# Patient Record
Sex: MALE | Race: WHITE | Employment: OTHER | URBAN - METROPOLITAN AREA
[De-identification: names, ages, dates, MRNs, and addresses within clinical notes are randomized per-mention and may not be internally consistent; named-entity substitution may affect disease eponyms.]

---

## 2022-09-21 ENCOUNTER — OFFICE VISIT (OUTPATIENT)
Dept: SURGERY | Age: 77
End: 2022-09-21
Payer: MEDICARE

## 2022-09-21 ENCOUNTER — PREP FOR PROCEDURE (OUTPATIENT)
Dept: SURGERY | Age: 77
End: 2022-09-21

## 2022-09-21 VITALS
HEART RATE: 81 BPM | OXYGEN SATURATION: 98 % | WEIGHT: 172.8 LBS | DIASTOLIC BLOOD PRESSURE: 80 MMHG | SYSTOLIC BLOOD PRESSURE: 130 MMHG

## 2022-09-21 DIAGNOSIS — C43.71 MALIGNANT MELANOMA OF RIGHT LOWER EXTREMITY INCLUDING HIP (HCC): Primary | ICD-10-CM

## 2022-09-21 DIAGNOSIS — C43.71 MELANOMA OF RIGHT POSTERIOR CALF (HCC): ICD-10-CM

## 2022-09-21 PROCEDURE — 1123F ACP DISCUSS/DSCN MKR DOCD: CPT | Performed by: SURGERY

## 2022-09-21 PROCEDURE — 99204 OFFICE O/P NEW MOD 45 MIN: CPT | Performed by: SURGERY

## 2022-09-21 NOTE — PROGRESS NOTES
nares normal  CV: RRR. Normal perfusion  Resp: No JVD. Breathing is  non-labored; no audible wheezing. GI: soft and non-distended       Healing shave biopsy site right posterior calf  No satellite nodules  No regional adenopathy. Musculoskeletal: unremarkable with normal function. No embolic signs or cyanosis. Neuro:  Oriented; moves all 4; no focal deficits  Psychiatric: normal affect and mood, no memory impairment    Recent vitals (if inpt):  @IPVITALS(24:)@    Amount and/or Complexity of Data Reviewed and Analyzed:  I reviewed and analyzed all of the unique labs and radiologic studies that are shown below as well as any that are in the HPI, and any that are in the expanded problem list below  *Each unique test, order, or document contributes to the combination of 2 or combination of 3 in Category 1 below. For this visit I also reviewed old records and prior notes. No results for input(s): WBC, HGB, PLT, NA, K, CL, CO2, BUN, CREA, GLU, INR, APTT, ALT, AML, AML, LCAD, PCO2, PO2, HCO3 in the last 72 hours. Invalid input(s): PTP, TBIL, TBILI, CBIL, SGOT, GPT, AP, LPSE, NH4, TROPT, TROIQ,  PH  Review of most recent CBC  No results found for: WBC, HGB, HCT, MCV, PLT    Review of most recent BMP  No results found for: NA, K, CL, CO2, BUN, CREATININE, GLUCOSE, CALCIUM     Review of most recent LFTs (and lipase if done)  No results found for: ALT, AST, GGT, ALKPHOS, BILITOT  No results found for: LIPASE    No results found for: INR, APTT, LCAD    Review of most recent HgbA1c  No results found for: LABA1C  No results found for: EAG    Nutritional assessment screen for wound healing issues:  No results found for: LABPROT, LABALBU    @lastcovr@    Xray Result (most recent):  No results found for this or any previous visit from the past 3650 days. CT Result (most recent):  No results found for this or any previous visit from the past 3650 days.     US Result (most recent):  No results found for this or any previous visit from the past 3650 days. Admission date (for inpatients): (Not on file)   * No surgery found *  * No surgery found *        ASSESSMENT/PLAN:  [unfilled]  Active Problems:    * No active hospital problems. *  Resolved Problems:    * No resolved hospital problems. *     Patient Active Problem List    Diagnosis Date Noted    Melanoma of right posterior calf ([facility] [address] 09/21/2022     Priority: Medium            Number and Complexity of Problems addressed and   Risks of complications and/or morbidity of management      pT3bN0 right anterior calf malignant melanoma  Informed consent was obtained for wide excision with split-thickness skin grafting and sentinel node excision  Procedure risk were discussed including bleeding, infection, recurrence, blood clots, risk of anesthesia, etc.. All his questions were answered. He is in favor proceeding. We will schedule this for him soon. I discussed the seriousness of his condition with he and his wife on 9/21/2022. We will obtain a preoperative chest x-ray and LFTs as a baseline. He will need to be off blood thinners for surgery. I have asked him to review his prescription bottle at home and call the physician's office who is prescribing the medication and ask her to be off the blood thinner prior to surgery for as many days as the prescriber will allow. We are still trying to is called just who prescribed the below. He does not name group practice or the name of the prescription at time of office visit. I tried showing him photographs on the Internet of the different cardiologist at the groups in [facility] but none of them could be identified by the patient as his cardiologist.  He will check the prescription bottle at home and call us back.                Level of MDM (2/3 elements below)  Number and Complexity of Problems Addressed Amount and/or Complexity of Data to be Reviewed and Analyzed  *Each unique test, order, or document contributes to the combination of 2 or combination of 3 in Category 1 below. Risk of Complications and/or Morbidity or Mortality of pt Management     39051  40044 SF Minimal  ?1self-limited or minor problem Minimal or none Minimal risk of morbidity from additional diagnostic testing or Rx   92089  75700 Low Low  ? 2or more self-limited or minor problems;    or  ? 1stable chronic illness;    or  ?1acute, uncomplicated illness or injury   Limited  (Must meet the requirements of at least 1 of the 2 categories)  Category 1: Tests and documents   ? Any combination of 2 from the following:  ?Review of prior external note(s) from each unique source*;  ?review of the result(s) of each unique test*;   ?ordering of each unique test*    or   Category 2: Assessment requiring an independent historian(s)  (For the categories of independent interpretation of tests and discussion of management or test interpretation, see moderate or high) Low risk of morbidity from additional diagnostic testing or treatment     88561  64176 Mod Moderate  ? 1or more chronic illnesses with exacerbation, progression, or side effects of treatment;    or  ?2or more stable chronic illnesses;    or  ?1undiagnosed new problem with uncertain prognosis;    or  ?1acute illness with systemic symptoms;    or  ?1acute complicated injury   Moderate  (Must meet the requirements of at least 1 out of 3 categories)  Category 1: Tests, documents, or independent historian(s)  ? Any combination of 3 from the following:   ?Review of prior external note(s) from each unique source*;  ?Review of the result(s) of each unique test*;  ?Ordering of each unique test*;  ?Assessment requiring an independent historian(s)    or  Category 2: Independent interpretation of tests   ? Independent interpretation of a test performed by another physician/other qualified health care professional (not separately reported);     or  Category 3: Discussion of management or test interpretation  ? Discussion of management or test interpretation with external physician/other qualified health care professional/appropriate source (not separately reported)   Moderate risk of morbidity from additional diagnostic testing or treatment  Examples only:  ?Prescription drug management   ? Decision regarding minor surgery with identified patient or procedure risk factors  ? Decision regarding elective major surgery without identified patient or procedure risk factors   ? Diagnosis or treatment significantly limited by social determinants of health       [id number] High High  ? 1or more chronic illnesses with severe exacerbation, progression, or side effects of treatment;    or  ?1 acute or chronic illness or injury that poses a threat to life or bodily function   Extensive  (Must meet the requirements of at least 2 out of 3 categories)  Category 1: Tests, documents, or independent historian(s)  ? Any combination of 3 from the following:   ?Review of prior external note(s) from each unique source*;  ?Review of the result(s) of each unique test*;   ?Ordering of each unique test*;   ?Assessment requiring an independent historian(s)    or   Category 2: Independent interpretation of tests   ? Independent interpretation of a test performed by another physician/other qualified health care professional (not separately reported);     or  Category 3: Discussion of management or test interpretation  ? Discussion of management or test interpretation with external physician/other qualified health care professional/appropriate source (not separately reported)   High risk of morbidity from additional diagnostic testing or treatment  Examples only:  ?Drug therapy requiring intensive monitoring for toxicity  ? Decision regarding elective major surgery with identified patient or procedure risk factors  ? Decision regarding emergency major surgery  ? Decision regarding hospitalization  ? Decision not to resuscitate or to de-escalate care because of poor prognosis I have personally performed a face-to-face diagnostic evaluation and management  service on this patient. I have independently seen the patient. I have independently obtained the above history from the patient/family. I have independently examined the patient with above findings. I have independently reviewed data/labs for this patient and developed the above plan of care (MDM).       Signed: Octavia Doe MD  9/21/2022    10:46 AM

## 2022-10-11 ENCOUNTER — HOSPITAL ENCOUNTER (OUTPATIENT)
Dept: PREADMISSION TESTING | Age: 77
Discharge: HOME | End: 2022-10-14
Payer: MEDICARE

## 2022-10-11 VITALS
TEMPERATURE: 98.2 F | HEART RATE: 72 BPM | BODY MASS INDEX: 26.19 KG/M2 | OXYGEN SATURATION: 100 % | HEIGHT: 69 IN | DIASTOLIC BLOOD PRESSURE: 76 MMHG | WEIGHT: 176.8 LBS | RESPIRATION RATE: 16 BRPM | SYSTOLIC BLOOD PRESSURE: 134 MMHG

## 2022-10-11 LAB
CREAT SERPL-MCNC: 0.76 MG/DL (ref 0.8–1.5)
HGB BLD-MCNC: 13.5 G/DL (ref 13.6–17.2)
POTASSIUM SERPL-SCNC: 3.8 MMOL/L (ref 3.5–5.1)

## 2022-10-11 PROCEDURE — 82565 ASSAY OF CREATININE: CPT

## 2022-10-11 PROCEDURE — 85018 HEMOGLOBIN: CPT

## 2022-10-11 PROCEDURE — 84132 ASSAY OF SERUM POTASSIUM: CPT

## 2022-10-11 RX ORDER — ASPIRIN 81 MG/1
81 TABLET ORAL DAILY
COMMUNITY
Start: 2022-10-17 | End: 2022-10-18

## 2022-10-11 RX ORDER — LOSARTAN POTASSIUM AND HYDROCHLOROTHIAZIDE 25; 100 MG/1; MG/1
1 TABLET ORAL DAILY
COMMUNITY

## 2022-10-11 RX ORDER — CETIRIZINE HYDROCHLORIDE 10 MG/1
10 TABLET ORAL DAILY
COMMUNITY

## 2022-10-11 RX ORDER — ATORVASTATIN CALCIUM 20 MG/1
20 TABLET, FILM COATED ORAL EVERY MORNING
COMMUNITY

## 2022-10-11 NOTE — PERIOP NOTE
PLEASE CONTINUE TAKING ALL PRESCRIPTION MEDICATIONS UP TO THE DAY OF SURGERY UNLESS OTHERWISE DIRECTED BELOW. Take ONLY the following medications on the day of surgery:   Aspirin 81 mg  Atorvastatin   Metoprolol             Hold the following medications as specified:   Eliquis- hold 48 hours (2 days prior to surgery)   DISCONTINUE all vitamins and supplements 7 days prior to surgery. DISCONTINUE Non-Steriodal Anti-Inflammatory (NSAIDS) such as Advil and Aleve 5 days prior to surgery. Comments       On the day before surgery please take Tylenol (Acetaminophen) 1000mg in the morning and then again before bed . Please do not bring home medications with you on the day of surgery unless otherwise directed by your nurse. If you are instructed to bring home medications, please give them to your nurse as they will be administered by the nursing staff. If you have any questions, please call [facility] [phone] or [facility] [phone]. A copy of this note was provided to the patient for reference.

## 2022-10-11 NOTE — PERIOP NOTE
Patient confirms name and . Order to obtain consent NOT found in EHR, however patient verifies case posting and matches surgeon's note 22. .    Type 2 surgery,  assessment complete. Labs per surgeon: unknown  Labs per anesthesia protocol: hgb, potassium, creatinine  EK22 viewable tracing in [facility]. Cardiology notes also available in EHR. Medication hold clearance available in [facility]. Glucose:not indicated    Medication list updated today. Medication bottles visualized. Hibiclens/Dynahex antiseptic wash and instructions given per hospital policy. Patient provided with and instructed on educational handouts including Guide to Surgery, Pain Management, Hand Hygiene, Blood Transfusion Education, and [facility] [facility] Brochure. Patient answered medical/surgical history questions at their best of ability. All prior to admission medications documented in [facility]. Patient instructed on the following:  Arrive at MAIN Entrance, time of arrival to be called the day before by 1700  NPO after midnight including gum, mints, and ice chips. Responsible adult must drive patient to the hospital, stay during surgery, and patient will require supervision 24 hours after anesthesia. Use hibiclens in shower the night before surgery and on the morning of surgery. Leave all valuables (money and jewelry) at home but bring insurance card and ID on DOS. Do not wear make-up, nail polish, lotions, cologne, perfumes, powders, or oil on skin. You may be required to pay a deductible or co-pay on the day of your procedure. You can pre-pay by calling [phone] if your surgery is at the [facility] or [phone] if your surgery is at the [facility]. You will received a call from the pre-op nurse by 5 pm on the business day prior to the scheduled procedure. If you have not spoken with a nurse, please check your voicemail.  If you have not received an arrival time by 5 pm, please call [phone]. Patient teach back successful and patient demonstrates knowledge of instruction.

## 2022-10-17 ENCOUNTER — ANESTHESIA EVENT (OUTPATIENT)
Dept: SURGERY | Age: 77
End: 2022-10-17
Payer: MEDICARE

## 2022-10-17 ENCOUNTER — PREP FOR PROCEDURE (OUTPATIENT)
Dept: SURGERY | Age: 77
End: 2022-10-17

## 2022-10-17 RX ORDER — SODIUM CHLORIDE 0.9 % (FLUSH) 0.9 %
5-40 SYRINGE (ML) INJECTION EVERY 12 HOURS SCHEDULED
Status: CANCELLED | OUTPATIENT
Start: 2022-10-17

## 2022-10-17 RX ORDER — SODIUM CHLORIDE 0.9 % (FLUSH) 0.9 %
5-40 SYRINGE (ML) INJECTION PRN
Status: CANCELLED | OUTPATIENT
Start: 2022-10-17

## 2022-10-17 RX ORDER — SODIUM CHLORIDE 9 MG/ML
INJECTION, SOLUTION INTRAVENOUS PRN
Status: CANCELLED | OUTPATIENT
Start: 2022-10-17

## 2022-10-17 NOTE — H&P
H&P/Consult Note/Progress Note/Office Note:   Jose Pollock  MRN: [id number]  DMI:[birth year]  Age:77 y.o.    HPI: Jose Pollock is a 65 y.o. male who is here for [facility] with STSG of a cT3bN0 right posterior calf melanoma and right inguinofemoral sent node excision on 10/18/22        Prior to surgery he was referred by Dr. Debra Castelan for evaluation of cT3bN0 malignant melanoma of the right calf. He had a shave biopsy on 8/24/22 by Dr. Cora Samuel of the right distal calf which identified a malignant melanoma   Breslow 3.4 mm; IV; +ulceration, +regression; 20 mitosis/ sq mm; no microsatellitosis; no LVI    Lesion developed over several months and was getting larger in size. Nothing in particular made it better or worse. No associated weight loss. He takes a blood thinner but does not know the name. Past Medical History:   Diagnosis Date    History of cardioversion 09/01/2022    converted then a fib returned 2 weeks    Hyperlipidemia     Hypertension     New onset a-fib ([facility] [address] 08/17/2022    cardioversion @ [facility] 9/1/22-Afib returned 2 weeks later- Eliquis & Metoprolol- [facility]     Past Surgical History:   Procedure Laterality Date    CARDIOVERSION  09/01/2022    & [facility]    CATARACT EXTRACTION      PENILE PROSTHESIS       No current facility-administered medications for this encounter.      Current Outpatient Medications   Medication Sig    losartan-hydroCHLOROthiazide (HYZAAR) 100-25 MG per tablet Take 1 tablet by mouth daily    apixaban (ELIQUIS) 5 MG TABS tablet Take 5 mg by mouth 2 times daily Hold 48 hours prior to surgery per medication hold clearance / [facility]    atorvastatin (LIPITOR) 20 MG tablet Take 20 mg by mouth every morning    cetirizine (ZYRTEC) 10 MG tablet Take 10 mg by mouth daily    metoprolol tartrate (LOPRESSOR) 25 MG tablet Take 25 mg by mouth 2 times daily    Multiple Vitamin (MULTIVITAMINS PO) Take 1 tablet by mouth daily    aspirin 81 MG EC tablet Take 81 mg by mouth daily     ALLERGIES:  Lisinopril    Social History     Socioeconomic History    Marital status:    Tobacco Use    Smoking status: Never    Smokeless tobacco: Never   Vaping Use    Vaping Use: Never used   Substance and Sexual Activity    Alcohol use: Not Currently    Drug use: Not Currently     Social History     Tobacco Use   Smoking Status Never   Smokeless Tobacco Never     Family History   Problem Relation Age of Onset    Cancer Mother      ROS: The patient has no difficulty with chest pain or shortness of breath. No fever or chills. Comprehensive review of systems was otherwise unremarkable except as noted above. Physical Exam:   There were no vitals taken for this visit. There were no vitals filed for this visit. [unfilled]  [unfilled]    Constitutional: Alert, oriented, cooperative patient in no acute distress; appears stated age    Eyes:Sclera are clear. EOMs intact  ENMT: no external lesions gross hearing normal; no obvious neck masses, no ear or lip lesions, nares normal  CV: RRR. Normal perfusion  Resp: No JVD. Breathing is  non-labored; no audible wheezing. GI: soft and non-distended       Healing shave biopsy site right posterior calf  No satellite nodules  No regional adenopathy. Musculoskeletal: unremarkable with normal function. No embolic signs or cyanosis. Neuro:  Oriented; moves all 4; no focal deficits  Psychiatric: normal affect and mood, no memory impairment    Recent vitals (if inpt):  @IPVITALS(24:)@    Amount and/or Complexity of Data Reviewed and Analyzed:  I reviewed and analyzed all of the unique labs and radiologic studies that are shown below as well as any that are in the HPI, and any that are in the expanded problem list below  *Each unique test, order, or document contributes to the combination of 2 or combination of 3 in Category 1 below. For this visit I also reviewed old records and prior notes.       No results for input(s): WBC, HGB, PLT, NA, K, CL, CO2, BUN, CREA, GLU, INR, APTT, ALT, AML, AML, LCAD, PCO2, PO2, HCO3 in the last 72 hours. Invalid input(s): PTP, TBIL, TBILI, CBIL, SGOT, GPT, AP, LPSE, NH4, TROPT, TROIQ,  PH  Review of most recent CBC  Lab Results   Component Value Date    HGB 13.5 (L) 10/11/2022       Review of most recent BMP  Lab Results   Component Value Date/Time    K 3.8 10/11/2022 02:24 PM    CREATININE 0.76 10/11/2022 02:24 PM        Review of most recent LFTs (and lipase if done)  No results found for: ALT, AST, GGT, ALKPHOS, BILITOT  No results found for: LIPASE    No results found for: INR, APTT, LCAD    Review of most recent HgbA1c  No results found for: LABA1C  No results found for: EAG    Nutritional assessment screen for wound healing issues:  No results found for: LABPROT, LABALBU    @lastcovr@    Xray Result (most recent):  No results found for this or any previous visit from the past 3650 days. CT Result (most recent):  No results found for this or any previous visit from the past 3650 days. US Result (most recent):  No results found for this or any previous visit from the past 3650 days. Admission date (for inpatients): (Not on file)   * No surgery date entered *  * No surgery found *        ASSESSMENT/PLAN:  [unfilled]  Principal Problem:    Melanoma of right posterior calf (HCC)  Resolved Problems:    * No resolved hospital problems.  *     Patient Active Problem List    Diagnosis Date Noted    Melanoma of right posterior calf ([facility] [address] 09/21/2022     Priority: Medium            Number and Complexity of Problems addressed and   Risks of complications and/or morbidity of management      pT3bN0 right posterior calf malignant melanoma  He is here for [facility] with STSG right anterior calf melanoma and right inguinofemoral sent node excision on 10/18/22    Informed consent was obtained for wide excision with split-thickness skin grafting and sentinel node excision  Procedure risk were discussed including bleeding, infection, recurrence, blood clots, risk of anesthesia, etc.. All his questions were answered. He is in favor proceeding. I discussed the seriousness of his condition with he and his wife on 9/21/22. Dr Jae Saleh ordered preoperative CXR and LFTs as a baseline. He has been off Eliquis (for afib) 2 days leading up to surgery which was approved by Dr Elizabeth García who prescribes the Eliquis                   Level of MDM (2/3 elements below)  Number and Complexity of Problems Addressed Amount and/or Complexity of Data to be Reviewed and Analyzed  *Each unique test, order, or document contributes to the combination of 2 or combination of 3 in Category 1 below. Risk of Complications and/or Morbidity or Mortality of pt Management     19991  87460 SF Minimal  ?1self-limited or minor problem Minimal or none Minimal risk of morbidity from additional diagnostic testing or Rx   61770  07542 Low Low  ? 2or more self-limited or minor problems;    or  ? 1stable chronic illness;    or  ?1acute, uncomplicated illness or injury   Limited  (Must meet the requirements of at least 1 of the 2 categories)  Category 1: Tests and documents   ? Any combination of 2 from the following:  ?Review of prior external note(s) from each unique source*;  ?review of the result(s) of each unique test*;   ?ordering of each unique test*    or   Category 2: Assessment requiring an independent historian(s)  (For the categories of independent interpretation of tests and discussion of management or test interpretation, see moderate or high) Low risk of morbidity from additional diagnostic testing or treatment     86599  81382 Mod Moderate  ? 1or more chronic illnesses with exacerbation, progression, or side effects of treatment;    or  ?2or more stable chronic illnesses;    or  ?1undiagnosed new problem with uncertain prognosis;    or  ?1acute illness with systemic symptoms;    or  ?1acute complicated injury   Moderate  (Must meet the requirements of at least 1 out of 3 categories)  Category 1: Tests, documents, or independent historian(s)  ? Any combination of 3 from the following:   ?Review of prior external note(s) from each unique source*;  ?Review of the result(s) of each unique test*;  ?Ordering of each unique test*;  ?Assessment requiring an independent historian(s)    or  Category 2: Independent interpretation of tests   ? Independent interpretation of a test performed by another physician/other qualified health care professional (not separately reported);     or  Category 3: Discussion of management or test interpretation  ? Discussion of management or test interpretation with external physician/other qualified health care professional/appropriate source (not separately reported)   Moderate risk of morbidity from additional diagnostic testing or treatment  Examples only:  ?Prescription drug management   ? Decision regarding minor surgery with identified patient or procedure risk factors  ? Decision regarding elective major surgery without identified patient or procedure risk factors   ? Diagnosis or treatment significantly limited by social determinants of health       39895  22479 High High  ? 1or more chronic illnesses with severe exacerbation, progression, or side effects of treatment;    or  ?1 acute or chronic illness or injury that poses a threat to life or bodily function   Extensive  (Must meet the requirements of at least 2 out of 3 categories)  Category 1: Tests, documents, or independent historian(s)  ? Any combination of 3 from the following:   ?Review of prior external note(s) from each unique source*;  ?Review of the result(s) of each unique test*;   ?Ordering of each unique test*;   ?Assessment requiring an independent historian(s)    or   Category 2: Independent interpretation of tests   ? Independent interpretation of a test performed by another physician/other qualified health care professional (not separately reported);     or  Category 3: Discussion of management or test interpretation  ? Discussion of management or test interpretation with external physician/other qualified health care professional/appropriate source (not separately reported)   High risk of morbidity from additional diagnostic testing or treatment  Examples only:  ?Drug therapy requiring intensive monitoring for toxicity  ? Decision regarding elective major surgery with identified patient or procedure risk factors  ? Decision regarding emergency major surgery  ? Decision regarding hospitalization  ? Decision not to resuscitate or to de-escalate care because of poor prognosis             I have personally performed a face-to-face diagnostic evaluation and management  service on this patient. I have independently seen the patient. I have independently obtained the above history from the patient/family. I have independently examined the patient with above findings. I have independently reviewed data/labs for this patient and developed the above plan of care (MDM).       Signed: Nam Pond MD  10/17/2022    4:25 PM

## 2022-10-17 NOTE — PERIOP NOTE
Directly informed patient and or family member of pre op arrival time 12 on 10/18. All questions answered. Pre op instructions reviewed. Left contact information for any additional questions or needs.

## 2022-10-18 ENCOUNTER — APPOINTMENT (OUTPATIENT)
Dept: NUCLEAR MEDICINE | Age: 77
End: 2022-10-18
Payer: MEDICARE

## 2022-10-18 ENCOUNTER — HOSPITAL ENCOUNTER (OUTPATIENT)
Age: 77
Setting detail: OUTPATIENT SURGERY
Discharge: HOME | End: 2022-10-18
Attending: SURGERY | Admitting: SURGERY
Payer: MEDICARE

## 2022-10-18 ENCOUNTER — ANESTHESIA (OUTPATIENT)
Dept: SURGERY | Age: 77
End: 2022-10-18
Payer: MEDICARE

## 2022-10-18 VITALS
HEART RATE: 81 BPM | BODY MASS INDEX: 24.16 KG/M2 | HEIGHT: 71 IN | TEMPERATURE: 97.9 F | SYSTOLIC BLOOD PRESSURE: 123 MMHG | WEIGHT: 172.6 LBS | OXYGEN SATURATION: 98 % | RESPIRATION RATE: 16 BRPM | DIASTOLIC BLOOD PRESSURE: 79 MMHG

## 2022-10-18 DIAGNOSIS — C43.71 MELANOMA OF RIGHT POSTERIOR CALF (HCC): ICD-10-CM

## 2022-10-18 DIAGNOSIS — C43.71 MALIGNANT MELANOMA OF RIGHT LOWER EXTREMITY INCLUDING HIP (HCC): ICD-10-CM

## 2022-10-18 PROBLEM — S71.101A OPEN WOUND OF RIGHT THIGH: Status: ACTIVE | Noted: 2022-10-18

## 2022-10-18 LAB — POTASSIUM BLD-SCNC: 3.6 MMOL/L (ref 3.5–5.1)

## 2022-10-18 PROCEDURE — 3600000013 HC SURGERY LEVEL 3 ADDTL 15MIN: Performed by: SURGERY

## 2022-10-18 PROCEDURE — 2580000003 HC RX 258: Performed by: ANESTHESIOLOGY

## 2022-10-18 PROCEDURE — 6360000002 HC RX W HCPCS: Performed by: NURSE ANESTHETIST, CERTIFIED REGISTERED

## 2022-10-18 PROCEDURE — 38525 BIOPSY/REMOVAL LYMPH NODES: CPT | Performed by: SURGERY

## 2022-10-18 PROCEDURE — 3430000000 HC RX DIAGNOSTIC RADIOPHARMACEUTICAL: Performed by: SURGERY

## 2022-10-18 PROCEDURE — 2500000003 HC RX 250 WO HCPCS: Performed by: SURGERY

## 2022-10-18 PROCEDURE — 27616 RESECT LEG/ANKLE TUM 5 CM/>: CPT | Performed by: SURGERY

## 2022-10-18 PROCEDURE — 38900 IO MAP OF SENT LYMPH NODE: CPT | Performed by: SURGERY

## 2022-10-18 PROCEDURE — 2709999900 HC NON-CHARGEABLE SUPPLY: Performed by: SURGERY

## 2022-10-18 PROCEDURE — 7100000000 HC PACU RECOVERY - FIRST 15 MIN: Performed by: SURGERY

## 2022-10-18 PROCEDURE — 3600000003 HC SURGERY LEVEL 3 BASE: Performed by: SURGERY

## 2022-10-18 PROCEDURE — 6360000002 HC RX W HCPCS: Performed by: SURGERY

## 2022-10-18 PROCEDURE — 99024 POSTOP FOLLOW-UP VISIT: CPT | Performed by: SURGERY

## 2022-10-18 PROCEDURE — 7100000001 HC PACU RECOVERY - ADDTL 15 MIN: Performed by: SURGERY

## 2022-10-18 PROCEDURE — 88305 TISSUE EXAM BY PATHOLOGIST: CPT

## 2022-10-18 PROCEDURE — 88307 TISSUE EXAM BY PATHOLOGIST: CPT

## 2022-10-18 PROCEDURE — 14301 TIS TRNFR ANY 30.1-60 SQ CM: CPT | Performed by: SURGERY

## 2022-10-18 PROCEDURE — 78195 LYMPH SYSTEM IMAGING: CPT

## 2022-10-18 PROCEDURE — 3700000000 HC ANESTHESIA ATTENDED CARE: Performed by: SURGERY

## 2022-10-18 PROCEDURE — 7100000011 HC PHASE II RECOVERY - ADDTL 15 MIN: Performed by: SURGERY

## 2022-10-18 PROCEDURE — 2500000003 HC RX 250 WO HCPCS: Performed by: NURSE ANESTHETIST, CERTIFIED REGISTERED

## 2022-10-18 PROCEDURE — 6370000000 HC RX 637 (ALT 250 FOR IP): Performed by: ANESTHESIOLOGY

## 2022-10-18 PROCEDURE — A9541 TC99M SULFUR COLLOID: HCPCS | Performed by: SURGERY

## 2022-10-18 PROCEDURE — 7100000010 HC PHASE II RECOVERY - FIRST 15 MIN: Performed by: SURGERY

## 2022-10-18 PROCEDURE — 3700000001 HC ADD 15 MINUTES (ANESTHESIA): Performed by: SURGERY

## 2022-10-18 PROCEDURE — 84132 ASSAY OF SERUM POTASSIUM: CPT

## 2022-10-18 PROCEDURE — 15100 SPLT AGRFT T/A/L 1ST 100SQCM: CPT | Performed by: SURGERY

## 2022-10-18 RX ORDER — HYDROMORPHONE HYDROCHLORIDE 2 MG/ML
0.25 INJECTION, SOLUTION INTRAMUSCULAR; INTRAVENOUS; SUBCUTANEOUS EVERY 5 MIN PRN
Status: DISCONTINUED | OUTPATIENT
Start: 2022-10-18 | End: 2022-10-18 | Stop reason: HOSPADM

## 2022-10-18 RX ORDER — OXYCODONE HYDROCHLORIDE 5 MG/1
5 TABLET ORAL PRN
Status: COMPLETED | OUTPATIENT
Start: 2022-10-18 | End: 2022-10-18

## 2022-10-18 RX ORDER — EPHEDRINE SULFATE/0.9% NACL/PF 50 MG/5 ML
SYRINGE (ML) INTRAVENOUS PRN
Status: DISCONTINUED | OUTPATIENT
Start: 2022-10-18 | End: 2022-10-18 | Stop reason: SDUPTHER

## 2022-10-18 RX ORDER — SODIUM CHLORIDE 0.9 % (FLUSH) 0.9 %
5-40 SYRINGE (ML) INJECTION PRN
Status: DISCONTINUED | OUTPATIENT
Start: 2022-10-18 | End: 2022-10-18 | Stop reason: HOSPADM

## 2022-10-18 RX ORDER — PHENYLEPHRINE HYDROCHLORIDE 10 MG/ML
INJECTION INTRAVENOUS PRN
Status: DISCONTINUED | OUTPATIENT
Start: 2022-10-18 | End: 2022-10-18 | Stop reason: SDUPTHER

## 2022-10-18 RX ORDER — ONDANSETRON 2 MG/ML
4 INJECTION INTRAMUSCULAR; INTRAVENOUS
Status: DISCONTINUED | OUTPATIENT
Start: 2022-10-18 | End: 2022-10-18 | Stop reason: HOSPADM

## 2022-10-18 RX ORDER — SODIUM CHLORIDE, SODIUM LACTATE, POTASSIUM CHLORIDE, CALCIUM CHLORIDE 600; 310; 30; 20 MG/100ML; MG/100ML; MG/100ML; MG/100ML
INJECTION, SOLUTION INTRAVENOUS CONTINUOUS
Status: DISCONTINUED | OUTPATIENT
Start: 2022-10-18 | End: 2022-10-18 | Stop reason: HOSPADM

## 2022-10-18 RX ORDER — PROPOFOL 10 MG/ML
INJECTION, EMULSION INTRAVENOUS PRN
Status: DISCONTINUED | OUTPATIENT
Start: 2022-10-18 | End: 2022-10-18 | Stop reason: SDUPTHER

## 2022-10-18 RX ORDER — ACETAMINOPHEN 500 MG
1000 TABLET ORAL ONCE
Status: COMPLETED | OUTPATIENT
Start: 2022-10-18 | End: 2022-10-18

## 2022-10-18 RX ORDER — OXYCODONE HYDROCHLORIDE 5 MG/1
10 TABLET ORAL PRN
Status: COMPLETED | OUTPATIENT
Start: 2022-10-18 | End: 2022-10-18

## 2022-10-18 RX ORDER — FENTANYL CITRATE 50 UG/ML
INJECTION, SOLUTION INTRAMUSCULAR; INTRAVENOUS PRN
Status: DISCONTINUED | OUTPATIENT
Start: 2022-10-18 | End: 2022-10-18 | Stop reason: SDUPTHER

## 2022-10-18 RX ORDER — MIDAZOLAM HYDROCHLORIDE 2 MG/2ML
2 INJECTION, SOLUTION INTRAMUSCULAR; INTRAVENOUS
Status: DISCONTINUED | OUTPATIENT
Start: 2022-10-18 | End: 2022-10-18 | Stop reason: HOSPADM

## 2022-10-18 RX ORDER — SODIUM CHLORIDE 0.9 % (FLUSH) 0.9 %
5-40 SYRINGE (ML) INJECTION PRN
Status: DISCONTINUED | OUTPATIENT
Start: 2022-10-18 | End: 2022-10-18 | Stop reason: SDUPTHER

## 2022-10-18 RX ORDER — LIDOCAINE HYDROCHLORIDE 10 MG/ML
1 INJECTION, SOLUTION INFILTRATION; PERINEURAL
Status: DISCONTINUED | OUTPATIENT
Start: 2022-10-18 | End: 2022-10-18 | Stop reason: HOSPADM

## 2022-10-18 RX ORDER — LIDOCAINE HYDROCHLORIDE 10 MG/ML
INJECTION, SOLUTION INFILTRATION; PERINEURAL PRN
Status: DISCONTINUED | OUTPATIENT
Start: 2022-10-18 | End: 2022-10-18 | Stop reason: HOSPADM

## 2022-10-18 RX ORDER — SODIUM CHLORIDE 0.9 % (FLUSH) 0.9 %
5-40 SYRINGE (ML) INJECTION EVERY 12 HOURS SCHEDULED
Status: DISCONTINUED | OUTPATIENT
Start: 2022-10-18 | End: 2022-10-18 | Stop reason: SDUPTHER

## 2022-10-18 RX ORDER — ONDANSETRON 2 MG/ML
INJECTION INTRAMUSCULAR; INTRAVENOUS PRN
Status: DISCONTINUED | OUTPATIENT
Start: 2022-10-18 | End: 2022-10-18 | Stop reason: SDUPTHER

## 2022-10-18 RX ORDER — SODIUM CHLORIDE 0.9 % (FLUSH) 0.9 %
5-40 SYRINGE (ML) INJECTION EVERY 12 HOURS SCHEDULED
Status: DISCONTINUED | OUTPATIENT
Start: 2022-10-18 | End: 2022-10-18 | Stop reason: HOSPADM

## 2022-10-18 RX ORDER — LIDOCAINE HYDROCHLORIDE 20 MG/ML
INJECTION, SOLUTION EPIDURAL; INFILTRATION; INTRACAUDAL; PERINEURAL PRN
Status: DISCONTINUED | OUTPATIENT
Start: 2022-10-18 | End: 2022-10-18 | Stop reason: SDUPTHER

## 2022-10-18 RX ORDER — SODIUM CHLORIDE 9 MG/ML
INJECTION, SOLUTION INTRAVENOUS PRN
Status: DISCONTINUED | OUTPATIENT
Start: 2022-10-18 | End: 2022-10-18 | Stop reason: SDUPTHER

## 2022-10-18 RX ORDER — HYDROMORPHONE HYDROCHLORIDE 2 MG/ML
0.5 INJECTION, SOLUTION INTRAMUSCULAR; INTRAVENOUS; SUBCUTANEOUS EVERY 5 MIN PRN
Status: DISCONTINUED | OUTPATIENT
Start: 2022-10-18 | End: 2022-10-18 | Stop reason: HOSPADM

## 2022-10-18 RX ORDER — PROCHLORPERAZINE EDISYLATE 5 MG/ML
5 INJECTION INTRAMUSCULAR; INTRAVENOUS
Status: DISCONTINUED | OUTPATIENT
Start: 2022-10-18 | End: 2022-10-18 | Stop reason: HOSPADM

## 2022-10-18 RX ORDER — ACETAMINOPHEN 500 MG
500 TABLET ORAL EVERY 6 HOURS PRN
Status: ON HOLD | COMMUNITY
End: 2022-10-18 | Stop reason: HOSPADM

## 2022-10-18 RX ORDER — HYDROCODONE BITARTRATE AND ACETAMINOPHEN 5; 325 MG/1; MG/1
1-2 TABLET ORAL EVERY 8 HOURS PRN
Qty: 28 TABLET | Refills: 0 | Status: SHIPPED | OUTPATIENT
Start: 2022-10-18 | End: 2022-10-25

## 2022-10-18 RX ORDER — DIPHENHYDRAMINE HYDROCHLORIDE 50 MG/ML
12.5 INJECTION INTRAMUSCULAR; INTRAVENOUS
Status: DISCONTINUED | OUTPATIENT
Start: 2022-10-18 | End: 2022-10-18 | Stop reason: HOSPADM

## 2022-10-18 RX ORDER — SODIUM CHLORIDE 9 MG/ML
INJECTION, SOLUTION INTRAVENOUS PRN
Status: DISCONTINUED | OUTPATIENT
Start: 2022-10-18 | End: 2022-10-18 | Stop reason: HOSPADM

## 2022-10-18 RX ADMIN — FENTANYL CITRATE 25 MCG: 50 INJECTION, SOLUTION INTRAMUSCULAR; INTRAVENOUS at 12:07

## 2022-10-18 RX ADMIN — OXYCODONE 5 MG: 5 TABLET ORAL at 13:19

## 2022-10-18 RX ADMIN — PHENYLEPHRINE HYDROCHLORIDE 100 MCG: 10 INJECTION INTRAVENOUS at 11:01

## 2022-10-18 RX ADMIN — PROPOFOL 20 MG: 10 INJECTION, EMULSION INTRAVENOUS at 12:12

## 2022-10-18 RX ADMIN — PROPOFOL 180 MG: 10 INJECTION, EMULSION INTRAVENOUS at 10:58

## 2022-10-18 RX ADMIN — Medication 10 MG: at 11:01

## 2022-10-18 RX ADMIN — Medication 10 MG: at 11:15

## 2022-10-18 RX ADMIN — PHENYLEPHRINE HYDROCHLORIDE 100 MCG: 10 INJECTION INTRAVENOUS at 11:22

## 2022-10-18 RX ADMIN — Medication 10 MG: at 11:31

## 2022-10-18 RX ADMIN — SODIUM CHLORIDE, POTASSIUM CHLORIDE, SODIUM LACTATE AND CALCIUM CHLORIDE: 600; 310; 30; 20 INJECTION, SOLUTION INTRAVENOUS at 07:26

## 2022-10-18 RX ADMIN — ONDANSETRON 4 MG: 2 INJECTION INTRAMUSCULAR; INTRAVENOUS at 12:22

## 2022-10-18 RX ADMIN — FENTANYL CITRATE 25 MCG: 50 INJECTION, SOLUTION INTRAMUSCULAR; INTRAVENOUS at 10:58

## 2022-10-18 RX ADMIN — Medication 2000 MG: at 11:14

## 2022-10-18 RX ADMIN — PHENYLEPHRINE HYDROCHLORIDE 100 MCG: 10 INJECTION INTRAVENOUS at 11:31

## 2022-10-18 RX ADMIN — FENTANYL CITRATE 25 MCG: 50 INJECTION, SOLUTION INTRAMUSCULAR; INTRAVENOUS at 12:12

## 2022-10-18 RX ADMIN — PHENYLEPHRINE HYDROCHLORIDE 100 MCG: 10 INJECTION INTRAVENOUS at 11:25

## 2022-10-18 RX ADMIN — FENTANYL CITRATE 25 MCG: 50 INJECTION, SOLUTION INTRAMUSCULAR; INTRAVENOUS at 11:09

## 2022-10-18 RX ADMIN — PHENYLEPHRINE HYDROCHLORIDE 100 MCG: 10 INJECTION INTRAVENOUS at 11:14

## 2022-10-18 RX ADMIN — LIDOCAINE HYDROCHLORIDE 100 MG: 20 INJECTION, SOLUTION EPIDURAL; INFILTRATION; INTRACAUDAL; PERINEURAL at 10:58

## 2022-10-18 RX ADMIN — PHENYLEPHRINE HYDROCHLORIDE 100 MCG: 10 INJECTION INTRAVENOUS at 11:00

## 2022-10-18 RX ADMIN — Medication 324 MICRO CURIE: at 08:26

## 2022-10-18 RX ADMIN — ACETAMINOPHEN 1000 MG: 500 TABLET, FILM COATED ORAL at 07:29

## 2022-10-18 ASSESSMENT — PAIN - FUNCTIONAL ASSESSMENT: PAIN_FUNCTIONAL_ASSESSMENT: NONE - DENIES PAIN

## 2022-10-18 ASSESSMENT — PAIN SCALES - GENERAL: PAINLEVEL_OUTOF10: 4

## 2022-10-18 ASSESSMENT — PAIN DESCRIPTION - LOCATION: LOCATION: LEG

## 2022-10-18 ASSESSMENT — PAIN DESCRIPTION - ORIENTATION: ORIENTATION: RIGHT

## 2022-10-18 NOTE — ANESTHESIA PROCEDURE NOTES
Airway  Date/Time: 10/18/2022 11:00 AM  Urgency: elective    Airway not difficult    General Information and Staff    Patient location during procedure: OR  Resident/CRNA: Darya Gray APRN - CRNA  Other anesthesia staff: Quintin Reynolds RN  Performed: resident/CRNA and other anesthesia staff     Indications and Patient Condition  Indications for airway management: anesthesia  Spontaneous Ventilation: absent  Sedation level: deep  Preoxygenated: yes  Patient position: sniffing  Mask difficulty assessment: vent by bag mask    Final Airway Details  Final airway type: supraglottic airway      Successful airway: oropharyngeal  Size 5     Number of attempts at approach: 1  Ventilation between attempts: supraglottic airway  Number of other approaches attempted: 0    Additional Comments  Placed by [facility]

## 2022-10-18 NOTE — ANESTHESIA POSTPROCEDURE EVALUATION
Department of Anesthesiology  Postprocedure Note    Patient: Levi Callahan  MRN: [id number]  YOB: 1945  Date of evaluation: 10/18/2022      Procedure Summary     Date: 10/18/22 Room / Location: [facility] MAIN OR 08 / [facility] MAIN OR    Anesthesia Start: 1048 Anesthesia Stop: 1237    Procedures:       wide local excision right posterior calf melanoma with (Right: Leg Lower)      SENTINEL LYMPH NODE EXCISION RIGHT INGUINAL (Right)      SPLIT THICKNESS SKIN GRAFT, RIGHT LEG (Right: Leg Lower) Diagnosis:       Melanoma of right posterior calf (HCC)      ([facility] [C43.71])    Providers: John Macias MD Responsible Provider: Wayne Seip, MD    Anesthesia Type: general ASA Status: 2          Anesthesia Type: No value filed.     Stephen Phase I: Stephen Score: 4    Stephen Phase II: Stephen Score: 10      Anesthesia Post Evaluation    Patient location during evaluation: PACU  Patient participation: complete - patient participated  Level of consciousness: awake and alert  Airway patency: patent  Nausea & Vomiting: no nausea  Complications: no  Cardiovascular status: blood pressure returned to baseline  Respiratory status: acceptable  Hydration status: euvolemic  Multimodal analgesia pain management approach

## 2022-10-18 NOTE — ANESTHESIA PRE PROCEDURE
Department of Anesthesiology  Preprocedure Note       Name:  Abelardo Patient   Age:  65 y.o.  :  1945                                          MRN:  [id number]         Date:  10/18/2022      Surgeon: Windy Vasquez):  Dereje Rosales MD    Procedure: Procedure(s):  wide local excision right posterior calf melanoma with  excision sentinel node/ LYMPHO @ 0830  split thickness skin graft from right thigh    Medications prior to admission:   Prior to Admission medications    Medication Sig Start Date End Date Taking?  Authorizing Provider   acetaminophen (TYLENOL) 500 MG tablet Take 500 mg by mouth every 6 hours as needed for Pain   Yes Historical Provider, MD   losartan-hydroCHLOROthiazide (HYZAAR) 100-25 MG per tablet Take 1 tablet by mouth daily    Historical Provider, MD   apixaban (ELIQUIS) 5 MG TABS tablet Take 5 mg by mouth 2 times daily Hold 48 hours prior to surgery per medication hold clearance / [address] [facility]    Historical Provider, MD   atorvastatin (LIPITOR) 20 MG tablet Take 20 mg by mouth every morning    Historical Provider, MD   cetirizine (ZYRTEC) 10 MG tablet Take 10 mg by mouth daily    Historical Provider, MD   metoprolol tartrate (LOPRESSOR) 25 MG tablet Take 25 mg by mouth 2 times daily    Historical Provider, MD   Multiple Vitamin (MULTIVITAMINS PO) Take 1 tablet by mouth daily    Historical Provider, MD   aspirin 81 MG EC tablet Take 81 mg by mouth daily 10/17/22 10/18/22  Historical Provider, MD       Current medications:    Current Facility-Administered Medications   Medication Dose Route Frequency Provider Last Rate Last Admin    lidocaine 1 % injection 1 mL  1 mL IntraDERmal Once PRN Rika Monique IV, MD        lactated ringers infusion   IntraVENous Continuous Rika Monique IV,  mL/hr at 10/18/22 0726 New Bag at 10/18/22 0726    sodium chloride flush 0.9 % injection 5-40 mL  5-40 mL IntraVENous 2 times per day Rika Monique IV, MD        sodium chloride flush 0.9 % injection 5-40 mL  5-40 mL IntraVENous PRN Christi Ridley IV, MD        0.9 % sodium chloride infusion   IntraVENous PRN Christi Ridley IV, MD        midazolam PF (VERSED) injection 2 mg  2 mg IntraVENous Once PRN Christi Ridley IV, MD        ceFAZolin (ANCEF) 2000 mg in sterile water 20 mL IV syringe  2,000 mg IntraVENous On Call to [address] [facility] [address] [facility] [address], MD           Allergies:     Allergies   Allergen Reactions    Lisinopril Cough       Problem List:    Patient Active Problem List   Diagnosis Code    Melanoma of right posterior calf ([facility] C43.71       Past Medical History:        Diagnosis Date    History of cardioversion 09/01/2022    converted then a fib returned 2 weeks    Hyperlipidemia     Hypertension     New onset a-fib ([facility] 08/17/2022    cardioversion @ [facility] 9/1/22-Afib returned 2 weeks later- Eliquis & Metoprolol- [facility]       Past Surgical History:        Procedure Laterality Date    CARDIOVERSION  09/01/2022    & MONICA    CATARACT EXTRACTION      PENILE PROSTHESIS         Social History:    Social History     Tobacco Use    Smoking status: Never    Smokeless tobacco: Never   Substance Use Topics    Alcohol use: Not Currently                                Counseling given: Not Answered      Vital Signs (Current):   Vitals:    10/18/22 0718   BP: (!) 143/81   Pulse: 89   Resp: 16   Temp: 98 °F (36.7 °C)   TempSrc: Oral   SpO2: 99%   Weight: 172 lb 9.6 oz (78.3 kg)   Height: 5' 11\" (1.803 m)                                              BP Readings from Last 3 Encounters:   10/18/22 (!) 143/81   10/11/22 134/76   09/21/22 130/80       NPO Status: Time of last liquid consumption: 0000                        Time of last solid consumption: 0000                        Date of last liquid consumption: 10/17/22                        Date of last solid food consumption: 10/17/22    BMI:   Wt Readings from Last 3 Encounters:   10/18/22 172 lb 9.6 oz (78.3 kg)   10/11/22 176 lb 12.8 oz (80.2 kg)   09/21/22 172 lb 12.8 oz (78.4 kg)     Body mass index is 24.07 kg/m². CBC:   Lab Results   Component Value Date/Time    HGB 13.5 10/11/2022 02:24 PM       CMP:   Lab Results   Component Value Date/Time    K 3.8 10/11/2022 02:24 PM    CREATININE 0.76 10/11/2022 02:24 PM       POC Tests:   Recent Labs     10/18/22  0717   POCK 3.6       Coags: No results found for: PROTIME, INR, APTT    HCG (If Applicable): No results found for: PREGTESTUR, PREGSERUM, HCG, HCGQUANT     ABGs: No results found for: PHART, PO2ART, SEP4ZUW, ZME3SGO, BEART, F6KJIVUK     Type & Screen (If Applicable):  No results found for: LABABO, LABRH    Drug/Infectious Status (If Applicable):  No results found for: HIV, HEPCAB    COVID-19 Screening (If Applicable): No results found for: COVID19        Anesthesia Evaluation  Patient summary reviewed and Nursing notes reviewed  Airway: Mallampati: II  TM distance: >3 FB   Neck ROM: full  Mouth opening: > = 3 FB   Dental: normal exam         Pulmonary:Negative Pulmonary ROS breath sounds clear to auscultation                             Cardiovascular:  Exercise tolerance: good (>4 METS),   (+) hypertension:, dysrhythmias: atrial fibrillation,         Rhythm: irregular  Rate: normal                    Neuro/Psych:   Negative Neuro/Psych ROS              GI/Hepatic/Renal:        (-) GERD       Endo/Other: Negative Endo/Other ROS                    Abdominal:             Vascular: negative vascular ROS. Other Findings:           Anesthesia Plan      general     ASA 2       Induction: intravenous. Anesthetic plan and risks discussed with patient and spouse.                         Stephanie Larson MD   10/18/2022

## 2022-10-18 NOTE — DISCHARGE INSTRUCTIONS
Dressings/Wound Care  Leave dressings alone until follow-up  Try to keep incisions as dry as possible to lower risk of infection. A \"cast cover\" from [employer] Chelo may come in handy to help keep lower leg dressing dry    Activity  No heavy lifting (>5lbs) for 6 weeks to reduce risk of developing swelling. No driving until you are off pain meds for 24hrs and have no pain with movements associated with driving. Pain prescription (Norco) electronically sent to your pharmacy  Follow-up with Dr Beth Helton in 6 days on Monday 10/24/22 at 1PM in the office (The appt has already been scheduled!)  [address], [address]  ([phone])    Diet  Resume prior diet    Resume Eliquis approx 7-PM  on 10/19/22    Then Soft diet until follow-up       ACTIVITY  As tolerated and as directed by your doctor. You may shower in 24 hours. Do not take a bath until cleared by MD.     DIET  Clear liquids until no nausea or vomiting, then light diet for the first day. Advance to regular diet on second day, unless your doctor orders otherwise. If nausea and vomiting continues, call your doctor. PAIN  Take pain medication as directed by your doctor. Call your doctor if pain is NOT relieved by medication. CALL YOUR DOCTOR IF   Excessive bleeding that does not stop after holding pressure over the area. Temperature of 101 degrees F or above. Excessive redness, swelling or bruising, and/or green or yellow, smelly discharge from incision.     After general anesthesia or intravenous sedation, for 24 hours or while taking prescription Narcotics:  Limit your activities  A responsible adult needs to be with you for the next 24 hours  Do not drive and operate hazardous machinery  Do not make important personal or business decisions  Do not drink alcoholic beverages  If you have not urinated within 8 hours after discharge, and you are experiencing discomfort from urinary retention, please go to the nearest ED.  If you have sleep apnea and have a CPAP machine, please use it for all naps and sleeping. Please use caution when taking narcotics and any of your home medications that may cause drowsiness. *  Please give a list of your current medications to your Primary Care Provider. *  Please update this list whenever your medications are discontinued, doses are      changed, or new medications (including over-the-counter products) are added. *  Please carry medication information at all times in case of emergency situations. These are general instructions for a healthy lifestyle:  No smoking/ No tobacco products/ Avoid exposure to second hand smoke  Surgeon General's Warning:  Quitting smoking now greatly reduces serious risk to your health. Obesity, smoking, and sedentary lifestyle greatly increases your risk for illness  A healthy diet, regular physical exercise & weight monitoring are important for maintaining a healthy lifestyle    You may be retaining fluid if you have a history of heart failure or if you experience any of the following symptoms:  Weight gain of 3 pounds or more overnight or 5 pounds in a week, increased swelling in our hands or feet or shortness of breath while lying flat in bed. Please call your doctor as soon as you notice any of these symptoms; do not wait until your next office visit.

## 2022-10-21 NOTE — OP NOTE
[facility]  OPERATIVE REPORT    Name:  Michelle Leong  MR#:  [id number]  :  1945  ACCOUNT #:  [de-identified]  DATE OF SERVICE:  10/18/2022    PREOPERATIVE DIAGNOSIS:  pT3bN0 invasive melanoma of the right posterior calf with Breslow depth 3.4 mm with ulceration, 20 mitotic figures per mm2 with regression, but no lymphovascular invasion or micro satellitosis. POSTOPERATIVE DIAGNOSIS:  pT3bN0 invasive melanoma of the right posterior calf with Breslow depth 3.4 mm with ulceration, 20 mitotic figures per mm2 with regression, but no lymphovascular invasion or micro satellitosis. PROCEDURE PERFORMED:  1. Radical resection of soft tissue of right posterior calf for invasive melanoma (CPT 75753). 2.  Right inguinofemoral sentinel node excision x2 following intraoperative mapping and identification (CPT 12363 - 46 - 61, 24172+). 3.  Split-thickness skin grafting from right anterior thigh to right posterior calf, 20 cm2 (CPT 71772 - 51). 4.  Advancement flap closure of right thigh graft harvest site with primary and secondary wound defect of 32 cm2 (CPT 44661 - 51 - 59)    SURGEON:  Chandler Prado MD    ASSISTANT:  None. ANESTHESIA:  General.    COMPLICATIONS:  None. SPECIMENS REMOVED:  Two right inguinofemoral nodes and wide excision of right posterior calf sent to Pathology for review. IMPLANTS:  none    ESTIMATED BLOOD LOSS:  Less than 40 mL. OPERATIVE PROCEDURE:  The patient was taken to the operating room, placed in supine position. After adequate anesthesia was given, his right groin and right anterior thigh were prepped and draped in sterile fashion with multiple layers of Betadine scrub and Betadine solution. Time-out protocol was followed. He has given prophylactic antibiotics. An oblique incision was made in the right groin using the Neoprobe intraoperatively to map and identify the right inguinofemoral sentinel nodes.   The patient was injected just prior to surgery with technetium-labeled sulfur colloid as a tracer. The Neoprobe was used to guide our dissection into the deep right groin space where we identified two sentinel nodes having a count of 450 each. Each node was circumferentially excised intact by clipping lymphatic and venous tributaries. Both nodes were checked once removed and had counts noted above. The background in the inguinal region following the node excisions was 0. Irrigation was used. Hemostasis was confirmed. There was no evidence of gross or palpable disease remaining. A local anesthetic was given. The incision was closed with interrupted deep dermal 3-0 Vicryl sutures and skin clips. Sterile dressing was placed later in the case consisting of 4x4s and Tegaderms. Attention was then turned towards the anterior thigh where we hand harvested a carlie-shaped split-thickness skin graft which we used later in the case. A 20 cm2 split-thickness skin graft was hand harvested using the #15 scalpel. The graft was fenestrated in situ as it was being harvested. It was placed in the back table. It was covered with saline moistened gauze. The harvest site of the right anterior thigh was then closed primarily with advancement flaps which were first required us to excise all of the dermis from the base of the graft harvest site. Once the dermis was excised this gave us access to the skin edges medially and laterally and mobility as well. We undermined skin flaps on both sides to create advancement flaps and create a primary and secondary wound defect of 32 cm2. This allowed us to pull the advancement flaps together into apposition to close the wound completely resulting in a linear, longitudinally oriented thigh graft harvest site closure which was pulled together into apposition with 2-0 nylon retention sutures after local anesthetic and irrigation were used and hemostasis was confirmed.   Once the retention sutures were tied, skin clips were placed beneath them to reinforce the closure and the advancement flaps were successful in achieving complete closure of the thigh graft harvest site for better cosmesis, quicker healing, less risk of infection, and less postoperative pain. This site was covered with 4x4s and Tegaderms. The groin incision for the node excisions was also covered and dressed sterilely. The patient was then repositioned in the decubitus position with an axillary roll and his right posterior calf region was prepped and draped in a sterile fashion with multiple layers of Betadine scrub and Betadine solution. He was redraped. A carlie-shaped incision was marked out to create a 2 cm grossly negative margin. This created a 7 x 5 cm excision. A wide radical excision of soft tissue and skin was performed in this area down to and including fascia of the posterior calf over the gastrocnemius. Dissection was performed with cautery and scalpel. The specimen was marked for orientation and sent to Pathology for review. This wide radical resection of soft tissue left a large wound. This was irrigated. Hemostasis was confirmed. There was no evidence of gross or palpable disease remaining. As much of this wound as possible was closed with nylon sutures. The central aspect of the wound which could not be closed was grafted with a 20 cm2 split-thickness skin graft harvested previously. The graft was secured peripherally with skin clips. It was used to cover the base of the wound and the entire remaining wound. The graft was covered with Mepilex transfer, 4x4s and the entire right leg was wrapped with Kerlix and Coban to reinforce and provide some gentle compression and to cover and protect the graft. The patient was taken to Recovery in good condition. He tolerated the procedure well. There were no immediate complications.         Judy Roman MD      [id number]  D:  10/21/2022 8:19  T:  10/21/2022 12:40  JOB #:  [id number]

## 2022-10-24 ENCOUNTER — OFFICE VISIT (OUTPATIENT)
Dept: SURGERY | Age: 77
End: 2022-10-24

## 2022-10-24 ENCOUNTER — HOSPITAL ENCOUNTER (OUTPATIENT)
Dept: GENERAL RADIOLOGY | Age: 77
Discharge: HOME | End: 2022-10-27
Payer: MEDICARE

## 2022-10-24 VITALS — BODY MASS INDEX: 24.08 KG/M2 | OXYGEN SATURATION: 98 % | HEART RATE: 76 BPM | WEIGHT: 172 LBS | HEIGHT: 71 IN

## 2022-10-24 DIAGNOSIS — C43.71 MELANOMA OF RIGHT POSTERIOR CALF (HCC): ICD-10-CM

## 2022-10-24 DIAGNOSIS — S71.101D OPEN WOUND OF RIGHT THIGH, SUBSEQUENT ENCOUNTER: ICD-10-CM

## 2022-10-24 DIAGNOSIS — C43.71 MALIGNANT MELANOMA OF RIGHT LOWER EXTREMITY INCLUDING HIP (HCC): ICD-10-CM

## 2022-10-24 DIAGNOSIS — C43.71 MALIGNANT MELANOMA OF RIGHT LOWER EXTREMITY INCLUDING HIP (HCC): Primary | ICD-10-CM

## 2022-10-24 LAB
ALBUMIN SERPL-MCNC: 4.1 G/DL (ref 3.2–4.6)
ALBUMIN/GLOB SERPL: 1.2 {RATIO} (ref 0.4–1.6)
ALP SERPL-CCNC: 103 U/L (ref 50–136)
ALT SERPL-CCNC: 37 U/L (ref 12–65)
ANION GAP SERPL CALC-SCNC: 3 MMOL/L (ref 2–11)
AST SERPL-CCNC: 25 U/L (ref 15–37)
BILIRUB SERPL-MCNC: 0.4 MG/DL (ref 0.2–1.1)
BUN SERPL-MCNC: 20 MG/DL (ref 8–23)
CALCIUM SERPL-MCNC: 9.9 MG/DL (ref 8.3–10.4)
CHLORIDE SERPL-SCNC: 106 MMOL/L (ref 101–110)
CO2 SERPL-SCNC: 29 MMOL/L (ref 21–32)
CREAT SERPL-MCNC: 1 MG/DL (ref 0.8–1.5)
GLOBULIN SER CALC-MCNC: 3.4 G/DL (ref 2.8–4.5)
GLUCOSE SERPL-MCNC: 93 MG/DL (ref 65–100)
POTASSIUM SERPL-SCNC: 4.3 MMOL/L (ref 3.5–5.1)
PROT SERPL-MCNC: 7.5 G/DL (ref 6.3–8.2)
SODIUM SERPL-SCNC: 138 MMOL/L (ref 133–143)

## 2022-10-24 PROCEDURE — 99024 POSTOP FOLLOW-UP VISIT: CPT | Performed by: SURGERY

## 2022-10-24 PROCEDURE — 71046 X-RAY EXAM CHEST 2 VIEWS: CPT

## 2022-10-24 NOTE — PROGRESS NOTES
H&P/Consult Note/Progress Note/Office Note:   Bree Viveros  MRN: [id number]  CQW:[birth year]  Age:77 y.o.    HPI: Bree Viveros is a 65 y.o. male who is s/p WLE with STSG of a cT3bN0, pT3bN1 right posterior calf melanoma and right inguinofemoral sent node excision on 10/18/22    He had a deep margin which was close but negative. We did remove the gastrocnemius fascia at the deep aspect of the incision  One of the 2 right inguinofemoral nodes was positive for metastasis. Prior to surgery he was referred by Dr. Juhi Goodwin for evaluation of cT3bN0 malignant melanoma of the right calf. He had a shave biopsy on 8/24/22 by Dr. Kanika Rodriguez of the right distal calf which identified a malignant melanoma   Breslow 3.4 mm; IV; +ulceration, +regression; 20 mitosis/ sq mm; no microsatellitosis; no LVI    Lesion developed over several months and was getting larger in size. Nothing in particular made it better or worse. No associated weight loss. He takes a blood thinner but does not know the name.                       Past Medical History:   Diagnosis Date    History of cardioversion 09/01/2022    converted then a fib returned 2 weeks    Hyperlipidemia     Hypertension     New onset a-fib ([address] 08/17/2022    cardioversion @ [facility] 9/1/22-Afib returned 2 weeks later- Eliquis & Metoprolol- [address] [facility]     Past Surgical History:   Procedure Laterality Date    CARDIOVERSION  09/01/2022    & [facility]    CATARACT EXTRACTION      LEG BIOPSY EXCISION Right 10/18/2022    wide local excision right posterior calf melanoma with performed by Caprice Gunderson MD at [place] [address] Right 10/18/2022    SENTINEL LYMPH NODE EXCISION RIGHT INGUINAL performed by Caprice Gunderson MD at [address] [place] [address] Right 10/18/2022    SPLIT THICKNESS SKIN GRAFT, RIGHT LEG performed by Caprice Gunderson MD at [facility] MAIN OR     Current Outpatient Medications   Medication Sig HYDROcodone-acetaminophen (NORCO) 5-325 MG per tablet Take 1-2 tablets by mouth every 8 hours as needed for Pain for up to 7 days. losartan-hydroCHLOROthiazide (HYZAAR) 100-25 MG per tablet Take 1 tablet by mouth daily    apixaban (ELIQUIS) 5 MG TABS tablet Take 5 mg by mouth 2 times daily Hold 48 hours prior to surgery per medication hold [facility]    atorvastatin (LIPITOR) 20 MG tablet Take 20 mg by mouth every morning    cetirizine (ZYRTEC) 10 MG tablet Take 10 mg by mouth daily    metoprolol tartrate (LOPRESSOR) 25 MG tablet Take 25 mg by mouth 2 times daily    Multiple Vitamin (MULTIVITAMINS PO) Take 1 tablet by mouth daily    aspirin 81 MG EC tablet Take 81 mg by mouth daily     No current facility-administered medications for this visit. ALLERGIES:  Lisinopril    Social History     Socioeconomic History    Marital status:    Tobacco Use    Smoking status: Never    Smokeless tobacco: Never   Vaping Use    Vaping Use: Never used   Substance and Sexual Activity    Alcohol use: Not Currently    Drug use: Not Currently     Social History     Tobacco Use   Smoking Status Never   Smokeless Tobacco Never     Family History   Problem Relation Age of Onset    Cancer Mother      ROS: The patient has no difficulty with chest pain or shortness of breath. No fever or chills. Comprehensive review of systems was otherwise unremarkable except as noted above. Physical Exam:   Pulse 76   Ht 5' 11\" (1.803 m)   Wt 172 lb (78 kg)   SpO2 98%   BMI 23.99 kg/m²   Vitals:    10/24/22 1314   Pulse: 76   SpO2: 98%   Weight: 172 lb (78 kg)   Height: 5' 11\" (1.803 m)       [facility]@  [unfilled]    Constitutional: Alert, oriented, cooperative patient in no acute distress; appears stated age    Eyes:Sclera are clear. EOMs intact  ENMT: no external lesions gross hearing normal; no obvious neck masses, no ear or lip lesions, nares normal  CV: RRR. Normal perfusion  Resp: No JVD.   Breathing is non-labored; no audible wheezing. GI: soft and non-distended       Healing right inguinofemoral, right anterior thigh, and right calf incision sites   No satellite nodules  No regional adenopathy. Musculoskeletal: unremarkable with normal function. No embolic signs or cyanosis. Neuro:  Oriented; moves all 4; no focal deficits  Psychiatric: normal affect and mood, no memory impairment    Recent vitals (if inpt):  @IPVITALS(24:)@    Amount and/or Complexity of Data Reviewed and Analyzed:  I reviewed and analyzed all of the unique labs and radiologic studies that are shown below as well as any that are in the HPI, and any that are in the expanded problem list below  *Each unique test, order, or document contributes to the combination of 2 or combination of 3 in Category 1 below. For this visit I also reviewed old records and prior notes. No results for input(s): WBC, HGB, PLT, NA, K, CL, CO2, BUN, CREA, GLU, INR, APTT, ALT, AML, AML, LCAD, PCO2, PO2, HCO3 in the last 72 hours. Invalid input(s): PTP, TBIL, TBILI, CBIL, SGOT, GPT, AP, LPSE, NH4, TROPT, TROIQ,  PH  Review of most recent CBC  Lab Results   Component Value Date    HGB 13.5 (L) 10/11/2022       Review of most recent BMP  Lab Results   Component Value Date/Time    K 3.8 10/11/2022 02:24 PM    CREATININE 0.76 10/11/2022 02:24 PM        Review of most recent LFTs (and lipase if done)  No results found for: ALT, AST, GGT, ALKPHOS, BILITOT  No results found for: LIPASE    No results found for: INR, APTT, LCAD    Review of most recent HgbA1c  No results found for: LABA1C  No results found for: EAG    Nutritional assessment screen for wound healing issues:  No results found for: LABPROT, LABALBU    @lastcovr@    Xray Result (most recent):  No results found for this or any previous visit from the past 3650 days. CT Result (most recent):  No results found for this or any previous visit from the past 3650 days. US Result (most recent):   No results found for this or any previous visit from the past 3650 days. Admission date (for inpatients): (Not on file)   * No surgery found *  * No surgery found *        ASSESSMENT/PLAN:  [unfilled]  Active Problems:    * No active hospital problems. *  Resolved Problems:    * No resolved hospital problems. *     Patient Active Problem List    Diagnosis Date Noted    Open wound of right thigh 10/18/2022     Priority: Medium    Malignant melanoma of right lower extremity including hip ([address] 10/18/2022     Priority: Medium    Melanoma of right posterior calf ([address] 09/21/2022     Priority: Medium     10/18/22 s/p WLE with STSG right posterior calf melanoma with right inguinofemoral sent node excision; Dr Kianna Penny, pT3bN1  DIAGNOSIS   A:  \"RIGHT INGUINOFEMORAL SENTINEL NODE 2\":  METASTATIC MELANOMA PRESENT. B:  \"WIDE LOCAL EXCISION RIGHT POSTERIOR CALF\":  MALIGNANT MELANOMA   CONFINED TO DERMIS AND SUBCUTANEOUS TISSUE, 3.9 MM IN DEPTH, DELORES'S LEVEL   5, TUMOR WITHIN LESS THAN 1 MM OF THE DEEP MARGIN, GREATER THAN 5 MM FROM   ALL PERIPHERAL MARGINS. SEPARATE FOCUS OF MELANOMA IN SITU AT AREA MARKED   BY SUTURE, MARGINS UNINVOLVED. C:  \"RIGHT INGUINOFEMORAL SENTINEL NODE 1\":  BENIGN LYMPH NODE, NEGATIVE FOR TUMOR. Sign Out Date: 10/20/2022  John Finley MD               Number and Complexity of Problems addressed and   Risks of complications and/or morbidity of management      pT3bN0 right posterior calf malignant melanoma  He is s/p WLE with STSG of a cT3bN0, pT3bN1 right posterior calf melanoma and right inguinofemoral sent node excision on 10/18/22    He had a deep margin which was close but negative. We did remove the gastrocnemius fascia at the deep aspect of the incision  One of the 2 right inguinofemoral nodes was positive for metastasis.       I discussed the seriousness of his condition with he and his wife on 9/21/22 and 10/24/22    We will have a chest x-ray and LFTs done today as these did not get done preoperatively as ordered  I will see him back in 1 week for the results and another dressing change  He will likely need PET/CT imaging and oncology referral as well. He informs me he has recently been diagnosed with prostate carcinoma and is considering his options. Level of MDM (2/3 elements below)  Number and Complexity of Problems Addressed Amount and/or Complexity of Data to be Reviewed and Analyzed  *Each unique test, order, or document contributes to the combination of 2 or combination of 3 in Category 1 below. Risk of Complications and/or Morbidity or Mortality of pt Management     88089  77796 SF Minimal  ?1self-limited or minor problem Minimal or none Minimal risk of morbidity from additional diagnostic testing or Rx   45078  31718 Low Low  ? 2or more self-limited or minor problems;    or  ? 1stable chronic illness;    or  ?1acute, uncomplicated illness or injury   Limited  (Must meet the requirements of at least 1 of the 2 categories)  Category 1: Tests and documents   ? Any combination of 2 from the following:  ?Review of prior external note(s) from each unique source*;  ?review of the result(s) of each unique test*;   ?ordering of each unique test*    or   Category 2: Assessment requiring an independent historian(s)  (For the categories of independent interpretation of tests and discussion of management or test interpretation, see moderate or high) Low risk of morbidity from additional diagnostic testing or treatment     09502  73534 Mod Moderate  ? 1or more chronic illnesses with exacerbation, progression, or side effects of treatment;    or  ?2or more stable chronic illnesses;    or  ?1undiagnosed new problem with uncertain prognosis;    or  ?1acute illness with systemic symptoms;    or  ?1acute complicated injury   Moderate  (Must meet the requirements of at least 1 out of 3 categories)  Category 1: Tests, documents, or independent historian(s)  ? Any combination of 3 from the following:   ?Review of prior external note(s) from each unique source*;  ?Review of the result(s) of each unique test*;  ?Ordering of each unique test*;  ?Assessment requiring an independent historian(s)    or  Category 2: Independent interpretation of tests   ? Independent interpretation of a test performed by another physician/other qualified health care professional (not separately reported);     or  Category 3: Discussion of management or test interpretation  ? Discussion of management or test interpretation with external physician/other qualified health care professional/appropriate source (not separately reported)   Moderate risk of morbidity from additional diagnostic testing or treatment  Examples only:  ?Prescription drug management   ? Decision regarding minor surgery with identified patient or procedure risk factors  ? Decision regarding elective major surgery without identified patient or procedure risk factors   ? Diagnosis or treatment significantly limited by social determinants of health       46503  32751 High High  ? 1or more chronic illnesses with severe exacerbation, progression, or side effects of treatment;    or  ?1 acute or chronic illness or injury that poses a threat to life or bodily function   Extensive  (Must meet the requirements of at least 2 out of 3 categories)  Category 1: Tests, documents, or independent historian(s)  ? Any combination of 3 from the following:   ?Review of prior external note(s) from each unique source*;  ?Review of the result(s) of each unique test*;   ?Ordering of each unique test*;   ?Assessment requiring an independent historian(s)    or   Category 2: Independent interpretation of tests   ? Independent interpretation of a test performed by another physician/other qualified health care professional (not separately reported);     or  Category 3: Discussion of management or test interpretation  ? Discussion of management or test interpretation with external physician/other qualified health care professional/appropriate source (not separately reported)   High risk of morbidity from additional diagnostic testing or treatment  Examples only:  ?Drug therapy requiring intensive monitoring for toxicity  ? Decision regarding elective major surgery with identified patient or procedure risk factors  ? Decision regarding emergency major surgery  ? Decision regarding hospitalization  ? Decision not to resuscitate or to de-escalate care because of poor prognosis             I have personally performed a face-to-face diagnostic evaluation and management  service on this patient. I have independently seen the patient. I have independently obtained the above history from the patient/family. I have independently examined the patient with above findings. I have independently reviewed data/labs for this patient and developed the above plan of care (MDM).       Signed: Radhames Jones MD  10/24/2022    1:46 PM

## 2022-10-28 ENCOUNTER — OFFICE VISIT (OUTPATIENT)
Dept: SURGERY | Age: 77
End: 2022-10-28

## 2022-10-28 VITALS — BODY MASS INDEX: 24.08 KG/M2 | HEIGHT: 71 IN | WEIGHT: 172 LBS

## 2022-10-28 DIAGNOSIS — C43.71 MELANOMA OF RIGHT POSTERIOR CALF (HCC): Primary | ICD-10-CM

## 2022-10-28 PROCEDURE — 99024 POSTOP FOLLOW-UP VISIT: CPT | Performed by: SURGERY

## 2022-10-28 RX ORDER — SULFAMETHOXAZOLE AND TRIMETHOPRIM 400; 80 MG/1; MG/1
1 TABLET ORAL DAILY
Qty: 20 TABLET | Refills: 0 | Status: SHIPPED | OUTPATIENT
Start: 2022-10-28 | End: 2022-11-07

## 2022-10-28 NOTE — PROGRESS NOTES
H&P/Consult Note/Progress Note/Office Note:   Deena Aquino  MRN: [id number]  OZR:1/3/8428  Age:77 y.o.    HPI: Deena Aquino is a 65 y.o. male who is s/p WLE with STSG of a cT3bN0, pT3bN1 right posterior calf melanoma and right inguinofemoral sent node excision on 10/18/22    He had a deep margin which was close but negative. We did remove the gastrocnemius fascia at the deep aspect of the incision  One of the 2 right inguinofemoral nodes was positive for metastasis. Prior to surgery he was referred by Dr. Erin Haddad for evaluation of cT3bN0 malignant melanoma of the right calf. He had a shave biopsy on 8/24/22 by Dr. Ashkan Lomas of the right distal calf which identified a malignant melanoma   Breslow 3.4 mm; IV; +ulceration, +regression; 20 mitosis/ sq mm; no microsatellitosis; no LVI    Lesion developed over several months and was getting larger in size. Nothing in particular made it better or worse. No associated weight loss. He takes a blood thinner but does not know the name.                 10/24/22 CXR (baseline): No nodules  10/24/22 LFTs (baseline): normal      Past Medical History:   Diagnosis Date    History of cardioversion 09/01/2022    converted then a fib returned 2 weeks    Hyperlipidemia     Hypertension     New onset a-fib ([facility] [address] 08/17/2022    cardioversion @ [facility] 9/1/22-Afib returned 2 weeks later- Eliquis & Metoprolol- [facility]     Past Surgical History:   Procedure Laterality Date    CARDIOVERSION  09/01/2022    & MONICA    CATARACT EXTRACTION      LEG BIOPSY EXCISION Right 10/18/2022    wide local excision right posterior calf melanoma with performed by Lawanda Pavon MD at [place] [address] Right 10/18/2022    SENTINEL LYMPH NODE EXCISION RIGHT INGUINAL performed by Lawanda Pavon MD at [address] [place] [address] Right 10/18/2022    SPLIT THICKNESS SKIN GRAFT, RIGHT LEG performed by Lawanda Pavon MD at [address] [place] [address] Current Outpatient Medications   Medication Sig    sulfamethoxazole-trimethoprim (BACTRIM) 400-80 MG per tablet Take 1 tablet by mouth daily for 10 days    losartan-hydroCHLOROthiazide (HYZAAR) 100-25 MG per tablet Take 1 tablet by mouth daily    apixaban (ELIQUIS) 5 MG TABS tablet Take 5 mg by mouth 2 times daily Hold 48 hours prior to surgery per medication hold [facility]    atorvastatin (LIPITOR) 20 MG tablet Take 20 mg by mouth every morning    cetirizine (ZYRTEC) 10 MG tablet Take 10 mg by mouth daily    metoprolol tartrate (LOPRESSOR) 25 MG tablet Take 25 mg by mouth 2 times daily    Multiple Vitamin (MULTIVITAMINS PO) Take 1 tablet by mouth daily    aspirin 81 MG EC tablet Take 81 mg by mouth daily     No current facility-administered medications for this visit. ALLERGIES:  Lisinopril    Social History     Socioeconomic History    Marital status:      Spouse name: None    Number of children: None    Years of education: None    Highest education level: None   Tobacco Use    Smoking status: Never    Smokeless tobacco: Never   Vaping Use    Vaping Use: Never used   Substance and Sexual Activity    Alcohol use: Not Currently    Drug use: Not Currently     Social History     Tobacco Use   Smoking Status Never   Smokeless Tobacco Never     Family History   Problem Relation Age of Onset    Cancer Mother      ROS: The patient has no difficulty with chest pain or shortness of breath. No fever or chills. Comprehensive review of systems was otherwise unremarkable except as noted above. Physical Exam:   Ht 5' 11\" (1.803 m)   Wt 172 lb (78 kg)   BMI 23.99 kg/m²   Vitals:    10/28/22 0806   Weight: 172 lb (78 kg)   Height: 5' 11\" (1.803 m)       @[facility]  @[facility]@    Constitutional: Alert, oriented, cooperative patient in no acute distress; appears stated age    Eyes:Sclera are clear.  EOMs intact  ENMT: no external lesions gross hearing normal; no obvious neck masses, no ear or lip lesions, nares normal  CV: RRR. Normal perfusion  Resp: No JVD. Breathing is  non-labored; no audible wheezing. GI: soft and non-distended       Healing right inguinofemoral, right anterior thigh, and right calf incision sites   10/28/22 25cc seroma aspirated right groin    No satellite nodules  No regional adenopathy. Musculoskeletal: unremarkable with normal function. No embolic signs or cyanosis. Neuro:  Oriented; moves all 4; no focal deficits  Psychiatric: normal affect and mood, no memory impairment    Recent vitals (if inpt):  @IPVITALS(24:)@    Amount and/or Complexity of Data Reviewed and Analyzed:  I reviewed and analyzed all of the unique labs and radiologic studies that are shown below as well as any that are in the HPI, and any that are in the expanded problem list below  *Each unique test, order, or document contributes to the combination of 2 or combination of 3 in Category 1 below. For this visit I also reviewed old records and prior notes. No results for input(s): WBC, HGB, PLT, NA, K, CL, CO2, BUN, CREA, GLU, INR, APTT, ALT, AML, AML, LCAD, PCO2, PO2, HCO3 in the last 72 hours.     Invalid input(s): PTP, TBIL, TBILI, CBIL, SGOT, GPT, AP, LPSE, NH4, TROPT, TROIQ,  PH  Review of most recent CBC  Lab Results   Component Value Date    HGB 13.5 (L) 10/11/2022       Review of most recent BMP  Lab Results   Component Value Date/Time     10/24/2022 02:51 PM    K 4.3 10/24/2022 02:51 PM     10/24/2022 02:51 PM    CO2 29 10/24/2022 02:51 PM    BUN 20 10/24/2022 02:51 PM    CREATININE 1.00 10/24/2022 02:51 PM    GLUCOSE 93 10/24/2022 02:51 PM    CALCIUM 9.9 10/24/2022 02:51 PM        Review of most recent LFTs (and lipase if done)  Lab Results   Component Value Date    ALT 37 10/24/2022    AST 25 10/24/2022    ALKPHOS 103 10/24/2022    BILITOT 0.4 10/24/2022     No results found for: LIPASE    No results found for: INR, APTT, LCAD    Review of most recent HgbA1c  No results found for: LABA1C  No results found for: EAG    Nutritional assessment screen for wound healing issues:  Lab Results   Component Value Date    LABALBU 4.1 10/24/2022       @lastcovr@    Xray Result (most recent):  XR CHEST STANDARD TWO VW 10/24/2022    Narrative  EXAMINATION: XR CHEST (2 VW) 10/24/2022 2:40 PM    ACCESSION NUMBER: [id number]    COMPARISON: None available    INDICATION: Malignant melanoma of right lower limb, including hip    TECHNIQUE: PA and lateral views of the chest were obtained. FINDINGS:  Support Devices:  *  None    Cardiac Silhouette: Within normal limits in size. Mediastinum: Normal mediastinal contours. Lungs: No airspace consolidation. No pneumothorax or sizable pleural effusion. Upper Abdomen: Normal    Miscellaneous: No fracture or suspicious osseous lesion. Impression  No acute cardiopulmonary abnormality. CT Result (most recent):  No results found for this or any previous visit from the past 3650 days. US Result (most recent):  No results found for this or any previous visit from the past 3650 days. Admission date (for inpatients): (Not on file)   * No surgery found *  * No surgery found *        ASSESSMENT/PLAN:  [unfilled]  Active Problems:    * No active hospital problems. *  Resolved Problems:    * No resolved hospital problems. *     Patient Active Problem List    Diagnosis Date Noted    Melanoma of right posterior calf ([facility] [address] 09/21/2022     Priority: Medium     10/18/22 s/p WLE with STSG right posterior calf melanoma with right inguinofemoral sent node excision; Dr Dinora Rodríguez, pT3bN1  DIAGNOSIS   A:  \"RIGHT INGUINOFEMORAL SENTINEL NODE 2\":  METASTATIC MELANOMA PRESENT. B:  \"WIDE LOCAL EXCISION RIGHT POSTERIOR CALF\":  MALIGNANT MELANOMA   CONFINED TO DERMIS AND SUBCUTANEOUS TISSUE, 3.9 MM IN DEPTH, DELORES'S LEVEL   5, TUMOR WITHIN LESS THAN 1 MM OF THE DEEP MARGIN, GREATER THAN 5 MM FROM   ALL PERIPHERAL MARGINS.  SEPARATE FOCUS OF MELANOMA IN SITU AT AREA MARKED   BY SUTURE, MARGINS UNINVOLVED. C:  \"RIGHT INGUINOFEMORAL SENTINEL NODE 1\":  BENIGN LYMPH NODE, NEGATIVE FOR TUMOR. Sign Out Date: 10/20/2022  John Astorga MD     10/24/22 CXR, 2 views (baseline)  Cardiac Silhouette: Within normal limits in size. Mediastinum: Normal mediastinal contours. Lungs: No airspace consolidation. No pneumothorax or sizable pleural effusion. Upper Abdomen: Normal   Miscellaneous: No fracture or suspicious osseous lesion. IMPRESSION:  No acute cardiopulmonary abnormality. 10/24/22 LFTs (baseline): normal                Number and Complexity of Problems addressed and   Risks of complications and/or morbidity of management      pT3bN0 right posterior calf malignant melanoma  He is s/p WLE with STSG of a cT3bN0, pT3bN1 right posterior calf melanoma and right inguinofemoral sent node excision on 10/18/22    He had a deep margin which was close but negative. We did remove the gastrocnemius fascia at the deep aspect of the incision  One of the 2 right inguinofemoral nodes was positive for metastasis. I discussed the seriousness of his condition with he and his wife on 9/21/22 and 10/24/22  +seroma aspiration on 10/28/22 from right groin  I will see him in 3 days for another dressing change    He will likely need PET/CT imaging and oncology referral as well. He informs me he has recently been diagnosed with prostate carcinoma and is considering his options. Level of MDM (2/3 elements below)  Number and Complexity of Problems Addressed Amount and/or Complexity of Data to be Reviewed and Analyzed  *Each unique test, order, or document contributes to the combination of 2 or combination of 3 in Category 1 below.  Risk of Complications and/or Morbidity or Mortality of pt Management     [id number] SF Minimal  ?1self-limited or minor problem Minimal or none Minimal risk of morbidity from additional diagnostic testing or Rx 60220  33629 Low Low  ? 2or more self-limited or minor problems;    or  ? 1stable chronic illness;    or  ?1acute, uncomplicated illness or injury   Limited  (Must meet the requirements of at least 1 of the 2 categories)  Category 1: Tests and documents   ? Any combination of 2 from the following:  ?Review of prior external note(s) from each unique source*;  ?review of the result(s) of each unique test*;   ?ordering of each unique test*    or   Category 2: Assessment requiring an independent historian(s)  (For the categories of independent interpretation of tests and discussion of management or test interpretation, see moderate or high) Low risk of morbidity from additional diagnostic testing or treatment     00899  21076 Mod Moderate  ? 1or more chronic illnesses with exacerbation, progression, or side effects of treatment;    or  ?2or more stable chronic illnesses;    or  ?1undiagnosed new problem with uncertain prognosis;    or  ?1acute illness with systemic symptoms;    or  ?1acute complicated injury   Moderate  (Must meet the requirements of at least 1 out of 3 categories)  Category 1: Tests, documents, or independent historian(s)  ? Any combination of 3 from the following:   ?Review of prior external note(s) from each unique source*;  ?Review of the result(s) of each unique test*;  ?Ordering of each unique test*;  ?Assessment requiring an independent historian(s)    or  Category 2: Independent interpretation of tests   ? Independent interpretation of a test performed by another physician/other qualified health care professional (not separately reported);     or  Category 3: Discussion of management or test interpretation  ? Discussion of management or test interpretation with external physician/other qualified health care professional/appropriate source (not separately reported)   Moderate risk of morbidity from additional diagnostic testing or treatment  Examples only:  ?Prescription drug management   ? Decision regarding minor surgery with identified patient or procedure risk factors  ? Decision regarding elective major surgery without identified patient or procedure risk factors   ? Diagnosis or treatment significantly limited by social determinants of health       23125  26200 High High  ? 1or more chronic illnesses with severe exacerbation, progression, or side effects of treatment;    or  ?1 acute or chronic illness or injury that poses a threat to life or bodily function   Extensive  (Must meet the requirements of at least 2 out of 3 categories)  Category 1: Tests, documents, or independent historian(s)  ? Any combination of 3 from the following:   ?Review of prior external note(s) from each unique source*;  ?Review of the result(s) of each unique test*;   ?Ordering of each unique test*;   ?Assessment requiring an independent historian(s)    or   Category 2: Independent interpretation of tests   ? Independent interpretation of a test performed by another physician/other qualified health care professional (not separately reported);     or  Category 3: Discussion of management or test interpretation  ? Discussion of management or test interpretation with external physician/other qualified health care professional/appropriate source (not separately reported)   High risk of morbidity from additional diagnostic testing or treatment  Examples only:  ?Drug therapy requiring intensive monitoring for toxicity  ? Decision regarding elective major surgery with identified patient or procedure risk factors  ? Decision regarding emergency major surgery  ? Decision regarding hospitalization  ? Decision not to resuscitate or to de-escalate care because of poor prognosis             I have personally performed a face-to-face diagnostic evaluation and management  service on this patient. I have independently seen the patient. I have independently obtained the above history from the patient/family.     I have independently examined the patient with above findings. I have independently reviewed data/labs for this patient and developed the above plan of care (MDM).       Signed: Sharon Welch MD  10/28/2022    8:26 AM

## 2022-10-31 ENCOUNTER — OFFICE VISIT (OUTPATIENT)
Dept: SURGERY | Age: 77
End: 2022-10-31

## 2022-10-31 VITALS — OXYGEN SATURATION: 98 % | WEIGHT: 172 LBS | HEIGHT: 71 IN | HEART RATE: 75 BPM | BODY MASS INDEX: 24.08 KG/M2

## 2022-10-31 DIAGNOSIS — C43.71 MELANOMA OF RIGHT POSTERIOR CALF (HCC): Primary | ICD-10-CM

## 2022-10-31 PROCEDURE — 99024 POSTOP FOLLOW-UP VISIT: CPT | Performed by: SURGERY

## 2022-10-31 NOTE — PROGRESS NOTES
H&P/Consult Note/Progress Note/Office Note:   Madison Lennon  MRN: [id number]  ZLB:0[birth year]70  Age:77 y.o.    HPI: Madison Lennon is a 65 y.o. male who is s/p WLE with STSG of a cT3bN0, pT3bN1 right posterior calf melanoma and right inguinofemoral sent node excision on 10/18/22    He had a deep margin which was close but negative. We did remove the gastrocnemius fascia at the deep aspect of the incision  One of the 2 right inguinofemoral nodes was positive for metastasis. Prior to surgery he was referred by Dr. Isac Langley for evaluation of cT3bN0 malignant melanoma of the right calf. He had a shave biopsy on 8/24/22 by Dr. Molly Manriquez of the right distal calf which identified a malignant melanoma   Breslow 3.4 mm; IV; +ulceration, +regression; 20 mitosis/ sq mm; no microsatellitosis; no LVI    Lesion developed over several months and was getting larger in size. Nothing in particular made it better or worse. No associated weight loss. He takes a blood thinner but does not know the name.                 10/24/22 CXR (baseline): No nodules  10/24/22 LFTs (baseline): normal      Past Medical History:   Diagnosis Date    History of cardioversion 09/01/2022    converted then a fib returned 2 weeks    Hyperlipidemia     Hypertension     New onset a-fib ([facility] [address] 08/17/2022    cardioversion @ [facility] 9/1/22-Afib returned 2 weeks later- Eliquis & Metoprolol- [facility]     Past Surgical History:   Procedure Laterality Date    CARDIOVERSION  09/01/2022    & [facility]    CATARACT EXTRACTION      LEG BIOPSY EXCISION Right 10/18/2022    wide local excision right posterior calf melanoma with performed by Severo Knack, MD at [place] [id number] Right 10/18/2022    SENTINEL LYMPH NODE EXCISION RIGHT INGUINAL performed by Severo Knack, MD at [address] [place] [address] Right 10/18/2022    SPLIT THICKNESS SKIN GRAFT, RIGHT LEG performed by Severo Knack, MD at [facility] Current Outpatient Medications   Medication Sig    sulfamethoxazole-trimethoprim (BACTRIM) 400-80 MG per tablet Take 1 tablet by mouth daily for 10 days    losartan-hydroCHLOROthiazide (HYZAAR) 100-25 MG per tablet Take 1 tablet by mouth daily    apixaban (ELIQUIS) 5 MG TABS tablet Take 5 mg by mouth 2 times daily Hold 48 hours prior to surgery per medication hold [facility]    atorvastatin (LIPITOR) 20 MG tablet Take 20 mg by mouth every morning    cetirizine (ZYRTEC) 10 MG tablet Take 10 mg by mouth daily    metoprolol tartrate (LOPRESSOR) 25 MG tablet Take 25 mg by mouth 2 times daily    Multiple Vitamin (MULTIVITAMINS PO) Take 1 tablet by mouth daily    aspirin 81 MG EC tablet Take 81 mg by mouth daily     No current facility-administered medications for this visit. ALLERGIES:  Lisinopril    Social History     Socioeconomic History    Marital status:    Tobacco Use    Smoking status: Never    Smokeless tobacco: Never   Vaping Use    Vaping Use: Never used   Substance and Sexual Activity    Alcohol use: Not Currently    Drug use: Not Currently     Social History     Tobacco Use   Smoking Status Never   Smokeless Tobacco Never     Family History   Problem Relation Age of Onset    Cancer Mother      ROS: The patient has no difficulty with chest pain or shortness of breath. No fever or chills. Comprehensive review of systems was otherwise unremarkable except as noted above. Physical Exam:   Pulse 75   Ht 5' 11\" (1.803 m)   Wt 172 lb (78 kg)   SpO2 98%   BMI 23.99 kg/m²   Vitals:    10/31/22 1026   Pulse: 75   SpO2: 98%   Weight: 172 lb (78 kg)   Height: 5' 11\" (1.803 m)       @[facility]@  [unfilled]    Constitutional: Alert, oriented, cooperative patient in no acute distress; appears stated age    Eyes:Sclera are clear. EOMs intact  ENMT: no external lesions gross hearing normal; no obvious neck masses, no ear or lip lesions, nares normal  CV: RRR.  Normal perfusion  Resp: No JVD. Breathing is  non-labored; no audible wheezing. GI: soft and non-distended       Healing right inguinofemoral, right anterior thigh, and right calf incision sites   10/28/22 25cc seroma aspirated right groin  10/31/22 30cc seroma aspirated right groin     No satellite nodules  No regional adenopathy. Musculoskeletal: unremarkable with normal function. No embolic signs or cyanosis. Neuro:  Oriented; moves all 4; no focal deficits  Psychiatric: normal affect and mood, no memory impairment    Recent vitals (if inpt):  @IPVITALS(24:)@    Amount and/or Complexity of Data Reviewed and Analyzed:  I reviewed and analyzed all of the unique labs and radiologic studies that are shown below as well as any that are in the HPI, and any that are in the expanded problem list below  *Each unique test, order, or document contributes to the combination of 2 or combination of 3 in Category 1 below. For this visit I also reviewed old records and prior notes. No results for input(s): WBC, HGB, PLT, NA, K, CL, CO2, BUN, CREA, GLU, INR, APTT, ALT, AML, AML, LCAD, PCO2, PO2, HCO3 in the last 72 hours.     Invalid input(s): PTP, TBIL, TBILI, CBIL, SGOT, GPT, AP, LPSE, NH4, TROPT, TROIQ,  PH  Review of most recent CBC  Lab Results   Component Value Date    HGB 13.5 (L) 10/11/2022       Review of most recent BMP  Lab Results   Component Value Date/Time     10/24/2022 02:51 PM    K 4.3 10/24/2022 02:51 PM     10/24/2022 02:51 PM    CO2 29 10/24/2022 02:51 PM    BUN 20 10/24/2022 02:51 PM    CREATININE 1.00 10/24/2022 02:51 PM    GLUCOSE 93 10/24/2022 02:51 PM    CALCIUM 9.9 10/24/2022 02:51 PM        Review of most recent LFTs (and lipase if done)  Lab Results   Component Value Date    ALT 37 10/24/2022    AST 25 10/24/2022    ALKPHOS 103 10/24/2022    BILITOT 0.4 10/24/2022     No results found for: LIPASE    No results found for: INR, APTT, LCAD    Review of most recent HgbA1c  No results found for: LABA1C  No results found for: EAG    Nutritional assessment screen for wound healing issues:  Lab Results   Component Value Date    LABALBU 4.1 10/24/2022       @lastcovr@    Xray Result (most recent):  XR CHEST STANDARD TWO VW 10/24/2022    Narrative  EXAMINATION: XR CHEST (2 VW) 10/24/2022 2:40 PM    ACCESSION NUMBER: [id number]    COMPARISON: None available    INDICATION: Malignant melanoma of right lower limb, including hip    TECHNIQUE: PA and lateral views of the chest were obtained. FINDINGS:  Support Devices:  *  None    Cardiac Silhouette: Within normal limits in size. Mediastinum: Normal mediastinal contours. Lungs: No airspace consolidation. No pneumothorax or sizable pleural effusion. Upper Abdomen: Normal    Miscellaneous: No fracture or suspicious osseous lesion. Impression  No acute cardiopulmonary abnormality. CT Result (most recent):  No results found for this or any previous visit from the past 3650 days. US Result (most recent):  No results found for this or any previous visit from the past 3650 days. Admission date (for inpatients): (Not on file)   * No surgery found *  * No surgery found *        ASSESSMENT/PLAN:  [unfilled]  Active Problems:    * No active hospital problems. *  Resolved Problems:    * No resolved hospital problems. *     Patient Active Problem List    Diagnosis Date Noted    Melanoma of right posterior calf ([facility] [address] 09/21/2022     Priority: Medium     10/18/22 s/p WLE with STSG right posterior calf melanoma with right inguinofemoral sent node excision; Dr Surendra Yarbrough, pT3bN1  DIAGNOSIS   A:  \"RIGHT INGUINOFEMORAL SENTINEL NODE 2\":  METASTATIC MELANOMA PRESENT. B:  \"WIDE LOCAL EXCISION RIGHT POSTERIOR CALF\":  MALIGNANT MELANOMA   CONFINED TO DERMIS AND SUBCUTANEOUS TISSUE, 3.9 MM IN DEPTH, DELORES'S LEVEL   5, TUMOR WITHIN LESS THAN 1 MM OF THE DEEP MARGIN, GREATER THAN 5 MM FROM   ALL PERIPHERAL MARGINS.  SEPARATE FOCUS OF MELANOMA IN SITU AT AREA MARKED   BY SUTURE, MARGINS UNINVOLVED. C:  \"RIGHT INGUINOFEMORAL SENTINEL NODE 1\":  BENIGN LYMPH NODE, NEGATIVE FOR TUMOR. Sign Out Date: 10/20/2022  John Lopez MD     10/24/22 CXR, 2 views (baseline)  Cardiac Silhouette: Within normal limits in size. Mediastinum: Normal mediastinal contours. Lungs: No airspace consolidation. No pneumothorax or sizable pleural effusion. Upper Abdomen: Normal   Miscellaneous: No fracture or suspicious osseous lesion. IMPRESSION:  No acute cardiopulmonary abnormality. 10/24/22 LFTs (baseline): normal                Number and Complexity of Problems addressed and   Risks of complications and/or morbidity of management      pT3bN0 right posterior calf malignant melanoma  He is s/p WLE with STSG of a cT3bN0, pT3bN1 right posterior calf melanoma and right inguinofemoral sent node excision on 10/18/22    He had a deep margin which was close but negative. We did remove the gastrocnemius fascia at the deep aspect of the incision  One of the 2 right inguinofemoral nodes was positive for metastasis. I discussed the seriousness of his condition with he and his wife on 9/21/22 and 10/24/22  +seroma aspiration on 10/28/22 from right groin  I will see him in 3 days for another dressing change    He will likely need PET/CT imaging and oncology referral as well. He informs me he has recently been diagnosed with prostate carcinoma and is considering his options. Level of MDM (2/3 elements below)  Number and Complexity of Problems Addressed Amount and/or Complexity of Data to be Reviewed and Analyzed  *Each unique test, order, or document contributes to the combination of 2 or combination of 3 in Category 1 below.  Risk of Complications and/or Morbidity or Mortality of pt Management     [phone] SF Minimal  ?1self-limited or minor problem Minimal or none Minimal risk of morbidity from additional diagnostic testing or Rx   [id number] Low Low  ? 2or more self-limited or minor problems;    or  ? 1stable chronic illness;    or  ?1acute, uncomplicated illness or injury   Limited  (Must meet the requirements of at least 1 of the 2 categories)  Category 1: Tests and documents   ? Any combination of 2 from the following:  ?Review of prior external note(s) from each unique source*;  ?review of the result(s) of each unique test*;   ?ordering of each unique test*    or   Category 2: Assessment requiring an independent historian(s)  (For the categories of independent interpretation of tests and discussion of management or test interpretation, see moderate or high) Low risk of morbidity from additional diagnostic testing or treatment     [id number]  [id number] Mod Moderate  ? 1or more chronic illnesses with exacerbation, progression, or side effects of treatment;    or  ?2or more stable chronic illnesses;    or  ?1undiagnosed new problem with uncertain prognosis;    or  ?1acute illness with systemic symptoms;    or  ?1acute complicated injury   Moderate  (Must meet the requirements of at least 1 out of 3 categories)  Category 1: Tests, documents, or independent historian(s)  ? Any combination of 3 from the following:   ?Review of prior external note(s) from each unique source*;  ?Review of the result(s) of each unique test*;  ?Ordering of each unique test*;  ?Assessment requiring an independent historian(s)    or  Category 2: Independent interpretation of tests   ? Independent interpretation of a test performed by another physician/other qualified health care professional (not separately reported);     or  Category 3: Discussion of management or test interpretation  ? Discussion of management or test interpretation with external physician/other qualified health care professional/appropriate source (not separately reported)   Moderate risk of morbidity from additional diagnostic testing or treatment  Examples only:  ?Prescription drug management   ? Decision regarding minor findings. I have independently reviewed data/labs for this patient and developed the above plan of care (MDM).       Signed: Nam Pond MD  10/31/2022    10:28 AM

## 2022-11-04 ENCOUNTER — OFFICE VISIT (OUTPATIENT)
Dept: SURGERY | Age: 77
End: 2022-11-04

## 2022-11-04 DIAGNOSIS — C43.71 MELANOMA OF RIGHT POSTERIOR CALF (HCC): Primary | ICD-10-CM

## 2022-11-04 PROCEDURE — 99024 POSTOP FOLLOW-UP VISIT: CPT | Performed by: SURGERY

## 2022-11-04 NOTE — PROGRESS NOTES
H&P/Consult Note/Progress Note/Office Note:   Stew Aguila  MRN: [id number]  KPE:[birth year]  Age:77 y.o.    HPI: Stew Aguila is a 65 y.o. male who is s/p WLE with STSG of a cT3bN0, pT3bN1 right posterior calf melanoma and right inguinofemoral sent node excision on 10/18/22    He had a deep margin which was close but negative. We did remove the gastrocnemius fascia at the deep aspect of the incision  One of the 2 right inguinofemoral nodes was positive for metastasis. Prior to surgery he was referred by Dr. Roland Gray for evaluation of cT3bN0 malignant melanoma of the right calf. He had a shave biopsy on 8/24/22 by Dr. Iram Campa of the right distal calf which identified a malignant melanoma   Breslow 3.4 mm; IV; +ulceration, +regression; 20 mitosis/ sq mm; no microsatellitosis; no LVI    Lesion developed over several months and was getting larger in size. Nothing in particular made it better or worse. No associated weight loss. He takes a blood thinner but does not know the name.                 10/24/22 CXR (baseline): No nodules  10/24/22 LFTs (baseline): normal      Past Medical History:   Diagnosis Date    History of cardioversion 09/01/2022    converted then a fib returned 2 weeks    Hyperlipidemia     Hypertension     New onset a-fib ([facility] [address] 08/17/2022    cardioversion @ [facility] 9/1/22-Afib returned 2 weeks later- Eliquis & Metoprolol- [facility]     Past Surgical History:   Procedure Laterality Date    CARDIOVERSION  09/01/2022    & [facility]    CATARACT EXTRACTION      LEG BIOPSY EXCISION Right 10/18/2022    wide local excision right posterior calf melanoma with performed by Brooke Gibbons MD at [place] [address] Right 10/18/2022    SENTINEL LYMPH NODE EXCISION RIGHT INGUINAL performed by Brooke Gibbons MD at [address] [place] [address] Right 10/18/2022    SPLIT THICKNESS SKIN GRAFT, RIGHT LEG performed by Brooke Gibbons MD at [address] [place] [address] Current Outpatient Medications   Medication Sig    sulfamethoxazole-trimethoprim (BACTRIM) 400-80 MG per tablet Take 1 tablet by mouth daily for 10 days    losartan-hydroCHLOROthiazide (HYZAAR) 100-25 MG per tablet Take 1 tablet by mouth daily    apixaban (ELIQUIS) 5 MG TABS tablet Take 5 mg by mouth 2 times daily Hold 48 hours prior to surgery per medication hold clearance / [facility]    atorvastatin (LIPITOR) 20 MG tablet Take 20 mg by mouth every morning    cetirizine (ZYRTEC) 10 MG tablet Take 10 mg by mouth daily    metoprolol tartrate (LOPRESSOR) 25 MG tablet Take 25 mg by mouth 2 times daily    Multiple Vitamin (MULTIVITAMINS PO) Take 1 tablet by mouth daily    aspirin 81 MG EC tablet Take 81 mg by mouth daily     No current facility-administered medications for this visit. ALLERGIES:  Lisinopril    Social History     Socioeconomic History    Marital status:    Tobacco Use    Smoking status: Never    Smokeless tobacco: Never   Vaping Use    Vaping Use: Never used   Substance and Sexual Activity    Alcohol use: Not Currently    Drug use: Not Currently     Social History     Tobacco Use   Smoking Status Never   Smokeless Tobacco Never     Family History   Problem Relation Age of Onset    Cancer Mother      ROS: The patient has no difficulty with chest pain or shortness of breath. No fever or chills. Comprehensive review of systems was otherwise unremarkable except as noted above. Physical Exam:   There were no vitals taken for this visit. There were no vitals filed for this visit. [unfilled]  [unfilled]    Constitutional: Alert, oriented, cooperative patient in no acute distress; appears stated age    Eyes:Sclera are clear. EOMs intact  ENMT: no external lesions gross hearing normal; no obvious neck masses, no ear or lip lesions, nares normal  CV: RRR. Normal perfusion  Resp: No JVD. Breathing is  non-labored; no audible wheezing.     GI: soft and non-distended Healing right inguinofemoral, right anterior thigh, and right calf incision sites   10/28/22 25cc seroma aspirated right groin  10/31/22 30cc seroma aspirated right groin   11/4/22 40cc seroma evacuated right groin    No satellite nodules  No regional adenopathy. Musculoskeletal: unremarkable with normal function. No embolic signs or cyanosis. Neuro:  Oriented; moves all 4; no focal deficits  Psychiatric: normal affect and mood, no memory impairment    Recent vitals (if inpt):  @IPVITALS(24:)@    Amount and/or Complexity of Data Reviewed and Analyzed:  I reviewed and analyzed all of the unique labs and radiologic studies that are shown below as well as any that are in the HPI, and any that are in the expanded problem list below  *Each unique test, order, or document contributes to the combination of 2 or combination of 3 in Category 1 below. For this visit I also reviewed old records and prior notes. No results for input(s): WBC, HGB, PLT, NA, K, CL, CO2, BUN, CREA, GLU, INR, APTT, ALT, AML, AML, LCAD, PCO2, PO2, HCO3 in the last 72 hours.     Invalid input(s): PTP, TBIL, TBILI, CBIL, SGOT, GPT, AP, LPSE, NH4, TROPT, TROIQ,  PH  Review of most recent CBC  Lab Results   Component Value Date    HGB 13.5 (L) 10/11/2022       Review of most recent BMP  Lab Results   Component Value Date/Time     10/24/2022 02:51 PM    K 4.3 10/24/2022 02:51 PM     10/24/2022 02:51 PM    CO2 29 10/24/2022 02:51 PM    BUN 20 10/24/2022 02:51 PM    CREATININE 1.00 10/24/2022 02:51 PM    GLUCOSE 93 10/24/2022 02:51 PM    CALCIUM 9.9 10/24/2022 02:51 PM        Review of most recent LFTs (and lipase if done)  Lab Results   Component Value Date    ALT 37 10/24/2022    AST 25 10/24/2022    ALKPHOS 103 10/24/2022    BILITOT 0.4 10/24/2022     No results found for: LIPASE    No results found for: INR, APTT, LCAD    Review of most recent HgbA1c  No results found for: LABA1C  No results found for: EAG    Nutritional assessment screen for wound healing issues:  Lab Results   Component Value Date    LABALBU 4.1 10/24/2022       @lastcovr@    Xray Result (most recent):  XR CHEST STANDARD TWO VW 10/24/2022    Narrative  EXAMINATION: XR CHEST (2 VW) 10/24/2022 2:40 PM    ACCESSION NUMBER: [id number]    COMPARISON: None available    INDICATION: Malignant melanoma of right lower limb, including hip    TECHNIQUE: PA and lateral views of the chest were obtained. FINDINGS:  Support Devices:  *  None    Cardiac Silhouette: Within normal limits in size. Mediastinum: Normal mediastinal contours. Lungs: No airspace consolidation. No pneumothorax or sizable pleural effusion. Upper Abdomen: Normal    Miscellaneous: No fracture or suspicious osseous lesion. Impression  No acute cardiopulmonary abnormality. CT Result (most recent):  No results found for this or any previous visit from the past 3650 days. US Result (most recent):  No results found for this or any previous visit from the past 3650 days. Admission date (for inpatients): (Not on file)   * No surgery found *  * No surgery found *        ASSESSMENT/PLAN:  [unfilled]  Active Problems:    * No active hospital problems. *  Resolved Problems:    * No resolved hospital problems. *     Patient Active Problem List    Diagnosis Date Noted    Melanoma of right posterior calf ([facility] [address] 09/21/2022     Priority: Medium     10/18/22 s/p WLE with STSG right posterior calf melanoma with right inguinofemoral sent node excision; Dr Brooklyn Khan, pT3bN1  DIAGNOSIS   A:  \"RIGHT INGUINOFEMORAL SENTINEL NODE 2\":  METASTATIC MELANOMA PRESENT. B:  \"WIDE LOCAL EXCISION RIGHT POSTERIOR CALF\":  MALIGNANT MELANOMA   CONFINED TO DERMIS AND SUBCUTANEOUS TISSUE, 3.9 MM IN DEPTH, DELORES'S LEVEL   5, TUMOR WITHIN LESS THAN 1 MM OF THE DEEP MARGIN, GREATER THAN 5 MM FROM   ALL PERIPHERAL MARGINS. SEPARATE FOCUS OF MELANOMA IN SITU AT AREA MARKED   BY SUTURE, MARGINS UNINVOLVED.      C:  \"RIGHT INGUINOFEMORAL SENTINEL NODE 1\":  BENIGN LYMPH NODE, NEGATIVE FOR TUMOR. Sign Out Date: 10/20/2022  John Caban MD     10/24/22 CXR, 2 views (baseline)  Cardiac Silhouette: Within normal limits in size. Mediastinum: Normal mediastinal contours. Lungs: No airspace consolidation. No pneumothorax or sizable pleural effusion. Upper Abdomen: Normal   Miscellaneous: No fracture or suspicious osseous lesion. IMPRESSION:  No acute cardiopulmonary abnormality. 10/24/22 LFTs (baseline): normal                Number and Complexity of Problems addressed and   Risks of complications and/or morbidity of management      pT3bN0 right posterior calf malignant melanoma  He is s/p WLE with STSG of a cT3bN0, pT3bN1 right posterior calf melanoma and right inguinofemoral sent node excision on 10/18/22    He had a deep margin which was close but negative. We did remove the gastrocnemius fascia at the deep aspect of the incision  One of the 2 right inguinofemoral nodes was positive for metastasis. +seroma aspiration on 10/28/22, 10/31/22, 11/44/22 from right groin  Follow-up with me in 5 days on Wednesday    He will likely need PET/CT imaging and oncology referral as well once healed better. He informs me he has recently been diagnosed with prostate carcinoma and is considering his options. Level of MDM (2/3 elements below)  Number and Complexity of Problems Addressed Amount and/or Complexity of Data to be Reviewed and Analyzed  *Each unique test, order, or document contributes to the combination of 2 or combination of 3 in Category 1 below. Risk of Complications and/or Morbidity or Mortality of pt Management     [id number]  [id number] SF Minimal  ?1self-limited or minor problem Minimal or none Minimal risk of morbidity from additional diagnostic testing or Rx   [id number]  [id number] Low Low  ? 2or more self-limited or minor problems;    or  ? 1stable chronic illness;    or  ?1acute, uncomplicated illness or injury   Limited  (Must meet the requirements of at least 1 of the 2 categories)  Category 1: Tests and documents   ? Any combination of 2 from the following:  ?Review of prior external note(s) from each unique source*;  ?review of the result(s) of each unique test*;   ?ordering of each unique test*    or   Category 2: Assessment requiring an independent historian(s)  (For the categories of independent interpretation of tests and discussion of management or test interpretation, see moderate or high) Low risk of morbidity from additional diagnostic testing or treatment     90174  06487 Mod Moderate  ? 1or more chronic illnesses with exacerbation, progression, or side effects of treatment;    or  ?2or more stable chronic illnesses;    or  ?1undiagnosed new problem with uncertain prognosis;    or  ?1acute illness with systemic symptoms;    or  ?1acute complicated injury   Moderate  (Must meet the requirements of at least 1 out of 3 categories)  Category 1: Tests, documents, or independent historian(s)  ? Any combination of 3 from the following:   ?Review of prior external note(s) from each unique source*;  ?Review of the result(s) of each unique test*;  ?Ordering of each unique test*;  ?Assessment requiring an independent historian(s)    or  Category 2: Independent interpretation of tests   ? Independent interpretation of a test performed by another physician/other qualified health care professional (not separately reported);     or  Category 3: Discussion of management or test interpretation  ? Discussion of management or test interpretation with external physician/other qualified health care professional/appropriate source (not separately reported)   Moderate risk of morbidity from additional diagnostic testing or treatment  Examples only:  ?Prescription drug management   ? Decision regarding minor surgery with identified patient or procedure risk factors  ? Decision regarding elective major surgery without identified patient or procedure risk factors   ? Diagnosis or treatment significantly limited by social determinants of health       74396  71726 High High  ? 1or more chronic illnesses with severe exacerbation, progression, or side effects of treatment;    or  ?1 acute or chronic illness or injury that poses a threat to life or bodily function   Extensive  (Must meet the requirements of at least 2 out of 3 categories)  Category 1: Tests, documents, or independent historian(s)  ? Any combination of 3 from the following:   ?Review of prior external note(s) from each unique source*;  ?Review of the result(s) of each unique test*;   ?Ordering of each unique test*;   ?Assessment requiring an independent historian(s)    or   Category 2: Independent interpretation of tests   ? Independent interpretation of a test performed by another physician/other qualified health care professional (not separately reported);     or  Category 3: Discussion of management or test interpretation  ? Discussion of management or test interpretation with external physician/other qualified health care professional/appropriate source (not separately reported)   High risk of morbidity from additional diagnostic testing or treatment  Examples only:  ?Drug therapy requiring intensive monitoring for toxicity  ? Decision regarding elective major surgery with identified patient or procedure risk factors  ? Decision regarding emergency major surgery  ? Decision regarding hospitalization  ? Decision not to resuscitate or to de-escalate care because of poor prognosis             I have personally performed a face-to-face diagnostic evaluation and management  service on this patient. I have independently seen the patient. I have independently obtained the above history from the patient/family. I have independently examined the patient with above findings.   I have independently reviewed data/labs for this patient and developed the above plan of care (MDM).      Signed: Jordi Garcia MD  11/4/2022    11:59 AM

## 2022-11-09 ENCOUNTER — OFFICE VISIT (OUTPATIENT)
Dept: SURGERY | Age: 77
End: 2022-11-09

## 2022-11-09 DIAGNOSIS — C43.71 MELANOMA OF RIGHT POSTERIOR CALF (HCC): Primary | ICD-10-CM

## 2022-11-09 PROCEDURE — 99024 POSTOP FOLLOW-UP VISIT: CPT | Performed by: SURGERY

## 2022-11-09 NOTE — PROGRESS NOTES
H&P/Consult Note/Progress Note/Office Note:   Levi Callahan  MRN: [id number]    Age:77 y.o.    HPI: Levi Callahan is a 65 y.o. male who is s/p WLE with STSG of a cT3bN0, pT3bN1 right posterior calf melanoma and right inguinofemoral sent node excision on 10/18/22    He had a deep margin which was close but negative. We did remove the gastrocnemius fascia at the deep aspect of the incision  One of the 2 right inguinofemoral nodes was positive for metastasis. Prior to surgery he was referred by Dr. She Bui for evaluation of cT3bN0 malignant melanoma of the right calf. He had a shave biopsy on 22 by Dr. Tristan Lugo of the right distal calf which identified a malignant melanoma   Breslow 3.4 mm; IV; +ulceration, +regression; 20 mitosis/ sq mm; no microsatellitosis; no LVI    Lesion developed over several months and was getting larger in size. Nothing in particular made it better or worse. No associated weight loss. He takes a blood thinner but does not know the name.                 10/24/22 CXR (baseline): No nodules  10/24/22 LFTs (baseline): normal      Past Medical History:   Diagnosis Date    History of cardioversion 2022    converted then a fib returned 2 weeks    Hyperlipidemia     Hypertension     New onset a-fib ([facility] [address] 2022    cardioversion @ [facility] 22-Afib returned 2 weeks later- Eliquis & Metoprolol- [facility]     Past Surgical History:   Procedure Laterality Date    CARDIOVERSION  2022    & [facility]ICA    CATARACT EXTRACTION      LEG BIOPSY EXCISION Right 10/18/2022    wide local excision right posterior calf melanoma with performed by John Macias MD at [place] [address] Right 10/18/2022    SENTINEL LYMPH NODE EXCISION RIGHT INGUINAL performed by John Macias MD at [address] [place] [address] Right 10/18/2022    SPLIT THICKNESS SKIN GRAFT, RIGHT LEG performed by John Macias MD at [address] [place] [address] Current Outpatient Medications   Medication Sig    losartan-hydroCHLOROthiazide (HYZAAR) 100-25 MG per tablet Take 1 tablet by mouth daily    apixaban (ELIQUIS) 5 MG TABS tablet Take 5 mg by mouth 2 times daily Hold 48 hours prior to surgery per medication hold clearance / [facility]    atorvastatin (LIPITOR) 20 MG tablet Take 20 mg by mouth every morning    cetirizine (ZYRTEC) 10 MG tablet Take 10 mg by mouth daily    metoprolol tartrate (LOPRESSOR) 25 MG tablet Take 25 mg by mouth 2 times daily    Multiple Vitamin (MULTIVITAMINS PO) Take 1 tablet by mouth daily    aspirin 81 MG EC tablet Take 81 mg by mouth daily     No current facility-administered medications for this visit. ALLERGIES:  Lisinopril    Social History     Socioeconomic History    Marital status:    Tobacco Use    Smoking status: Never    Smokeless tobacco: Never   Vaping Use    Vaping Use: Never used   Substance and Sexual Activity    Alcohol use: Not Currently    Drug use: Not Currently     Social History     Tobacco Use   Smoking Status Never   Smokeless Tobacco Never     Family History   Problem Relation Age of Onset    Cancer Mother      ROS: The patient has no difficulty with chest pain or shortness of breath. No fever or chills. Comprehensive review of systems was otherwise unremarkable except as noted above. Physical Exam:   There were no vitals taken for this visit. There were no vitals filed for this visit. [unfilled]  [unfilled]    Constitutional: Alert, oriented, cooperative patient in no acute distress; appears stated age    Eyes:Sclera are clear. EOMs intact  ENMT: no external lesions gross hearing normal; no obvious neck masses, no ear or lip lesions, nares normal  CV: RRR. Normal perfusion  Resp: No JVD. Breathing is  non-labored; no audible wheezing.     GI: soft and non-distended       Healing right inguinofemoral, right anterior thigh, and right calf incision sites   10/28/22 25cc seroma aspirated right groin  10/31/22 30cc seroma aspirated right groin   11/4/22 40cc seroma evacuated right groin  11/9/22 30cc seroma evacuated right groin      No satellite nodules  No regional adenopathy. Musculoskeletal: unremarkable with normal function. No embolic signs or cyanosis. Neuro:  Oriented; moves all 4; no focal deficits  Psychiatric: normal affect and mood, no memory impairment    Recent vitals (if inpt):  @IPVITALS(24:)@    Amount and/or Complexity of Data Reviewed and Analyzed:  I reviewed and analyzed all of the unique labs and radiologic studies that are shown below as well as any that are in the HPI, and any that are in the expanded problem list below  *Each unique test, order, or document contributes to the combination of 2 or combination of 3 in Category 1 below. For this visit I also reviewed old records and prior notes. No results for input(s): WBC, HGB, PLT, NA, K, CL, CO2, BUN, CREA, GLU, INR, APTT, ALT, AML, AML, LCAD, PCO2, PO2, HCO3 in the last 72 hours.     Invalid input(s): PTP, TBIL, TBILI, CBIL, SGOT, GPT, AP, LPSE, NH4, TROPT, TROIQ,  PH  Review of most recent CBC  Lab Results   Component Value Date    HGB 13.5 (L) 10/11/2022       Review of most recent BMP  Lab Results   Component Value Date/Time     10/24/2022 02:51 PM    K 4.3 10/24/2022 02:51 PM     10/24/2022 02:51 PM    CO2 29 10/24/2022 02:51 PM    BUN 20 10/24/2022 02:51 PM    CREATININE 1.00 10/24/2022 02:51 PM    GLUCOSE 93 10/24/2022 02:51 PM    CALCIUM 9.9 10/24/2022 02:51 PM        Review of most recent LFTs (and lipase if done)  Lab Results   Component Value Date    ALT 37 10/24/2022    AST 25 10/24/2022    ALKPHOS 103 10/24/2022    BILITOT 0.4 10/24/2022     No results found for: LIPASE    No results found for: INR, APTT, LCAD    Review of most recent HgbA1c  No results found for: LABA1C  No results found for: EAG    Nutritional assessment screen for wound healing issues:  Lab Results   Component Value Date    LABALBU 4.1 10/24/2022       @lastcovr@    Xray Result (most recent):  XR CHEST STANDARD TWO VW 10/24/2022    Narrative  EXAMINATION: XR CHEST (2 VW) 10/24/2022 2:40 PM    ACCESSION NUMBER: [id number]    COMPARISON: None available    INDICATION: Malignant melanoma of right lower limb, including hip    TECHNIQUE: PA and lateral views of the chest were obtained. FINDINGS:  Support Devices:  *  None    Cardiac Silhouette: Within normal limits in size. Mediastinum: Normal mediastinal contours. Lungs: No airspace consolidation. No pneumothorax or sizable pleural effusion. Upper Abdomen: Normal    Miscellaneous: No fracture or suspicious osseous lesion. Impression  No acute cardiopulmonary abnormality. CT Result (most recent):  No results found for this or any previous visit from the past 3650 days. US Result (most recent):  No results found for this or any previous visit from the past 3650 days. Admission date (for inpatients): (Not on file)   * No surgery found *  * No surgery found *        ASSESSMENT/PLAN:  [unfilled]  Active Problems:    * No active hospital problems. *  Resolved Problems:    * No resolved hospital problems. *     Patient Active Problem List    Diagnosis Date Noted    Melanoma of right posterior calf ([address] 09/21/2022     Priority: Medium     10/18/22 s/p WLE with STSG right posterior calf melanoma with right inguinofemoral sent node excision; Dr Stella Barajas, pT3bN1  DIAGNOSIS   A:  \"RIGHT INGUINOFEMORAL SENTINEL NODE 2\":  METASTATIC MELANOMA PRESENT. B:  \"WIDE LOCAL EXCISION RIGHT POSTERIOR CALF\":  MALIGNANT MELANOMA   CONFINED TO DERMIS AND SUBCUTANEOUS TISSUE, 3.9 MM IN DEPTH, DELORES'S LEVEL   5, TUMOR WITHIN LESS THAN 1 MM OF THE DEEP MARGIN, GREATER THAN 5 MM FROM   ALL PERIPHERAL MARGINS. SEPARATE FOCUS OF MELANOMA IN SITU AT AREA MARKED   BY SUTURE, MARGINS UNINVOLVED. C:  \"RIGHT INGUINOFEMORAL SENTINEL NODE 1\":  BENIGN LYMPH NODE, NEGATIVE FOR TUMOR. Sign Out Date: 10/20/2022  John Araujo MD     10/24/22 CXR, 2 views (baseline)  Cardiac Silhouette: Within normal limits in size. Mediastinum: Normal mediastinal contours. Lungs: No airspace consolidation. No pneumothorax or sizable pleural effusion. Upper Abdomen: Normal   Miscellaneous: No fracture or suspicious osseous lesion. IMPRESSION:  No acute cardiopulmonary abnormality. 10/24/22 LFTs (baseline): normal                Number and Complexity of Problems addressed and   Risks of complications and/or morbidity of management      pT3bN0 right posterior calf malignant melanoma  He is s/p WLE with STSG of a cT3bN0, pT3bN1 right posterior calf melanoma and right inguinofemoral sent node excision on 10/18/22    He had a deep margin which was close but negative. We did remove the gastrocnemius fascia at the deep aspect of the incision  One of the 2 right inguinofemoral nodes was positive for metastasis. He will likely need PET/CT imaging and oncology referral later once healed better. +seroma aspiration on 10/28/22, 10/31/22, 11/4/22, 11/9/22 (30cc) from right groin  He will follow-up with Dr. Lila Cunha on 11/18/2022 for re-aspiration right groin seroma and then see Dr. Orestes Malave on Monday, 11/28/2022. He informs me he has recently been diagnosed with prostate carcinoma and is considering his options. Level of MDM (2/3 elements below)  Number and Complexity of Problems Addressed Amount and/or Complexity of Data to be Reviewed and Analyzed  *Each unique test, order, or document contributes to the combination of 2 or combination of 3 in Category 1 below. Risk of Complications and/or Morbidity or Mortality of pt Management     51205  09409 SF Minimal  ?1self-limited or minor problem Minimal or none Minimal risk of morbidity from additional diagnostic testing or Rx   28189  50636 Low Low  ? 2or more self-limited or minor problems;    or  ? 1stable chronic illness;    or  ?1acute, uncomplicated illness or injury   Limited  (Must meet the requirements of at least 1 of the 2 categories)  Category 1: Tests and documents   ? Any combination of 2 from the following:  ?Review of prior external note(s) from each unique source*;  ?review of the result(s) of each unique test*;   ?ordering of each unique test*    or   Category 2: Assessment requiring an independent historian(s)  (For the categories of independent interpretation of tests and discussion of management or test interpretation, see moderate or high) Low risk of morbidity from additional diagnostic testing or treatment     44573  57734 Mod Moderate  ? 1or more chronic illnesses with exacerbation, progression, or side effects of treatment;    or  ?2or more stable chronic illnesses;    or  ?1undiagnosed new problem with uncertain prognosis;    or  ?1acute illness with systemic symptoms;    or  ?1acute complicated injury   Moderate  (Must meet the requirements of at least 1 out of 3 categories)  Category 1: Tests, documents, or independent historian(s)  ? Any combination of 3 from the following:   ?Review of prior external note(s) from each unique source*;  ?Review of the result(s) of each unique test*;  ?Ordering of each unique test*;  ?Assessment requiring an independent historian(s)    or  Category 2: Independent interpretation of tests   ? Independent interpretation of a test performed by another physician/other qualified health care professional (not separately reported);     or  Category 3: Discussion of management or test interpretation  ? Discussion of management or test interpretation with external physician/other qualified health care professional/appropriate source (not separately reported)   Moderate risk of morbidity from additional diagnostic testing or treatment  Examples only:  ?Prescription drug management   ? Decision regarding minor surgery with identified patient or procedure risk factors  ? Decision regarding elective major surgery without identified patient or procedure risk factors   ? Diagnosis or treatment significantly limited by social determinants of health       81823  85553 High High  ? 1or more chronic illnesses with severe exacerbation, progression, or side effects of treatment;    or  ?1 acute or chronic illness or injury that poses a threat to life or bodily function   Extensive  (Must meet the requirements of at least 2 out of 3 categories)  Category 1: Tests, documents, or independent historian(s)  ? Any combination of 3 from the following:   ?Review of prior external note(s) from each unique source*;  ?Review of the result(s) of each unique test*;   ?Ordering of each unique test*;   ?Assessment requiring an independent historian(s)    or   Category 2: Independent interpretation of tests   ? Independent interpretation of a test performed by another physician/other qualified health care professional (not separately reported);     or  Category 3: Discussion of management or test interpretation  ? Discussion of management or test interpretation with external physician/other qualified health care professional/appropriate source (not separately reported)   High risk of morbidity from additional diagnostic testing or treatment  Examples only:  ?Drug therapy requiring intensive monitoring for toxicity  ? Decision regarding elective major surgery with identified patient or procedure risk factors  ? Decision regarding emergency major surgery  ? Decision regarding hospitalization  ? Decision not to resuscitate or to de-escalate care because of poor prognosis             I have personally performed a face-to-face diagnostic evaluation and management  service on this patient. I have independently seen the patient. I have independently obtained the above history from the patient/family. I have independently examined the patient with above findings.   I have independently reviewed data/labs for this patient and developed the above plan of care (MDM).       Signed: Kartik Gómez MD  11/9/2022    11:37 AM

## 2022-11-18 ENCOUNTER — OFFICE VISIT (OUTPATIENT)
Dept: SURGERY | Age: 77
End: 2022-11-18

## 2022-11-18 VITALS — WEIGHT: 172 LBS | HEIGHT: 71 IN | BODY MASS INDEX: 24.08 KG/M2

## 2022-11-18 DIAGNOSIS — C43.71 MELANOMA OF RIGHT POSTERIOR CALF (HCC): Primary | ICD-10-CM

## 2022-11-18 PROCEDURE — 99024 POSTOP FOLLOW-UP VISIT: CPT | Performed by: SURGERY

## 2022-11-28 ENCOUNTER — OFFICE VISIT (OUTPATIENT)
Dept: SURGERY | Age: 77
End: 2022-11-28

## 2022-11-28 DIAGNOSIS — C43.71 MELANOMA OF RIGHT POSTERIOR CALF (HCC): Primary | ICD-10-CM

## 2022-11-28 PROCEDURE — 99024 POSTOP FOLLOW-UP VISIT: CPT | Performed by: SURGERY

## 2022-11-28 NOTE — PROGRESS NOTES
H&P/Consult Note/Progress Note/Office Note:   Sima Bamberger  MRN: [id number]  JDU:4[birth year]  Age:77 y.o.    HPI: Sima Bamberger is a 65 y.o. male who is s/p WLE with STSG of a cT3bN0, pT3bN1 right posterior calf melanoma and right inguinofemoral sent node excision on 10/18/22    He had a deep margin which was close but negative. We did remove the gastrocnemius fascia at the deep aspect of the incision  One of the 2 right inguinofemoral nodes was positive for metastasis. Prior to surgery he was referred by Dr. Trever Barrera for evaluation of cT3bN0 malignant melanoma of the right calf. He had a shave biopsy on 8/24/22 by Dr. Lizeth Espino of the right distal calf which identified a malignant melanoma   Breslow 3.4 mm; IV; +ulceration, +regression; 20 mitosis/ sq mm; no microsatellitosis; no LVI    Lesion developed over several months and was getting larger in size. Nothing in particular made it better or worse. No associated weight loss. He takes a blood thinner but does not know the name.                 10/24/22 CXR (baseline): No nodules  10/24/22 LFTs (baseline): normal      Past Medical History:   Diagnosis Date    History of cardioversion 09/01/2022    converted then a fib returned 2 weeks    Hyperlipidemia     Hypertension     New onset a-fib ([facility] [address] 08/17/2022    cardioversion @ [facility] 9/1/22-Afib returned 2 weeks later- Eliquis & Metoprolol- [address] [facility]     Past Surgical History:   Procedure Laterality Date    CARDIOVERSION  09/01/2022    & [facility]    CATARACT EXTRACTION      LEG BIOPSY EXCISION Right 10/18/2022    wide local excision right posterior calf melanoma with performed by Carleen Solomon MD at [place] [address] Right 10/18/2022    SENTINEL LYMPH NODE EXCISION RIGHT INGUINAL performed by Carleen Solomon MD at [address] [place] [address] Right 10/18/2022    SPLIT THICKNESS SKIN GRAFT, RIGHT LEG performed by Carleen Solomon MD at [address] [place] [address] Current Outpatient Medications   Medication Sig    losartan-hydroCHLOROthiazide (HYZAAR) 100-25 MG per tablet Take 1 tablet by mouth daily    apixaban (ELIQUIS) 5 MG TABS tablet Take 5 mg by mouth 2 times daily Hold 48 hours prior to surgery per medication hold clearance / [facility]    atorvastatin (LIPITOR) 20 MG tablet Take 20 mg by mouth every morning    cetirizine (ZYRTEC) 10 MG tablet Take 10 mg by mouth daily    metoprolol tartrate (LOPRESSOR) 25 MG tablet Take 25 mg by mouth 2 times daily    Multiple Vitamin (MULTIVITAMINS PO) Take 1 tablet by mouth daily    aspirin 81 MG EC tablet Take 81 mg by mouth daily     No current facility-administered medications for this visit. ALLERGIES:  Lisinopril    Social History     Socioeconomic History    Marital status:    Tobacco Use    Smoking status: Never    Smokeless tobacco: Never   Vaping Use    Vaping Use: Never used   Substance and Sexual Activity    Alcohol use: Not Currently    Drug use: Not Currently     Social History     Tobacco Use   Smoking Status Never   Smokeless Tobacco Never     Family History   Problem Relation Age of Onset    Cancer Mother      ROS: The patient has no difficulty with chest pain or shortness of breath. No fever or chills. Comprehensive review of systems was otherwise unremarkable except as noted above. Physical Exam:   There were no vitals taken for this visit. There were no vitals filed for this visit. [unfilled]  [unfilled]    Constitutional: Alert, oriented, cooperative patient in no acute distress; appears stated age    Eyes:Sclera are clear. EOMs intact  ENMT: no external lesions gross hearing normal; no obvious neck masses, no ear or lip lesions, nares normal  CV: RRR. Normal perfusion  Resp: No JVD. Breathing is  non-labored; no audible wheezing.     GI: soft and non-distended       Healed right inguinofemoral, right anterior thigh, and right posterior calf incision sites   Septic small eschar 1.5 x 1 cm at apex of grafted site pf calf    10/28/22 25cc seroma aspirated right groin  10/31/22 30cc seroma aspirated right groin   11/4/22 40cc seroma evacuated right groin  11/9/22 30cc seroma evacuated right groin  11/28/22 no seroma      No satellite nodules  No regional adenopathy. Musculoskeletal: unremarkable with normal function. No embolic signs or cyanosis. Neuro:  Oriented; moves all 4; no focal deficits  Psychiatric: normal affect and mood, no memory impairment    Recent vitals (if inpt):  @IPVITALS(24:)@    Amount and/or Complexity of Data Reviewed and Analyzed:  I reviewed and analyzed all of the unique labs and radiologic studies that are shown below as well as any that are in the HPI, and any that are in the expanded problem list below  *Each unique test, order, or document contributes to the combination of 2 or combination of 3 in Category 1 below. For this visit I also reviewed old records and prior notes. No results for input(s): WBC, HGB, PLT, NA, K, CL, CO2, BUN, CREA, GLU, INR, APTT, ALT, AML, AML, LCAD, PCO2, PO2, HCO3 in the last 72 hours.     Invalid input(s): PTP, TBIL, TBILI, CBIL, SGOT, GPT, AP, LPSE, NH4, TROPT, TROIQ,  PH  Review of most recent CBC  Lab Results   Component Value Date    HGB 13.5 (L) 10/11/2022       Review of most recent BMP  Lab Results   Component Value Date/Time     10/24/2022 02:51 PM    K 4.3 10/24/2022 02:51 PM     10/24/2022 02:51 PM    CO2 29 10/24/2022 02:51 PM    BUN 20 10/24/2022 02:51 PM    CREATININE 1.00 10/24/2022 02:51 PM    GLUCOSE 93 10/24/2022 02:51 PM    CALCIUM 9.9 10/24/2022 02:51 PM        Review of most recent LFTs (and lipase if done)  Lab Results   Component Value Date    ALT 37 10/24/2022    AST 25 10/24/2022    ALKPHOS 103 10/24/2022    BILITOT 0.4 10/24/2022     No results found for: LIPASE    No results found for: INR, APTT, LCAD    Review of most recent HgbA1c  No results found for: LABA1C  No results found for: EAG    Nutritional assessment screen for wound healing issues:  Lab Results   Component Value Date    LABALBU 4.1 10/24/2022       @lastcovr@    Xray Result (most recent):  XR CHEST STANDARD TWO VW 10/24/2022    Narrative  EXAMINATION: XR CHEST (2 VW) 10/24/2022 2:40 PM    ACCESSION NUMBER: [id number]    COMPARISON: None available    INDICATION: Malignant melanoma of right lower limb, including hip    TECHNIQUE: PA and lateral views of the chest were obtained. FINDINGS:  Support Devices:  *  None    Cardiac Silhouette: Within normal limits in size. Mediastinum: Normal mediastinal contours. Lungs: No airspace consolidation. No pneumothorax or sizable pleural effusion. Upper Abdomen: Normal    Miscellaneous: No fracture or suspicious osseous lesion. Impression  No acute cardiopulmonary abnormality. CT Result (most recent):  No results found for this or any previous visit from the past 3650 days. US Result (most recent):  No results found for this or any previous visit from the past 3650 days. Admission date (for inpatients): (Not on file)   * No surgery found *  * No surgery found *        ASSESSMENT/PLAN:  [unfilled]  Active Problems:    * No active hospital problems. *  Resolved Problems:    * No resolved hospital problems. *     Patient Active Problem List    Diagnosis Date Noted    Melanoma of right posterior calf ([facility] [address] 09/21/2022     Priority: Medium     10/18/22 s/p WLE with STSG right posterior calf melanoma with right inguinofemoral sent node excision; Dr Azam Marley, pT3bN1  DIAGNOSIS   A:  \"RIGHT INGUINOFEMORAL SENTINEL NODE 2\":  METASTATIC MELANOMA PRESENT. B:  \"WIDE LOCAL EXCISION RIGHT POSTERIOR CALF\":  MALIGNANT MELANOMA   CONFINED TO DERMIS AND SUBCUTANEOUS TISSUE, 3.9 MM IN DEPTH, DELORES'S LEVEL   5, TUMOR WITHIN LESS THAN 1 MM OF THE DEEP MARGIN, GREATER THAN 5 MM FROM   ALL PERIPHERAL MARGINS.  SEPARATE FOCUS OF MELANOMA IN SITU AT AREA MARKED   BY SUTURE, MARGINS UNINVOLVED. C:  \"RIGHT INGUINOFEMORAL SENTINEL NODE 1\":  BENIGN LYMPH NODE, NEGATIVE FOR TUMOR. Sign Out Date: 10/20/2022  John Guzman MD     10/24/22 CXR, 2 views (baseline)  Cardiac Silhouette: Within normal limits in size. Mediastinum: Normal mediastinal contours. Lungs: No airspace consolidation. No pneumothorax or sizable pleural effusion. Upper Abdomen: Normal   Miscellaneous: No fracture or suspicious osseous lesion. IMPRESSION:  No acute cardiopulmonary abnormality. 10/24/22 LFTs (baseline): normal                Number and Complexity of Problems addressed and   Risks of complications and/or morbidity of management          pT3bN0 right posterior calf malignant melanoma  He is s/p WLE with STSG of a cT3bN0, pT3bN1 right posterior calf melanoma and right inguinofemoral sent node excision on 10/18/22    He had a deep margin which was close but negative. We did remove the gastrocnemius fascia at the deep aspect of the incision  One of the 2 right inguinofemoral nodes was positive for metastasis. He will likely need PET/CT imaging and oncology referral later once healed better. +seroma aspiration from right groin on 10/28/22, 10/31/22, 11/4/22, 11/9/22 (30cc) from right groin  This resolved by mid November    I ordered PET/CT imaging on 11/28/2022. I will see him weekly for dressing changes until he is completely healed and refer him to medical oncology at that time. He informs me he has recently been diagnosed with prostate carcinoma and is considering his options. Level of MDM (2/3 elements below)  Number and Complexity of Problems Addressed Amount and/or Complexity of Data to be Reviewed and Analyzed  *Each unique test, order, or document contributes to the combination of 2 or combination of 3 in Category 1 below.  Risk of Complications and/or Morbidity or Mortality of pt Management     [phone] SF Minimal  ?1self-limited or minor problem Minimal or none Minimal risk of morbidity from additional diagnostic testing or Rx   [id number]  [id number] Low Low  ? 2or more self-limited or minor problems;    or  ? 1stable chronic illness;    or  ?1acute, uncomplicated illness or injury   Limited  (Must meet the requirements of at least 1 of the 2 categories)  Category 1: Tests and documents   ? Any combination of 2 from the following:  ?Review of prior external note(s) from each unique source*;  ?review of the result(s) of each unique test*;   ?ordering of each unique test*    or   Category 2: Assessment requiring an independent historian(s)  (For the categories of independent interpretation of tests and discussion of management or test interpretation, see moderate or high) Low risk of morbidity from additional diagnostic testing or treatment     21345  [id number] Mod Moderate  ? 1or more chronic illnesses with exacerbation, progression, or side effects of treatment;    or  ?2or more stable chronic illnesses;    or  ?1undiagnosed new problem with uncertain prognosis;    or  ?1acute illness with systemic symptoms;    or  ?1acute complicated injury   Moderate  (Must meet the requirements of at least 1 out of 3 categories)  Category 1: Tests, documents, or independent historian(s)  ? Any combination of 3 from the following:   ?Review of prior external note(s) from each unique source*;  ?Review of the result(s) of each unique test*;  ?Ordering of each unique test*;  ?Assessment requiring an independent historian(s)    or  Category 2: Independent interpretation of tests   ? Independent interpretation of a test performed by another physician/other qualified health care professional (not separately reported);     or  Category 3: Discussion of management or test interpretation  ? Discussion of management or test interpretation with external physician/other qualified health care professional/appropriate source (not separately reported)   Moderate risk of morbidity from additional diagnostic testing or treatment  Examples only:  ?Prescription drug management   ? Decision regarding minor surgery with identified patient or procedure risk factors  ? Decision regarding elective major surgery without identified patient or procedure risk factors   ? Diagnosis or treatment significantly limited by social determinants of health       [id number] High High  ? 1or more chronic illnesses with severe exacerbation, progression, or side effects of treatment;    or  ?1 acute or chronic illness or injury that poses a threat to life or bodily function   Extensive  (Must meet the requirements of at least 2 out of 3 categories)  Category 1: Tests, documents, or independent historian(s)  ? Any combination of 3 from the following:   ?Review of prior external note(s) from each unique source*;  ?Review of the result(s) of each unique test*;   ?Ordering of each unique test*;   ?Assessment requiring an independent historian(s)    or   Category 2: Independent interpretation of tests   ? Independent interpretation of a test performed by another physician/other qualified health care professional (not separately reported);     or  Category 3: Discussion of management or test interpretation  ? Discussion of management or test interpretation with external physician/other qualified health care professional/appropriate source (not separately reported)   High risk of morbidity from additional diagnostic testing or treatment  Examples only:  ?Drug therapy requiring intensive monitoring for toxicity  ? Decision regarding elective major surgery with identified patient or procedure risk factors  ? Decision regarding emergency major surgery  ? Decision regarding hospitalization  ? Decision not to resuscitate or to de-escalate care because of poor prognosis             I have personally performed a face-to-face diagnostic evaluation and management  service on this patient. I have independently seen the patient.    I have independently obtained the above history from the patient/family. I have independently examined the patient with above findings. I have independently reviewed data/labs for this patient and developed the above plan of care (MDM).       Signed: Fawad Burk MD  11/28/2022    8:40 AM

## 2022-12-05 ENCOUNTER — OFFICE VISIT (OUTPATIENT)
Dept: SURGERY | Age: 77
End: 2022-12-05

## 2022-12-05 VITALS — HEIGHT: 71 IN | WEIGHT: 172 LBS | BODY MASS INDEX: 24.08 KG/M2

## 2022-12-05 DIAGNOSIS — C43.71 MELANOMA OF RIGHT POSTERIOR CALF (HCC): Primary | ICD-10-CM

## 2022-12-05 PROCEDURE — 99024 POSTOP FOLLOW-UP VISIT: CPT | Performed by: SURGERY

## 2022-12-05 NOTE — PROGRESS NOTES
H&P/Consult Note/Progress Note/Office Note:   Abelardo Mittal  MRN: [id number]  [id number]  Age:77 y.o.    HPI: Abelardo Patient is a 65 y.o. male who is s/p WLE with STSG of a cT3bN0, pT3bN1 right posterior calf melanoma and right inguinofemoral sent node excision on 10/18/22    He had a deep margin which was close but negative. We did remove the gastrocnemius fascia at the deep aspect of the incision  One of the 2 right inguinofemoral nodes was positive for metastasis. Prior to surgery he was referred by Dr. Shiraz Young for evaluation of cT3bN0 malignant melanoma of the right calf. He had a shave biopsy on 22 by Dr. Austin Lundberg of the right distal calf which identified a malignant melanoma   Breslow 3.4 mm; IV; +ulceration, +regression; 20 mitosis/ sq mm; no microsatellitosis; no LVI    Lesion developed over several months and was getting larger in size. Nothing in particular made it better or worse. No associated weight loss. He takes a blood thinner but does not know the name.                 10/24/22 CXR (baseline): No nodules  10/24/22 LFTs (baseline): normal              Past Medical History:   Diagnosis Date    History of cardioversion 2022    converted then a fib returned 2 weeks    Hyperlipidemia     Hypertension     New onset a-fib ([facility] [address] 2022    cardioversion @ [facility] 22-Afib returned 2 weeks later- Eliquis & Metoprolol- [address] [facility]     Past Surgical History:   Procedure Laterality Date    CARDIOVERSION  2022    & MONICA    CATARACT EXTRACTION      LEG BIOPSY EXCISION Right 10/18/2022    wide local excision right posterior calf melanoma with performed by Dereje Rosales MD at [place] [address] Right 10/18/2022    SENTINEL LYMPH NODE EXCISION RIGHT INGUINAL performed by Dereje Rosales MD at [address] [place] [address] Right 10/18/2022    SPLIT THICKNESS SKIN GRAFT, RIGHT LEG performed by Dereje Rosales MD at [facility] MAIN OR     Current Outpatient Medications   Medication Sig    losartan-hydroCHLOROthiazide (HYZAAR) 100-25 MG per tablet Take 1 tablet by mouth daily    apixaban (ELIQUIS) 5 MG TABS tablet Take 5 mg by mouth 2 times daily Hold 48 hours prior to surgery per medication hold clearance / [address] Cardiology    atorvastatin (LIPITOR) 20 MG tablet Take 20 mg by mouth every morning    cetirizine (ZYRTEC) 10 MG tablet Take 10 mg by mouth daily    metoprolol tartrate (LOPRESSOR) 25 MG tablet Take 25 mg by mouth 2 times daily    Multiple Vitamin (MULTIVITAMINS PO) Take 1 tablet by mouth daily    aspirin 81 MG EC tablet Take 81 mg by mouth daily     No current facility-administered medications for this visit. ALLERGIES:  Lisinopril    Social History     Socioeconomic History    Marital status:    Tobacco Use    Smoking status: Never    Smokeless tobacco: Never   Vaping Use    Vaping Use: Never used   Substance and Sexual Activity    Alcohol use: Not Currently    Drug use: Not Currently     Social History     Tobacco Use   Smoking Status Never   Smokeless Tobacco Never     Family History   Problem Relation Age of Onset    Cancer Mother      ROS: The patient has no difficulty with chest pain or shortness of breath. No fever or chills. Comprehensive review of systems was otherwise unremarkable except as noted above. Physical Exam:   There were no vitals taken for this visit. There were no vitals filed for this visit. [unfilled]  [unfilled]    Constitutional: Alert, oriented, cooperative patient in no acute distress; appears stated age    Eyes:Sclera are clear. EOMs intact  ENMT: no external lesions gross hearing normal; no obvious neck masses, no ear or lip lesions, nares normal  CV: RRR. Normal perfusion  Resp: No JVD. Breathing is  non-labored; no audible wheezing.     GI: soft and non-distended       Healed right inguinofemoral, right anterior thigh, and right posterior calf incision sites   Septic small eschar 1.5 x 1 cm at apex of grafted site pf calf    10/28/22 25cc seroma aspirated right groin  10/31/22 30cc seroma aspirated right groin   11/4/22 40cc seroma evacuated right groin  11/9/22 30cc seroma evacuated right groin  11/28/22 seroma resolved    No satellite nodules  No regional adenopathy. Musculoskeletal: unremarkable with normal function. No embolic signs or cyanosis. Neuro:  Oriented; moves all 4; no focal deficits  Psychiatric: normal affect and mood, no memory impairment    Recent vitals (if inpt):  @IPVITALS(24:)@    Amount and/or Complexity of Data Reviewed and Analyzed:  I reviewed and analyzed all of the unique labs and radiologic studies that are shown below as well as any that are in the HPI, and any that are in the expanded problem list below  *Each unique test, order, or document contributes to the combination of 2 or combination of 3 in Category 1 below. For this visit I also reviewed old records and prior notes. No results for input(s): WBC, HGB, PLT, NA, K, CL, CO2, BUN, CREA, GLU, INR, APTT, ALT, AML, AML, LCAD, PCO2, PO2, HCO3 in the last 72 hours.     Invalid input(s): PTP, TBIL, TBILI, CBIL, SGOT, GPT, AP, LPSE, NH4, TROPT, TROIQ,  PH  Review of most recent CBC  Lab Results   Component Value Date    HGB 13.5 (L) 10/11/2022       Review of most recent BMP  Lab Results   Component Value Date/Time     10/24/2022 02:51 PM    K 4.3 10/24/2022 02:51 PM     10/24/2022 02:51 PM    CO2 29 10/24/2022 02:51 PM    BUN 20 10/24/2022 02:51 PM    CREATININE 1.00 10/24/2022 02:51 PM    GLUCOSE 93 10/24/2022 02:51 PM    CALCIUM 9.9 10/24/2022 02:51 PM        Review of most recent LFTs (and lipase if done)  Lab Results   Component Value Date    ALT 37 10/24/2022    AST 25 10/24/2022    ALKPHOS 103 10/24/2022    BILITOT 0.4 10/24/2022     No results found for: LIPASE    No results found for: INR, APTT, LCAD    Review of most recent HgbA1c  No results found for: LABA1C  No MARGINS UNINVOLVED. C:  \"RIGHT INGUINOFEMORAL SENTINEL NODE 1\":  BENIGN LYMPH NODE, NEGATIVE FOR TUMOR. Sign Out Date: 10/20/2022  John Montero MD     10/24/22 CXR, 2 views (baseline)  Cardiac Silhouette: Within normal limits in size. Mediastinum: Normal mediastinal contours. Lungs: No airspace consolidation. No pneumothorax or sizable pleural effusion. Upper Abdomen: Normal   Miscellaneous: No fracture or suspicious osseous lesion. IMPRESSION:  No acute cardiopulmonary abnormality. 10/24/22 LFTs (baseline): normal                Number and Complexity of Problems addressed and   Risks of complications and/or morbidity of management          pT3bN0 right posterior calf malignant melanoma  He is s/p WLE with STSG of a cT3bN0, pT3bN1 right posterior calf melanoma and right inguinofemoral sent node excision on 10/18/22    He had a deep margin which was close but negative. We did remove the gastrocnemius fascia at the deep aspect of the incision  One of the 2 right inguinofemoral nodes was positive for metastasis. PET/CT scheduled 12/7/22    His right groin and right thigh incisions have healed and will be left open to air. The posterior calf wound will be changed every 1 to 2 days with dry gauze and Kerlix Coban or Ace wrap  He will see my nurse in 2 weeks for wound check and then see me 2 weeks after that. Will refer him to medical oncology at that time if he has healed. He informs me he has recently been diagnosed with prostate carcinoma and is considering his options. Level of MDM (2/3 elements below)  Number and Complexity of Problems Addressed Amount and/or Complexity of Data to be Reviewed and Analyzed  *Each unique test, order, or document contributes to the combination of 2 or combination of 3 in Category 1 below.  Risk of Complications and/or Morbidity or Mortality of pt Management     [phone] SF Minimal  ?1self-limited or minor problem Minimal or none Minimal risk of morbidity from additional diagnostic testing or Rx   [id number]  16338 Low Low  ? 2or more self-limited or minor problems;    or  ? 1stable chronic illness;    or  ?1acute, uncomplicated illness or injury   Limited  (Must meet the requirements of at least 1 of the 2 categories)  Category 1: Tests and documents   ? Any combination of 2 from the following:  ?Review of prior external note(s) from each unique source*;  ?review of the result(s) of each unique test*;   ?ordering of each unique test*    or   Category 2: Assessment requiring an independent historian(s)  (For the categories of independent interpretation of tests and discussion of management or test interpretation, see moderate or high) Low risk of morbidity from additional diagnostic testing or treatment     [id number]  71842 Mod Moderate  ? 1or more chronic illnesses with exacerbation, progression, or side effects of treatment;    or  ?2or more stable chronic illnesses;    or  ?1undiagnosed new problem with uncertain prognosis;    or  ?1acute illness with systemic symptoms;    or  ?1acute complicated injury   Moderate  (Must meet the requirements of at least 1 out of 3 categories)  Category 1: Tests, documents, or independent historian(s)  ? Any combination of 3 from the following:   ?Review of prior external note(s) from each unique source*;  ?Review of the result(s) of each unique test*;  ?Ordering of each unique test*;  ?Assessment requiring an independent historian(s)    or  Category 2: Independent interpretation of tests   ? Independent interpretation of a test performed by another physician/other qualified health care professional (not separately reported);     or  Category 3: Discussion of management or test interpretation  ? Discussion of management or test interpretation with external physician/other qualified health care professional/appropriate source (not separately reported)   Moderate risk of morbidity from additional diagnostic testing or treatment  Examples only:  ?Prescription drug management   ? Decision regarding minor surgery with identified patient or procedure risk factors  ? Decision regarding elective major surgery without identified patient or procedure risk factors   ? Diagnosis or treatment significantly limited by social determinants of health       46755  31791 High High  ? 1or more chronic illnesses with severe exacerbation, progression, or side effects of treatment;    or  ?1 acute or chronic illness or injury that poses a threat to life or bodily function   Extensive  (Must meet the requirements of at least 2 out of 3 categories)  Category 1: Tests, documents, or independent historian(s)  ? Any combination of 3 from the following:   ?Review of prior external note(s) from each unique source*;  ?Review of the result(s) of each unique test*;   ?Ordering of each unique test*;   ?Assessment requiring an independent historian(s)    or   Category 2: Independent interpretation of tests   ? Independent interpretation of a test performed by another physician/other qualified health care professional (not separately reported);     or  Category 3: Discussion of management or test interpretation  ? Discussion of management or test interpretation with external physician/other qualified health care professional/appropriate source (not separately reported)   High risk of morbidity from additional diagnostic testing or treatment  Examples only:  ?Drug therapy requiring intensive monitoring for toxicity  ? Decision regarding elective major surgery with identified patient or procedure risk factors  ? Decision regarding emergency major surgery  ? Decision regarding hospitalization  ? Decision not to resuscitate or to de-escalate care because of poor prognosis             I have personally performed a face-to-face diagnostic evaluation and management  service on this patient. I have independently seen the patient.    I have independently obtained the above history from the patient/family. I have independently examined the patient with above findings. I have independently reviewed data/labs for this patient and developed the above plan of care (MDM).       Signed: David Lazcano MD  12/5/2022    11:23 AM

## 2022-12-07 ENCOUNTER — HOSPITAL ENCOUNTER (OUTPATIENT)
Dept: PET IMAGING | Age: 77
Discharge: HOME | End: 2022-12-10
Payer: MEDICARE

## 2022-12-07 DIAGNOSIS — C43.71 MELANOMA OF RIGHT POSTERIOR CALF (HCC): ICD-10-CM

## 2022-12-07 LAB
GLUCOSE BLD STRIP.AUTO-MCNC: 90 MG/DL (ref 65–100)
SERVICE CMNT-IMP: NORMAL

## 2022-12-07 PROCEDURE — 78816 PET IMAGE W/CT FULL BODY: CPT

## 2022-12-07 PROCEDURE — A9552 F18 FDG: HCPCS | Performed by: SURGERY

## 2022-12-07 PROCEDURE — 2580000003 HC RX 258: Performed by: SURGERY

## 2022-12-07 PROCEDURE — 3430000000 HC RX DIAGNOSTIC RADIOPHARMACEUTICAL: Performed by: SURGERY

## 2022-12-07 PROCEDURE — 82962 GLUCOSE BLOOD TEST: CPT

## 2022-12-07 PROCEDURE — 6360000004 HC RX CONTRAST MEDICATION: Performed by: SURGERY

## 2022-12-07 RX ORDER — SODIUM CHLORIDE 0.9 % (FLUSH) 0.9 %
20 SYRINGE (ML) INJECTION AS NEEDED
Status: DISCONTINUED | OUTPATIENT
Start: 2022-12-07 | End: 2022-12-11 | Stop reason: HOSPADM

## 2022-12-07 RX ORDER — FLUDEOXYGLUCOSE F 18 200 MCI/ML
10 INJECTION, SOLUTION INTRAVENOUS
Status: COMPLETED | OUTPATIENT
Start: 2022-12-07 | End: 2022-12-07

## 2022-12-07 RX ADMIN — DIATRIZOATE MEGLUMINE AND DIATRIZOATE SODIUM 15 ML: 660; 100 LIQUID ORAL; RECTAL at 12:45

## 2022-12-07 RX ADMIN — SODIUM CHLORIDE, PRESERVATIVE FREE 20 ML: 5 INJECTION INTRAVENOUS at 12:30

## 2022-12-07 RX ADMIN — FLUDEOXYGLUCOSE F 18 14.7 MILLICURIE: 200 INJECTION, SOLUTION INTRAVENOUS at 12:30

## 2022-12-19 ENCOUNTER — OFFICE VISIT (OUTPATIENT)
Dept: SURGERY | Age: 77
End: 2022-12-19

## 2022-12-19 VITALS — HEIGHT: 71 IN | BODY MASS INDEX: 24.08 KG/M2 | WEIGHT: 172 LBS

## 2022-12-19 DIAGNOSIS — C43.71 MELANOMA OF RIGHT POSTERIOR CALF (HCC): Primary | ICD-10-CM

## 2022-12-19 NOTE — PROGRESS NOTES
Removed dressing. Applied desitin around the wound to protect the skin from drainage. Applied 4x4 and secured it with a coban. Return as appointed for Dr Janes Belcher.

## 2022-12-28 ENCOUNTER — OFFICE VISIT (OUTPATIENT)
Dept: SURGERY | Age: 77
End: 2022-12-28

## 2022-12-28 VITALS — BODY MASS INDEX: 24.08 KG/M2 | HEIGHT: 71 IN | WEIGHT: 172 LBS

## 2022-12-28 DIAGNOSIS — C43.71 MELANOMA OF RIGHT POSTERIOR CALF (HCC): Primary | ICD-10-CM

## 2022-12-28 PROCEDURE — 99024 POSTOP FOLLOW-UP VISIT: CPT | Performed by: SURGERY

## 2022-12-28 NOTE — PROGRESS NOTES
consistent with postsurgical changes related to recent melanoma resection. 2. Status post right inguinal lymph node dissection. Indeterminate dermal-based nodular focus more laterally in the anterior proximal thigh subcutaneous tissues with mild elevation of FDG uptake. 3. No evidence of metastatic disease in the head, neck, or chest.   4. Focus of asymmetric elevated uptake in the right prostate. Recommend correlating with PSA and consider prostate MRI.                Past Medical History:   Diagnosis Date    History of cardioversion 09/01/2022    converted then a fib returned 2 weeks    Hyperlipidemia     Hypertension     New onset a-fib ([address] 08/17/2022    cardioversion @ [facility] 9/1/22-Afib returned 2 weeks later- Eliquis & Metoprolol- [facility]     Past Surgical History:   Procedure Laterality Date    CARDIOVERSION  09/01/2022    & [facility]    CATARACT EXTRACTION      LEG BIOPSY EXCISION Right 10/18/2022    wide local excision right posterior calf melanoma with performed by Kiran Lockhart MD at [place] [address] Right 10/18/2022    SENTINEL LYMPH NODE EXCISION RIGHT INGUINAL performed by Kiran Lockhart MD at [address] [place] [address] Right 10/18/2022    SPLIT THICKNESS SKIN GRAFT, RIGHT LEG performed by Kiran Lockhart MD at [facility] MAIN OR     Current Outpatient Medications   Medication Sig    losartan-hydroCHLOROthiazide (HYZAAR) 100-25 MG per tablet Take 1 tablet by mouth daily    apixaban (ELIQUIS) 5 MG TABS tablet Take 5 mg by mouth 2 times daily Hold 48 hours prior to surgery per medication hold clearance / [facility]    atorvastatin (LIPITOR) 20 MG tablet Take 20 mg by mouth every morning    cetirizine (ZYRTEC) 10 MG tablet Take 10 mg by mouth daily    metoprolol tartrate (LOPRESSOR) 25 MG tablet Take 25 mg by mouth 2 times daily    Multiple Vitamin (MULTIVITAMINS PO) Take 1 tablet by mouth daily    aspirin 81 MG EC tablet Take 81 mg by mouth daily     No current facility-administered medications for this visit. ALLERGIES:  Lisinopril    Social History     Socioeconomic History    Marital status:    Tobacco Use    Smoking status: Never    Smokeless tobacco: Never   Vaping Use    Vaping Use: Never used   Substance and Sexual Activity    Alcohol use: Not Currently    Drug use: Not Currently     Social History     Tobacco Use   Smoking Status Never   Smokeless Tobacco Never     Family History   Problem Relation Age of Onset    Cancer Mother      ROS: The patient has no difficulty with chest pain or shortness of breath. No fever or chills. Comprehensive review of systems was otherwise unremarkable except as noted above. Physical Exam:   There were no vitals taken for this visit. There were no vitals filed for this visit. [unfilled]  [unfilled]    Constitutional: Alert, oriented, cooperative patient in no acute distress; appears stated age    Eyes:Sclera are clear. EOMs intact  ENMT: no external lesions gross hearing normal; no obvious neck masses, no ear or lip lesions, nares normal  CV: RRR. Normal perfusion  Resp: No JVD. Breathing is  non-labored; no audible wheezing. GI: soft and non-distended       Healed right inguinofemoral, right anterior thigh, and right posterior calf incision sites   small eschar 10x8mm at apex of grafted site of calf      10/28/22 25cc seroma aspirated right groin  10/31/22 30cc seroma aspirated right groin   11/4/22 40cc seroma evacuated right groin  11/9/22 30cc seroma evacuated right groin  11/28/22 seroma resolved    No satellite nodules  No regional adenopathy. No nodules in anterior or anterolateral thighs to correlate with PET CT    Musculoskeletal: unremarkable with normal function. No embolic signs or cyanosis.    Neuro:  Oriented; moves all 4; no focal deficits  Psychiatric: normal affect and mood, no memory impairment    Recent vitals (if inpt):  @IPVITALS(24:)@    Amount and/or Complexity of Data Reviewed and Analyzed:  I reviewed and analyzed all of the unique labs and radiologic studies that are shown below as well as any that are in the HPI, and any that are in the expanded problem list below  *Each unique test, order, or document contributes to the combination of 2 or combination of 3 in Category 1 below. For this visit I also reviewed old records and prior notes. No results for input(s): WBC, HGB, PLT, NA, K, CL, CO2, BUN, CREA, GLU, INR, APTT, ALT, AML, AML, LCAD, PCO2, PO2, HCO3 in the last 72 hours. Invalid input(s): PTP, TBIL, TBILI, CBIL, SGOT, GPT, AP, LPSE, NH4, TROPT, TROIQ,  PH  Review of most recent CBC  Lab Results   Component Value Date    HGB 13.5 (L) 10/11/2022       Review of most recent BMP  Lab Results   Component Value Date/Time     10/24/2022 02:51 PM    K 4.3 10/24/2022 02:51 PM     10/24/2022 02:51 PM    CO2 29 10/24/2022 02:51 PM    BUN 20 10/24/2022 02:51 PM    CREATININE 1.00 10/24/2022 02:51 PM    GLUCOSE 93 10/24/2022 02:51 PM    CALCIUM 9.9 10/24/2022 02:51 PM        Review of most recent LFTs (and lipase if done)  Lab Results   Component Value Date    ALT 37 10/24/2022    AST 25 10/24/2022    ALKPHOS 103 10/24/2022    BILITOT 0.4 10/24/2022     No results found for: LIPASE    No results found for: INR, APTT, LCAD    Review of most recent HgbA1c  No results found for: LABA1C  No results found for: EAG    Nutritional assessment screen for wound healing issues:  Lab Results   Component Value Date    LABALBU 4.1 10/24/2022       @lastcovr@    Xray Result (most recent):  XR CHEST STANDARD TWO VW 10/24/2022    Narrative  EXAMINATION: XR CHEST (2 VW) 10/24/2022 2:40 PM    ACCESSION NUMBER: [id number]    COMPARISON: None available    INDICATION: Malignant melanoma of right lower limb, including hip    TECHNIQUE: PA and lateral views of the chest were obtained. FINDINGS:  Support Devices:  *  None    Cardiac Silhouette:  Within normal limits in size. Mediastinum: Normal mediastinal contours. Lungs: No airspace consolidation. No pneumothorax or sizable pleural effusion. Upper Abdomen: Normal    Miscellaneous: No fracture or suspicious osseous lesion. Impression  No acute cardiopulmonary abnormality. CT Result (most recent):  No results found for this or any previous visit from the past 3650 days. US Result (most recent):  No results found for this or any previous visit from the past 3650 days. Admission date (for inpatients): (Not on file)   * No surgery found *  * No surgery found *        ASSESSMENT/PLAN:  [unfilled]  Active Problems:    * No active hospital problems. *  Resolved Problems:    * No resolved hospital problems. *     Patient Active Problem List    Diagnosis Date Noted    Melanoma of right posterior calf ([facility] [address] 09/21/2022     Priority: Medium     10/18/22 s/p WLE with STSG right posterior calf melanoma with right inguinofemoral sent node excision; Dr Jimmy Garcia, pT3bN1  DIAGNOSIS   A:  \"RIGHT INGUINOFEMORAL SENTINEL NODE 2\":  METASTATIC MELANOMA PRESENT. B:  \"WIDE LOCAL EXCISION RIGHT POSTERIOR CALF\":  MALIGNANT MELANOMA   CONFINED TO DERMIS AND SUBCUTANEOUS TISSUE, 3.9 MM IN DEPTH, DELORES'S LEVEL   5, TUMOR WITHIN LESS THAN 1 MM OF THE DEEP MARGIN, GREATER THAN 5 MM FROM   ALL PERIPHERAL MARGINS. SEPARATE FOCUS OF MELANOMA IN SITU AT AREA MARKED   BY SUTURE, MARGINS UNINVOLVED. C:  \"RIGHT INGUINOFEMORAL SENTINEL NODE 1\":  BENIGN LYMPH NODE, NEGATIVE FOR TUMOR. Sign Out Date: 10/20/2022  John Gill MD     10/24/22 CXR, 2 views (baseline)  Cardiac Silhouette: Within normal limits in size. Mediastinum: Normal mediastinal contours. Lungs: No airspace consolidation. No pneumothorax or sizable pleural effusion. Upper Abdomen: Normal   Miscellaneous: No fracture or suspicious osseous lesion. IMPRESSION:  No acute cardiopulmonary abnormality.     10/24/22 LFTs (baseline): normal Number and Complexity of Problems addressed and   Risks of complications and/or morbidity of management          pT3bN1 right posterior calf malignant melanoma  He is s/p WLE with STSG of a cT3bN0, pT3bN1 right posterior calf melanoma and right inguinofemoral sent node excision on 10/18/22    He had a deep margin which was close but negative. We did remove the gastrocnemius fascia at the deep aspect of the incision  One of the 2 right inguinofemoral nodes was positive for metastasis. I recommended he apply Desitin 3 times a week to the apical aspect of the graft where there is a small eschar. 12/7/22 PET/CT showed  Small region of uptake in right mid calf consistent with postsurgical changes related to recent melanoma resection (SUV 5.1). Postsurgical changes from a right inguinal lymph node dissection. More laterally in the anterior proximal thighs tiny nodular focus with slight elevation of FDG uptake, maximum SUV 2.6. Indeterminate dermal-based nodular focus more laterally in the anterior proximal thigh subq tissues with mild elevation of FDG uptake. Dr Jesse Mast reviewed PET CT and it appears there is a typo in the radiology report as there is only 1 nodular area in the right anterior thigh (report reads \"thighs\"). Dr Jesse Mast called radiology to report possible typo. Report addendum is likely soon      --->There is no clinical correlate in either thigh at this time; Follow    No aggressive appearing bone lesions. No evidence of metastatic disease in the head, neck, or chest.       We discussed inguinofemoral node dissection given his N1 node status vs serial ultrasounds and observation  He elects serial ultrasounds and observation. I referred him to medical oncology for adjuvant treatment options. See me in 2-3 weeks      Prostate Carcinoma  Focus of asymmetric elevated uptake in the right prostate on PET CT 12/7/22. Radiology recommended PSA and consideration of  prostate MRI. Pt has already been diagnosed with prostate carcinoma and is considering his options with Urology            Level of MDM (2/3 elements below)  Number and Complexity of Problems Addressed Amount and/or Complexity of Data to be Reviewed and Analyzed  *Each unique test, order, or document contributes to the combination of 2 or combination of 3 in Category 1 below. Risk of Complications and/or Morbidity or Mortality of pt Management     25044  29768 SF Minimal  ?1self-limited or minor problem Minimal or none Minimal risk of morbidity from additional diagnostic testing or Rx   54458  71362 Low Low  ? 2or more self-limited or minor problems;    or  ? 1stable chronic illness;    or  ?1acute, uncomplicated illness or injury   Limited  (Must meet the requirements of at least 1 of the 2 categories)  Category 1: Tests and documents   ? Any combination of 2 from the following:  ?Review of prior external note(s) from each unique source*;  ?review of the result(s) of each unique test*;   ?ordering of each unique test*    or   Category 2: Assessment requiring an independent historian(s)  (For the categories of independent interpretation of tests and discussion of management or test interpretation, see moderate or high) Low risk of morbidity from additional diagnostic testing or treatment     50888  54906 Mod Moderate  ? 1or more chronic illnesses with exacerbation, progression, or side effects of treatment;    or  ?2or more stable chronic illnesses;    or  ?1undiagnosed new problem with uncertain prognosis;    or  ?1acute illness with systemic symptoms;    or  ?1acute complicated injury   Moderate  (Must meet the requirements of at least 1 out of 3 categories)  Category 1: Tests, documents, or independent historian(s)  ? Any combination of 3 from the following:   ?Review of prior external note(s) from each unique source*;  ?Review of the result(s) of each unique test*;  ?Ordering of each unique test*;  ?Assessment requiring an independent historian(s)    or  Category 2: Independent interpretation of tests   ? Independent interpretation of a test performed by another physician/other qualified health care professional (not separately reported);     or  Category 3: Discussion of management or test interpretation  ? Discussion of management or test interpretation with external physician/other qualified health care professional/appropriate source (not separately reported)   Moderate risk of morbidity from additional diagnostic testing or treatment  Examples only:  ?Prescription drug management   ? Decision regarding minor surgery with identified patient or procedure risk factors  ? Decision regarding elective major surgery without identified patient or procedure risk factors   ? Diagnosis or treatment significantly limited by social determinants of health       49640  67030 High High  ? 1or more chronic illnesses with severe exacerbation, progression, or side effects of treatment;    or  ?1 acute or chronic illness or injury that poses a threat to life or bodily function   Extensive  (Must meet the requirements of at least 2 out of 3 categories)  Category 1: Tests, documents, or independent historian(s)  ? Any combination of 3 from the following:   ?Review of prior external note(s) from each unique source*;  ?Review of the result(s) of each unique test*;   ?Ordering of each unique test*;   ?Assessment requiring an independent historian(s)    or   Category 2: Independent interpretation of tests   ? Independent interpretation of a test performed by another physician/other qualified health care professional (not separately reported);     or  Category 3: Discussion of management or test interpretation  ? Discussion of management or test interpretation with external physician/other qualified health care professional/appropriate source (not separately reported)   High risk of morbidity from additional diagnostic testing or treatment  Examples only:  ?Drug therapy requiring intensive monitoring for toxicity  ? Decision regarding elective major surgery with identified patient or procedure risk factors  ? Decision regarding emergency major surgery  ? Decision regarding hospitalization  ? Decision not to resuscitate or to de-escalate care because of poor prognosis             I have personally performed a face-to-face diagnostic evaluation and management  service on this patient. I have independently seen the patient. I have independently obtained the above history from the patient/family. I have independently examined the patient with above findings. I have independently reviewed data/labs for this patient and developed the above plan of care (MDM).       Signed: Cee Herrera MD  12/28/2022    6:41 AM

## 2023-01-11 DIAGNOSIS — C61 PROSTATE CA (HCC): Primary | ICD-10-CM

## 2023-01-23 DIAGNOSIS — C43.71 MELANOMA OF RIGHT POSTERIOR CALF (HCC): Primary | ICD-10-CM

## 2023-01-23 DIAGNOSIS — Z12.5 PROSTATE CANCER SCREENING: ICD-10-CM

## 2023-01-25 ENCOUNTER — OFFICE VISIT (OUTPATIENT)
Dept: SURGERY | Age: 78
End: 2023-01-25

## 2023-01-25 ENCOUNTER — OFFICE VISIT (OUTPATIENT)
Dept: ONCOLOGY | Age: 78
End: 2023-01-25
Payer: MEDICARE

## 2023-01-25 ENCOUNTER — HOSPITAL ENCOUNTER (OUTPATIENT)
Dept: LAB | Age: 78
Discharge: HOME | End: 2023-01-28
Payer: MEDICARE

## 2023-01-25 VITALS
SYSTOLIC BLOOD PRESSURE: 125 MMHG | HEART RATE: 89 BPM | HEIGHT: 69 IN | RESPIRATION RATE: 16 BRPM | WEIGHT: 184.7 LBS | DIASTOLIC BLOOD PRESSURE: 71 MMHG | BODY MASS INDEX: 27.36 KG/M2 | TEMPERATURE: 98.4 F | OXYGEN SATURATION: 98 %

## 2023-01-25 DIAGNOSIS — C43.71 MELANOMA OF RIGHT POSTERIOR CALF (HCC): ICD-10-CM

## 2023-01-25 DIAGNOSIS — Z12.5 PROSTATE CANCER SCREENING: ICD-10-CM

## 2023-01-25 DIAGNOSIS — C43.71 MELANOMA OF RIGHT POSTERIOR CALF (HCC): Primary | ICD-10-CM

## 2023-01-25 DIAGNOSIS — Z79.899 HIGH RISK MEDICATION USE: ICD-10-CM

## 2023-01-25 DIAGNOSIS — C61 PROSTATE CANCER (HCC): ICD-10-CM

## 2023-01-25 LAB
ALBUMIN SERPL-MCNC: 4.1 G/DL (ref 3.2–4.6)
ALBUMIN/GLOB SERPL: 1.3 (ref 0.4–1.6)
ALP SERPL-CCNC: 101 U/L (ref 50–136)
ALT SERPL-CCNC: 30 U/L (ref 12–65)
ANION GAP SERPL CALC-SCNC: 5 MMOL/L (ref 2–11)
AST SERPL-CCNC: 21 U/L (ref 15–37)
BASOPHILS # BLD: 0 K/UL (ref 0–0.2)
BASOPHILS NFR BLD: 1 % (ref 0–2)
BILIRUB SERPL-MCNC: 0.5 MG/DL (ref 0.2–1.1)
BUN SERPL-MCNC: 18 MG/DL (ref 8–23)
CALCIUM SERPL-MCNC: 9.6 MG/DL (ref 8.3–10.4)
CHLORIDE SERPL-SCNC: 105 MMOL/L (ref 101–110)
CO2 SERPL-SCNC: 30 MMOL/L (ref 21–32)
CREAT SERPL-MCNC: 1 MG/DL (ref 0.8–1.5)
DIFFERENTIAL METHOD BLD: ABNORMAL
EOSINOPHIL # BLD: 0.1 K/UL (ref 0–0.8)
EOSINOPHIL NFR BLD: 3 % (ref 0.5–7.8)
ERYTHROCYTE [DISTWIDTH] IN BLOOD BY AUTOMATED COUNT: 12.7 % (ref 11.9–14.6)
GLOBULIN SER CALC-MCNC: 3.2 G/DL (ref 2.8–4.5)
GLUCOSE SERPL-MCNC: 89 MG/DL (ref 65–100)
HCT VFR BLD AUTO: 41.6 % (ref 41.1–50.3)
HGB BLD-MCNC: 14.1 G/DL (ref 13.6–17.2)
IMM GRANULOCYTES # BLD AUTO: 0 K/UL (ref 0–0.5)
IMM GRANULOCYTES NFR BLD AUTO: 0 % (ref 0–5)
LYMPHOCYTES # BLD: 1.3 K/UL (ref 0.5–4.6)
LYMPHOCYTES NFR BLD: 27 % (ref 13–44)
MCH RBC QN AUTO: 33.3 PG (ref 26.1–32.9)
MCHC RBC AUTO-ENTMCNC: 33.9 G/DL (ref 31.4–35)
MCV RBC AUTO: 98.1 FL (ref 82–102)
MONOCYTES # BLD: 0.5 K/UL (ref 0.1–1.3)
MONOCYTES NFR BLD: 11 % (ref 4–12)
NEUTS SEG # BLD: 2.7 K/UL (ref 1.7–8.2)
NEUTS SEG NFR BLD: 58 % (ref 43–78)
NRBC # BLD: 0 K/UL (ref 0–0.2)
PLATELET # BLD AUTO: 216 K/UL (ref 150–450)
PMV BLD AUTO: 9.2 FL (ref 9.4–12.3)
POTASSIUM SERPL-SCNC: 3.9 MMOL/L (ref 3.5–5.1)
PROT SERPL-MCNC: 7.3 G/DL (ref 6.3–8.2)
PSA SERPL-MCNC: 4.6 NG/ML
RBC # BLD AUTO: 4.24 M/UL (ref 4.23–5.6)
SODIUM SERPL-SCNC: 140 MMOL/L (ref 133–143)
WBC # BLD AUTO: 4.7 K/UL (ref 4.3–11.1)

## 2023-01-25 PROCEDURE — 84153 ASSAY OF PSA TOTAL: CPT

## 2023-01-25 PROCEDURE — 85025 COMPLETE CBC W/AUTO DIFF WBC: CPT

## 2023-01-25 PROCEDURE — 99215 OFFICE O/P EST HI 40 MIN: CPT | Performed by: INTERNAL MEDICINE

## 2023-01-25 PROCEDURE — 80053 COMPREHEN METABOLIC PANEL: CPT

## 2023-01-25 PROCEDURE — 36415 COLL VENOUS BLD VENIPUNCTURE: CPT

## 2023-01-25 PROCEDURE — 1123F ACP DISCUSS/DSCN MKR DOCD: CPT | Performed by: INTERNAL MEDICINE

## 2023-01-25 RX ORDER — FAMOTIDINE 10 MG/ML
20 INJECTION, SOLUTION INTRAVENOUS
OUTPATIENT
Start: 2023-04-26

## 2023-01-25 RX ORDER — SODIUM CHLORIDE 9 MG/ML
5-40 INJECTION INTRAVENOUS PRN
OUTPATIENT
Start: 2023-02-01

## 2023-01-25 RX ORDER — EPINEPHRINE 1 MG/ML
0.3 INJECTION, SOLUTION, CONCENTRATE INTRAVENOUS PRN
OUTPATIENT
Start: 2023-04-05

## 2023-01-25 RX ORDER — MEPERIDINE HYDROCHLORIDE 50 MG/ML
12.5 INJECTION INTRAMUSCULAR; INTRAVENOUS; SUBCUTANEOUS PRN
OUTPATIENT
Start: 2023-04-26

## 2023-01-25 RX ORDER — FAMOTIDINE 10 MG/ML
20 INJECTION, SOLUTION INTRAVENOUS
OUTPATIENT
Start: 2023-05-17

## 2023-01-25 RX ORDER — FAMOTIDINE 10 MG/ML
20 INJECTION, SOLUTION INTRAVENOUS
OUTPATIENT
Start: 2023-04-05

## 2023-01-25 RX ORDER — ACETAMINOPHEN 325 MG/1
650 TABLET ORAL
OUTPATIENT
Start: 2023-02-22

## 2023-01-25 RX ORDER — SODIUM CHLORIDE 9 MG/ML
INJECTION, SOLUTION INTRAVENOUS CONTINUOUS
OUTPATIENT
Start: 2023-02-22

## 2023-01-25 RX ORDER — ACETAMINOPHEN 325 MG/1
650 TABLET ORAL
OUTPATIENT
Start: 2023-02-01

## 2023-01-25 RX ORDER — DIPHENHYDRAMINE HYDROCHLORIDE 50 MG/ML
50 INJECTION INTRAMUSCULAR; INTRAVENOUS
OUTPATIENT
Start: 2023-04-05

## 2023-01-25 RX ORDER — ONDANSETRON 2 MG/ML
8 INJECTION INTRAMUSCULAR; INTRAVENOUS
OUTPATIENT
Start: 2023-02-22

## 2023-01-25 RX ORDER — HEPARIN SODIUM (PORCINE) LOCK FLUSH IV SOLN 100 UNIT/ML 100 UNIT/ML
500 SOLUTION INTRAVENOUS PRN
OUTPATIENT
Start: 2023-04-26

## 2023-01-25 RX ORDER — MEPERIDINE HYDROCHLORIDE 50 MG/ML
12.5 INJECTION INTRAMUSCULAR; INTRAVENOUS; SUBCUTANEOUS PRN
OUTPATIENT
Start: 2023-05-17

## 2023-01-25 RX ORDER — DIPHENHYDRAMINE HYDROCHLORIDE 50 MG/ML
50 INJECTION INTRAMUSCULAR; INTRAVENOUS
OUTPATIENT
Start: 2023-05-17

## 2023-01-25 RX ORDER — DIPHENHYDRAMINE HYDROCHLORIDE 50 MG/ML
50 INJECTION INTRAMUSCULAR; INTRAVENOUS
OUTPATIENT
Start: 2023-02-01

## 2023-01-25 RX ORDER — ONDANSETRON 2 MG/ML
8 INJECTION INTRAMUSCULAR; INTRAVENOUS
OUTPATIENT
Start: 2023-02-01

## 2023-01-25 RX ORDER — ALBUTEROL SULFATE 90 UG/1
4 AEROSOL, METERED RESPIRATORY (INHALATION) PRN
OUTPATIENT
Start: 2023-02-22

## 2023-01-25 RX ORDER — SODIUM CHLORIDE 9 MG/ML
5-40 INJECTION INTRAVENOUS PRN
OUTPATIENT
Start: 2023-02-22

## 2023-01-25 RX ORDER — ALBUTEROL SULFATE 90 UG/1
4 AEROSOL, METERED RESPIRATORY (INHALATION) PRN
OUTPATIENT
Start: 2023-04-26

## 2023-01-25 RX ORDER — SODIUM CHLORIDE 9 MG/ML
INJECTION, SOLUTION INTRAVENOUS CONTINUOUS
OUTPATIENT
Start: 2023-05-17

## 2023-01-25 RX ORDER — FAMOTIDINE 10 MG/ML
20 INJECTION, SOLUTION INTRAVENOUS
OUTPATIENT
Start: 2023-02-01

## 2023-01-25 RX ORDER — MEPERIDINE HYDROCHLORIDE 50 MG/ML
12.5 INJECTION INTRAMUSCULAR; INTRAVENOUS; SUBCUTANEOUS PRN
OUTPATIENT
Start: 2023-02-01

## 2023-01-25 RX ORDER — ONDANSETRON 2 MG/ML
8 INJECTION INTRAMUSCULAR; INTRAVENOUS
OUTPATIENT
Start: 2023-05-17

## 2023-01-25 RX ORDER — HEPARIN SODIUM (PORCINE) LOCK FLUSH IV SOLN 100 UNIT/ML 100 UNIT/ML
500 SOLUTION INTRAVENOUS PRN
OUTPATIENT
Start: 2023-02-01

## 2023-01-25 RX ORDER — ONDANSETRON 2 MG/ML
8 INJECTION INTRAMUSCULAR; INTRAVENOUS
OUTPATIENT
Start: 2023-04-26

## 2023-01-25 RX ORDER — SODIUM CHLORIDE 9 MG/ML
5-250 INJECTION, SOLUTION INTRAVENOUS PRN
OUTPATIENT
Start: 2023-02-22

## 2023-01-25 RX ORDER — ACETAMINOPHEN 325 MG/1
650 TABLET ORAL
OUTPATIENT
Start: 2023-04-05

## 2023-01-25 RX ORDER — HEPARIN SODIUM (PORCINE) LOCK FLUSH IV SOLN 100 UNIT/ML 100 UNIT/ML
500 SOLUTION INTRAVENOUS PRN
OUTPATIENT
Start: 2023-03-15

## 2023-01-25 RX ORDER — SODIUM CHLORIDE 9 MG/ML
5-250 INJECTION, SOLUTION INTRAVENOUS PRN
OUTPATIENT
Start: 2023-04-26

## 2023-01-25 RX ORDER — EPINEPHRINE 1 MG/ML
0.3 INJECTION, SOLUTION, CONCENTRATE INTRAVENOUS PRN
OUTPATIENT
Start: 2023-02-01

## 2023-01-25 RX ORDER — SODIUM CHLORIDE 9 MG/ML
5-250 INJECTION, SOLUTION INTRAVENOUS PRN
OUTPATIENT
Start: 2023-03-15

## 2023-01-25 RX ORDER — ACETAMINOPHEN 325 MG/1
650 TABLET ORAL
OUTPATIENT
Start: 2023-05-17

## 2023-01-25 RX ORDER — ONDANSETRON 2 MG/ML
8 INJECTION INTRAMUSCULAR; INTRAVENOUS
OUTPATIENT
Start: 2023-03-15

## 2023-01-25 RX ORDER — SODIUM CHLORIDE 9 MG/ML
5-250 INJECTION, SOLUTION INTRAVENOUS PRN
OUTPATIENT
Start: 2023-04-05

## 2023-01-25 RX ORDER — FAMOTIDINE 10 MG/ML
20 INJECTION, SOLUTION INTRAVENOUS
OUTPATIENT
Start: 2023-03-15

## 2023-01-25 RX ORDER — SODIUM CHLORIDE 9 MG/ML
INJECTION, SOLUTION INTRAVENOUS CONTINUOUS
OUTPATIENT
Start: 2023-02-01

## 2023-01-25 RX ORDER — SODIUM CHLORIDE 9 MG/ML
5-250 INJECTION, SOLUTION INTRAVENOUS PRN
OUTPATIENT
Start: 2023-05-17

## 2023-01-25 RX ORDER — DIPHENHYDRAMINE HYDROCHLORIDE 50 MG/ML
50 INJECTION INTRAMUSCULAR; INTRAVENOUS
OUTPATIENT
Start: 2023-03-15

## 2023-01-25 RX ORDER — ACETAMINOPHEN 325 MG/1
650 TABLET ORAL
OUTPATIENT
Start: 2023-04-26

## 2023-01-25 RX ORDER — ONDANSETRON 2 MG/ML
8 INJECTION INTRAMUSCULAR; INTRAVENOUS
OUTPATIENT
Start: 2023-04-05

## 2023-01-25 RX ORDER — SODIUM CHLORIDE 9 MG/ML
INJECTION, SOLUTION INTRAVENOUS CONTINUOUS
OUTPATIENT
Start: 2023-04-26

## 2023-01-25 RX ORDER — HEPARIN SODIUM (PORCINE) LOCK FLUSH IV SOLN 100 UNIT/ML 100 UNIT/ML
500 SOLUTION INTRAVENOUS PRN
OUTPATIENT
Start: 2023-04-05

## 2023-01-25 RX ORDER — HEPARIN SODIUM (PORCINE) LOCK FLUSH IV SOLN 100 UNIT/ML 100 UNIT/ML
500 SOLUTION INTRAVENOUS PRN
OUTPATIENT
Start: 2023-05-17

## 2023-01-25 RX ORDER — SODIUM CHLORIDE 9 MG/ML
5-40 INJECTION INTRAVENOUS PRN
OUTPATIENT
Start: 2023-04-05

## 2023-01-25 RX ORDER — MEPERIDINE HYDROCHLORIDE 50 MG/ML
12.5 INJECTION INTRAMUSCULAR; INTRAVENOUS; SUBCUTANEOUS PRN
OUTPATIENT
Start: 2023-04-05

## 2023-01-25 RX ORDER — EPINEPHRINE 1 MG/ML
0.3 INJECTION, SOLUTION, CONCENTRATE INTRAVENOUS PRN
OUTPATIENT
Start: 2023-02-22

## 2023-01-25 RX ORDER — MEPERIDINE HYDROCHLORIDE 50 MG/ML
12.5 INJECTION INTRAMUSCULAR; INTRAVENOUS; SUBCUTANEOUS PRN
OUTPATIENT
Start: 2023-02-22

## 2023-01-25 RX ORDER — ACETAMINOPHEN 325 MG/1
650 TABLET ORAL
OUTPATIENT
Start: 2023-03-15

## 2023-01-25 RX ORDER — ALBUTEROL SULFATE 90 UG/1
4 AEROSOL, METERED RESPIRATORY (INHALATION) PRN
OUTPATIENT
Start: 2023-02-01

## 2023-01-25 RX ORDER — SODIUM CHLORIDE 9 MG/ML
5-40 INJECTION INTRAVENOUS PRN
OUTPATIENT
Start: 2023-04-26

## 2023-01-25 RX ORDER — MEPERIDINE HYDROCHLORIDE 50 MG/ML
12.5 INJECTION INTRAMUSCULAR; INTRAVENOUS; SUBCUTANEOUS PRN
OUTPATIENT
Start: 2023-03-15

## 2023-01-25 RX ORDER — EPINEPHRINE 1 MG/ML
0.3 INJECTION, SOLUTION, CONCENTRATE INTRAVENOUS PRN
OUTPATIENT
Start: 2023-05-17

## 2023-01-25 RX ORDER — SODIUM CHLORIDE 9 MG/ML
INJECTION, SOLUTION INTRAVENOUS CONTINUOUS
OUTPATIENT
Start: 2023-03-15

## 2023-01-25 RX ORDER — SODIUM CHLORIDE 9 MG/ML
5-250 INJECTION, SOLUTION INTRAVENOUS PRN
OUTPATIENT
Start: 2023-02-01

## 2023-01-25 RX ORDER — DIPHENHYDRAMINE HYDROCHLORIDE 50 MG/ML
50 INJECTION INTRAMUSCULAR; INTRAVENOUS
OUTPATIENT
Start: 2023-04-26

## 2023-01-25 RX ORDER — ALBUTEROL SULFATE 90 UG/1
4 AEROSOL, METERED RESPIRATORY (INHALATION) PRN
OUTPATIENT
Start: 2023-03-15

## 2023-01-25 RX ORDER — SODIUM CHLORIDE 9 MG/ML
5-40 INJECTION INTRAVENOUS PRN
OUTPATIENT
Start: 2023-05-17

## 2023-01-25 RX ORDER — SODIUM CHLORIDE 9 MG/ML
5-40 INJECTION INTRAVENOUS PRN
OUTPATIENT
Start: 2023-03-15

## 2023-01-25 RX ORDER — DIPHENHYDRAMINE HYDROCHLORIDE 50 MG/ML
50 INJECTION INTRAMUSCULAR; INTRAVENOUS
OUTPATIENT
Start: 2023-02-22

## 2023-01-25 RX ORDER — HEPARIN SODIUM (PORCINE) LOCK FLUSH IV SOLN 100 UNIT/ML 100 UNIT/ML
500 SOLUTION INTRAVENOUS PRN
OUTPATIENT
Start: 2023-02-22

## 2023-01-25 RX ORDER — ALBUTEROL SULFATE 90 UG/1
4 AEROSOL, METERED RESPIRATORY (INHALATION) PRN
OUTPATIENT
Start: 2023-05-17

## 2023-01-25 RX ORDER — EPINEPHRINE 1 MG/ML
0.3 INJECTION, SOLUTION, CONCENTRATE INTRAVENOUS PRN
OUTPATIENT
Start: 2023-03-15

## 2023-01-25 RX ORDER — FAMOTIDINE 10 MG/ML
20 INJECTION, SOLUTION INTRAVENOUS
OUTPATIENT
Start: 2023-02-22

## 2023-01-25 RX ORDER — EPINEPHRINE 1 MG/ML
0.3 INJECTION, SOLUTION, CONCENTRATE INTRAVENOUS PRN
OUTPATIENT
Start: 2023-04-26

## 2023-01-25 RX ORDER — ALBUTEROL SULFATE 90 UG/1
4 AEROSOL, METERED RESPIRATORY (INHALATION) PRN
OUTPATIENT
Start: 2023-04-05

## 2023-01-25 RX ORDER — SODIUM CHLORIDE 9 MG/ML
INJECTION, SOLUTION INTRAVENOUS CONTINUOUS
OUTPATIENT
Start: 2023-04-05

## 2023-01-25 NOTE — PROGRESS NOTES
H&P/Consult Note/Progress Note/Office Note:   Alisha Mccall  MRN: [id number]  KSH:[birth year]  Age:77 y.o.    HPI: Alisha Mccall is a 65 y.o. male who is s/p WLE with STSG of a cT3bN0, pT3bN1 right posterior calf melanoma and right inguinofemoral sent node excision on 10/18/22    He had a deep margin which was close but negative. We did remove the gastrocnemius fascia at the deep aspect of the incision  One of the 2 right inguinofemoral nodes was positive for metastasis. Prior to surgery he was referred by Dr. Francella Sandifer for evaluation of cT3bN0 malignant melanoma of the right calf. He had a shave biopsy on 8/24/22 by Dr. Edel Bryant of the right distal calf which identified a malignant melanoma   Breslow 3.4 mm; IV; +ulceration, +regression; 20 mitosis/ sq mm; no microsatellitosis; no LVI    Lesion developed over several months and was getting larger in size. Nothing in particular made it better or worse. No associated weight loss. He takes a blood thinner but does not know the name. 10/24/22 CXR (baseline): No nodules  10/24/22 LFTs (baseline): normal        12/7/22 Whole-Body PET CT    Head and Neck: No enlarged or radiotracer avid lymph nodes. Chest: No discrete lung nodule. No mediastinal mass or lymphadenopathy. Multivessel coronary atherosclerotic calcification. Aortic atherosclerotic calcifications without aneurysmal dilation. Abdomen/Pelvis: No enlarged radiotracer avid lymph nodes in the abdomen or pelvis. Postsurgical changes from a right inguinal lymph node dissection. More laterally in the anterior proximal thighs tiny nodular focus with slight elevation of FDG uptake, maximum SUV 2.6. Penile prosthesis noted. There is a focus of asymmetric uptake in the right prostate. No aggressive appearing bone lesions. Small region of elevated tracer uptake in right mid calf, maximum SUV 5.1. Impression:  1.   Region of uptake in right mid calf consistent with postsurgical changes related to recent melanoma resection. 2. Status post right inguinal lymph node dissection. Indeterminate dermal-based nodular focus more laterally in the anterior proximal thigh subcutaneous tissues with mild elevation of FDG uptake. 3. No evidence of metastatic disease in the head, neck, or chest.   4. Focus of asymmetric elevated uptake in the right prostate. Recommend correlating with PSA and consider prostate MRI.                Past Medical History:   Diagnosis Date    History of cardioversion 09/01/2022    converted then a fib returned 2 weeks    Hyperlipidemia     Hypertension     New onset a-fib ([address] 08/17/2022    cardioversion @ [facility] 9/1/22-Afib returned 2 weeks later- Eliquis & Metoprolol- [address] [facility]     Past Surgical History:   Procedure Laterality Date    CARDIOVERSION  09/01/2022    & [facility]    CATARACT EXTRACTION      LEG BIOPSY EXCISION Right 10/18/2022    wide local excision right posterior calf melanoma with performed by Bronwen Boeck, MD at [place] [address] Right 10/18/2022    SENTINEL LYMPH NODE EXCISION RIGHT INGUINAL performed by Bronwen Boeck, MD at [address] [place] [address] Right 10/18/2022    SPLIT THICKNESS SKIN GRAFT, RIGHT LEG performed by Bronwen Boeck, MD at [facility] MAIN OR     Current Outpatient Medications   Medication Sig    losartan-hydroCHLOROthiazide (HYZAAR) 100-25 MG per tablet Take 1 tablet by mouth daily    apixaban (ELIQUIS) 5 MG TABS tablet Take 5 mg by mouth 2 times daily Hold 48 hours prior to surgery per medication hold clearance / [address] Cardiology    atorvastatin (LIPITOR) 20 MG tablet Take 20 mg by mouth every morning    cetirizine (ZYRTEC) 10 MG tablet Take 10 mg by mouth daily    metoprolol tartrate (LOPRESSOR) 25 MG tablet Take 25 mg by mouth 2 times daily    Multiple Vitamin (MULTIVITAMINS PO) Take 1 tablet by mouth daily    aspirin 81 MG EC tablet Take 81 mg by mouth daily     No current facility-administered medications for this visit. ALLERGIES:  Lisinopril    Social History     Socioeconomic History    Marital status:    Tobacco Use    Smoking status: Never    Smokeless tobacco: Never   Vaping Use    Vaping Use: Never used   Substance and Sexual Activity    Alcohol use: Not Currently    Drug use: Not Currently     Social History     Tobacco Use   Smoking Status Never   Smokeless Tobacco Never     Family History   Problem Relation Age of Onset    Cancer Mother      ROS: The patient has no difficulty with chest pain or shortness of breath. No fever or chills. Comprehensive review of systems was otherwise unremarkable except as noted above. Physical Exam:   There were no vitals taken for this visit. There were no vitals filed for this visit. [unfilled]  [unfilled]    Constitutional: Alert, oriented, cooperative patient in no acute distress; appears stated age    Eyes:Sclera are clear. EOMs intact  ENMT: no external lesions gross hearing normal; no obvious neck masses, no ear or lip lesions, nares normal  CV: RRR. Normal perfusion  Resp: No JVD. Breathing is  non-labored; no audible wheezing. GI: soft and non-distended       Healed right inguinofemoral, right anterior thigh, and right posterior calf incision sites   No satellite nodules  No regional adenopathy. No nodules in anterior or anterolateral thighs to correlate with PET CT    Musculoskeletal: unremarkable with normal function. No embolic signs or cyanosis.    Neuro:  Oriented; moves all 4; no focal deficits  Psychiatric: normal affect and mood, no memory impairment    Recent vitals (if inpt):  @IPVITALS(24:)@    Amount and/or Complexity of Data Reviewed and Analyzed:  I reviewed and analyzed all of the unique labs and radiologic studies that are shown below as well as any that are in the HPI, and any that are in the expanded problem list below  *Each unique test, order, or document contributes to the combination of 2 or combination of 3 in Category 1 below. For this visit I also reviewed old records and prior notes. No results for input(s): WBC, HGB, PLT, NA, K, CL, CO2, BUN, CREA, GLU, INR, APTT, ALT, AML, AML, LCAD, PCO2, PO2, HCO3 in the last 72 hours. Invalid input(s): PTP, TBIL, TBILI, CBIL, SGOT, GPT, AP, LPSE, NH4, TROPT, TROIQ,  PH  Review of most recent CBC  Lab Results   Component Value Date    HGB 13.5 (L) 10/11/2022       Review of most recent BMP  Lab Results   Component Value Date/Time     10/24/2022 02:51 PM    K 4.3 10/24/2022 02:51 PM     10/24/2022 02:51 PM    CO2 29 10/24/2022 02:51 PM    BUN 20 10/24/2022 02:51 PM    CREATININE 1.00 10/24/2022 02:51 PM    GLUCOSE 93 10/24/2022 02:51 PM    CALCIUM 9.9 10/24/2022 02:51 PM        Review of most recent LFTs (and lipase if done)  Lab Results   Component Value Date    ALT 37 10/24/2022    AST 25 10/24/2022    ALKPHOS 103 10/24/2022    BILITOT 0.4 10/24/2022     No results found for: LIPASE    No results found for: INR, APTT, LCAD    Review of most recent HgbA1c  No results found for: LABA1C  No results found for: EAG    Nutritional assessment screen for wound healing issues:  Lab Results   Component Value Date    LABALBU 4.1 10/24/2022       @lastcovr@    Xray Result (most recent):  XR CHEST STANDARD TWO VW 10/24/2022    Narrative  EXAMINATION: XR CHEST (2 VW) 10/24/2022 2:40 PM    ACCESSION NUMBER: [id number]    COMPARISON: None available    INDICATION: Malignant melanoma of right lower limb, including hip    TECHNIQUE: PA and lateral views of the chest were obtained. FINDINGS:  Support Devices:  *  None    Cardiac Silhouette: Within normal limits in size. Mediastinum: Normal mediastinal contours. Lungs: No airspace consolidation. No pneumothorax or sizable pleural effusion. Upper Abdomen: Normal    Miscellaneous: No fracture or suspicious osseous lesion.     Impression  No acute cardiopulmonary abnormality. CT Result (most recent):  No results found for this or any previous visit from the past 3650 days. US Result (most recent):  No results found for this or any previous visit from the past 3650 days. Admission date (for inpatients): (Not on file)   * No surgery found *  * No surgery found *        ASSESSMENT/PLAN:  [unfilled]  Active Problems:    * No active hospital problems. *  Resolved Problems:    * No resolved hospital problems. *     Patient Active Problem List    Diagnosis Date Noted    Melanoma of right posterior calf ([facility] [address] 09/21/2022     Priority: Medium     10/18/22 s/p WLE with STSG right posterior calf melanoma with right inguinofemoral sent node excision; Dr Joaquin Cortes, pT3bN1  DIAGNOSIS   A:  \"RIGHT INGUINOFEMORAL SENTINEL NODE 2\":  METASTATIC MELANOMA PRESENT. B:  \"WIDE LOCAL EXCISION RIGHT POSTERIOR CALF\":  MALIGNANT MELANOMA   CONFINED TO DERMIS AND SUBCUTANEOUS TISSUE, 3.9 MM IN DEPTH, DELORES'S LEVEL   5, TUMOR WITHIN LESS THAN 1 MM OF THE DEEP MARGIN, GREATER THAN 5 MM FROM   ALL PERIPHERAL MARGINS. SEPARATE FOCUS OF MELANOMA IN SITU AT AREA MARKED   BY SUTURE, MARGINS UNINVOLVED. C:  \"RIGHT INGUINOFEMORAL SENTINEL NODE 1\":  BENIGN LYMPH NODE, NEGATIVE FOR TUMOR. Sign Out Date: 10/20/2022  John De Los Santos MD     10/24/22 CXR, 2 views (baseline)  Cardiac Silhouette: Within normal limits in size. Mediastinum: Normal mediastinal contours. Lungs: No airspace consolidation. No pneumothorax or sizable pleural effusion. Upper Abdomen: Normal   Miscellaneous: No fracture or suspicious osseous lesion. IMPRESSION:  No acute cardiopulmonary abnormality.     10/24/22 LFTs (baseline): normal                Number and Complexity of Problems addressed and   Risks of complications and/or morbidity of management          pT3bN1 right posterior calf malignant melanoma  He is s/p WLE with STSG of a cT3bN0, pT3bN1 right posterior calf melanoma and right inguinofemoral sent node excision on 10/18/22    He had a deep margin which was close but negative. We did remove the gastrocnemius fascia at the deep aspect of the incision  One of the 2 right inguinofemoral nodes was positive for metastasis. I recommended he apply Desitin 3 times a week to the apical aspect of the graft where there is a small eschar. 12/7/22 PET/CT showed  Small region of uptake in right mid calf consistent with postsurgical changes related to recent melanoma resection (SUV 5.1). Postsurgical changes from a right inguinal lymph node dissection. More laterally in the anterior proximal thighs tiny nodular focus with slight elevation of FDG uptake, maximum SUV 2.6. Indeterminate dermal-based nodular focus more laterally in the anterior proximal thigh subq tissues with mild elevation of FDG uptake. Dr Torrie Yu reviewed PET CT and it appears there is a typo in the radiology report as there is only 1 nodular area in the right anterior thigh (report reads \"thighs\"). Dr Torrie Yu called radiology to report possible typo. --->There is no clinical correlate in either thigh at this time; Follow    No aggressive appearing bone lesions. No evidence of metastatic disease in the head, neck, or chest.       We discussed inguinofemoral node dissection given his N1 node status vs serial ultrasounds and observation  He elects serial US and observation. I referred him to medical oncology for adjuvant treatment options. See me in 2 months      Prostate Carcinoma  Focus of asymmetric elevated uptake in the right prostate on PET CT 12/7/22. Radiology recommended PSA and consideration of  prostate MRI.    Pt has already been diagnosed with prostate carcinoma and is considering his options with Urology            Level of MDM (2/3 elements below)  Number and Complexity of Problems Addressed Amount and/or Complexity of Data to be Reviewed and Analyzed  *Each unique test, order, or document contributes to the combination of 2 or combination of 3 in Category 1 below. Risk of Complications and/or Morbidity or Mortality of pt Management     99708  [id number] SF Minimal  ?1self-limited or minor problem Minimal or none Minimal risk of morbidity from additional diagnostic testing or Rx   48653  55207 Low Low  ? 2or more self-limited or minor problems;    or  ? 1stable chronic illness;    or  ?1acute, uncomplicated illness or injury   Limited  (Must meet the requirements of at least 1 of the 2 categories)  Category 1: Tests and documents   ? Any combination of 2 from the following:  ?Review of prior external note(s) from each unique source*;  ?review of the result(s) of each unique test*;   ?ordering of each unique test*    or   Category 2: Assessment requiring an independent historian(s)  (For the categories of independent interpretation of tests and discussion of management or test interpretation, see moderate or high) Low risk of morbidity from additional diagnostic testing or treatment     39003  [id number] Mod Moderate  ? 1or more chronic illnesses with exacerbation, progression, or side effects of treatment;    or  ?2or more stable chronic illnesses;    or  ?1undiagnosed new problem with uncertain prognosis;    or  ?1acute illness with systemic symptoms;    or  ?1acute complicated injury   Moderate  (Must meet the requirements of at least 1 out of 3 categories)  Category 1: Tests, documents, or independent historian(s)  ? Any combination of 3 from the following:   ?Review of prior external note(s) from each unique source*;  ?Review of the result(s) of each unique test*;  ?Ordering of each unique test*;  ?Assessment requiring an independent historian(s)    or  Category 2: Independent interpretation of tests   ? Independent interpretation of a test performed by another physician/other qualified health care professional (not separately reported);     or  Category 3: Discussion of management or test interpretation  ? Discussion of management or test interpretation with external physician/other qualified health care professional/appropriate source (not separately reported)   Moderate risk of morbidity from additional diagnostic testing or treatment  Examples only:  ?Prescription drug management   ? Decision regarding minor surgery with identified patient or procedure risk factors  ? Decision regarding elective major surgery without identified patient or procedure risk factors   ? Diagnosis or treatment significantly limited by social determinants of health       64202  43634 High High  ? 1or more chronic illnesses with severe exacerbation, progression, or side effects of treatment;    or  ?1 acute or chronic illness or injury that poses a threat to life or bodily function   Extensive  (Must meet the requirements of at least 2 out of 3 categories)  Category 1: Tests, documents, or independent historian(s)  ? Any combination of 3 from the following:   ?Review of prior external note(s) from each unique source*;  ?Review of the result(s) of each unique test*;   ?Ordering of each unique test*;   ?Assessment requiring an independent historian(s)    or   Category 2: Independent interpretation of tests   ? Independent interpretation of a test performed by another physician/other qualified health care professional (not separately reported);     or  Category 3: Discussion of management or test interpretation  ? Discussion of management or test interpretation with external physician/other qualified health care professional/appropriate source (not separately reported)   High risk of morbidity from additional diagnostic testing or treatment  Examples only:  ?Drug therapy requiring intensive monitoring for toxicity  ? Decision regarding elective major surgery with identified patient or procedure risk factors-we decided not to proceed with lymph node dissection.   He will report for possible adjuvant treatment and we will follow the area with serial ultrasounds and examination  ? Decision regarding emergency major surgery  ? Decision regarding hospitalization  ? Decision not to resuscitate or to de-escalate care because of poor prognosis             I have personally performed a face-to-face diagnostic evaluation and management  service on this patient. I have independently seen the patient. I have independently obtained the above history from the patient/family. I have independently examined the patient with above findings. I have independently reviewed data/labs for this patient and developed the above plan of care (MDM).       Signed: Jazz Louie MD  1/25/2023    10:22 AM

## 2023-01-25 NOTE — PROGRESS NOTES
[facility] Hematology & Oncology: Office Visit New Patient H/P    Chief Complaint:    Melanoma of right posterior calf pT3bN1    History of Present Illness:  Reason for Referral: Melanoma of right posterior calf     Referring Provider: Mario Norman MD     Primary Care Provider: Gilmar Bethea MD     Family History of Cancer/Hematologic Disorders: Family history is significant for mother with cervical cancer. Presenting Symptoms: Enlarging skin lesion to right calf     Mr. Maggie Burnett is a 65 y.o. white male with a history of tobacco use (former smoker), atrial fibrillation, cardioversion, HLD, HTN, cataract extraction, ED, penile prosthesis, prostate cancer, elevated MCV, B12 deficiency, IFG, right optic neuritis, kidney stone, and vertigo, who was recently diagnosed with melanoma of the right posterior calf. A shave biopsy of a lesion to patient's right distal calf done by Dermatologist, Dr. Elzbieta Antoine, on 8/24/22 identified a malignant melanoma, Breslow 3.4 mm; IV; +ulceration, +regression; 20 mitosis/ sq mm; no microscopic satellitosis; no LVI. The lesion had developed over several months and was getting larger. Patient was referred to Surgeon, Dr. Lianne Bundy, and evaluated in consultation on 9/21/22. He was assessed with signs and symptoms of a pT3bN0 right calf malignant melanoma and recommended for wide excision with split-thickness skin grafting and sentinel node excision. He was taken to the OR on 10/18/22 and underwent radical resection of soft tissue of right posterior calf for invasive melanoma; right inguinofemoral sentinel node excision x 2 following intraoperative mapping and identification; split-thickness skin grafting from right anterior thigh to right posterior calf; and advancement flap closure of right thigh graft harvest site. Two right inguinofemoral nodes and wide excision of right posterior calf was sent to Pathology for review.  Final pathology revealed malignant melanoma confined to dermis and subcutaneous tissue, measuring 3.9 mm in depth, Jose C's level 5. Tumor was within less than 1 mm of the deep margin and greater than 5 mm from all peripheral margins. Separate focus of melanoma in situ at the area was marked by suture. Margins were uninvolved. Metastatic melanoma was present in the right inguinofemoral sentinel node 2, and the right inguinofemoral sentinel node 1 was benign. Chest x-ray on 10/24/22 showed no acute cardiopulmonary abnormality. PET CT on 12/7/22 demonstrated a region of uptake in the right mid-calf consistent with postsurgical changes related to recent melanoma resection; status post right inguinal lymph node dissection; indeterminate dermal-based nodular focus more laterally in the anterior proximal thigh subcutaneous tissues with mild elevation of FDG uptake; no evidence of metastatic disease in the head, neck, or chest; and focus of asymmetric elevated uptake in the right prostate. Radiologist recommended correlating with PSA and considering prostate MRI. During post-op follow-up on 12/28/22, Dr. Leonel Julio notes, Darshan Villa discussed inguinofemoral node dissection given his N1 node status vs serial ultrasounds and observation. He elects serial ultrasounds and observation. I referred him to medical oncology for adjuvant treatment options.  Dr. Leonel Julio also notes that patient has already been diagnosed with prostate carcinoma and is considering his options with Urology. He is followed by Urologist, Dr. Malathi Hua at [facility]. He initially presented with an elevated PSA of 5.51 on 9/9/16. He was referred to Urologist, Dr. Viridiana Mercado, for further management. He opted to proceed with active surveillance at that time and continue to monitor PSA. He established with Dr. Eugene Farmer on 9/22/22 in consultation for his elevated PSA. Dr. Viridiana Mercado had recommended an MRI prostate be performed.  However, MRI prostate was not obtained due to penile prothesis implant that patient has in place per Dr. Keith Ramos report and MRI compatibility was unknown. Per patient, this was placed about 40 years ago. His PSA began to increase and was 10.939 as of 9/22/22. Since an MRI was unable to be obtained, Dr. Dk Mcduffie recommended proceeding with prostate biopsy due to persistent elevated PSA. Prostate biopsy with Dr. Dk Mcduffie on 10/3/22 revealed Helen 3+4, grade group 2 adenocarcinoma in 4 specimens and Ancona 3+3, grade group 1 adenocarcinoma in 5 specimens (9/12 specimens involved overall). He met with Dr. Brent Sanchez on 12/14/22 to discuss options for his prostate cancer. He declined ADT due to toxicity concerns. He was referred to Radiation Oncology and evaluated in consultation by Radiation Oncologist, Dr. Tony Craig, at [facility] on 1/12/23. Radiation treatment options were discussed, and patient requested time to further consider the options of prostatectomy versus radiation before making decision on how to proceed. PATHOLOGY REPORT 8/24/22     SURGICAL PATHOLOGY REPORT 10/3/22  Final Diagnosis    [facility] LABORATORY   A.  Prostate, right base, core biopsy:  Prostatic adenocarcinoma, Ancona score 3+3 = 6 (grade Group 1)  Tumor involves 10% of core     B. Prostate, right mid, core biopsy:  Prostatic adenocarcinoma, Ancona score 3+4 = 7 (grade group 2)  Percent pattern 4: 20%. Tumor involves 10% of core. C.  Prostate, right apex, core biopsy:  Prostatic adenocarcinoma, Ancona score 3+3 = 6 (grade Group 1). Tumor involves 20% of core. B.  Prostate, right lateral base, core biopsy:  Prostatic adenocarcinoma, Ancona score 3+3 = 6 (grade Group 1). Tumor involves 30% of core. E.  Prostate, right lateral mid, core biopsy:  Prostatic adenocarcinoma, Ancona score 3+3 = 6 (grade Group 1). Tumor involves 50% of core.      F.  Prostate, right lateral apex, core biopsy:  Prostatic adenocarcinoma, Ancona score 3+3 = 6 (grade Group 1).  Tumor involves 20% of core.     G-I. Prostate-left base, left mid, left apex-core biopsies:  Benign prostate tissue. J.  Prostate, left lateral base, core biopsy:  Prostatic adenocarcinoma, Printer score 3+4 = 7 (grade group 2). Percent pattern 4: 10%. Tumor involves 75% of core. K.  Prostate, left lateral mid, core biopsy:  Prostatic adenocarcinoma, Helen score 3+4 = 7 (grade group 2)  Percent pattern 4: 30%. Tumor involves 80%. L.  Prostate, left lateral apex, core biopsy:  Prostatic adenocarcinoma, Printer score 3+4 = 7 (grade group 2)  Percent pattern 4: 30%. Tumor involves 70% of core. [facility] SURGICAL PATHOLOGY REPORT 10/18/22  DIAGNOSIS   A: RIGHT INGUINOFEMORAL SENTINEL NODE 2: METASTATIC MELANOMA PRESENT. B: WIDE LOCAL EXCISION RIGHT POSTERIOR CALF: MALIGNANT MELANOMA CONFINED TO DERMIS AND SUBCUTANEOUS TISSUE, 3.9 MM IN DEPTH, DELORES'S LEVEL 5, TUMOR WITHIN LESS THAN 1 MM OF THE DEEP MARGIN, GREATER THAN 5 MM FROM ALL PERIPHERAL MARGINS. SEPARATE FOCUS OF MELANOMA IN SITU AT AREA MARKED BY SUTURE, MARGINS UNINVOLVED. C: RIGHT INGUINOFEMORAL SENTINEL NODE 1: BENIGN LYMPH NODE, NEGATIVE FOR TUMOR. XR CHEST (2 VW) 10/24/2022  FINDINGS:   Support Devices:   *  None  Cardiac Silhouette: Within normal limits in size. Mediastinum: Normal mediastinal contours. Lungs: No airspace consolidation. No pneumothorax or sizable pleural effusion. Upper Abdomen: Normal    Miscellaneous: No fracture or suspicious osseous lesion. IMPRESSION: No acute cardiopulmonary abnormality. WHOLE-BODY PET/CT  12/7/22  FINDINGS:  Head and Neck: No enlarged or radiotracer avid lymph nodes. Chest: No discrete lung nodule. No mediastinal mass or lymphadenopathy. Multivessel coronary atherosclerotic calcification. Aortic atherosclerotic calcifications without aneurysmal dilation. Abdomen/Pelvis: No enlarged radiotracer avid lymph nodes in the abdomen or pelvis.  Postsurgical changes from a right inguinal lymph node dissection. More laterally in the anterior proximal thighs tiny nodular focus with slight elevation of FDG uptake, maximum SUV 2.6. Penile prosthesis noted. There is a focus of asymmetric uptake in the right prostate. No aggressive appearing bone lesions. Small region of elevated tracer uptake in right mid calf, maximum SUV 5.1. IMPRESSION:  1. Region of uptake in right mid calf consistent with postsurgical changes related to recent melanoma resection. 2.  Status post right inguinal lymph node dissection. Indeterminate dermal-based nodular focus more laterally in the anterior proximal thigh subcutaneous tissues with mild elevation of FDG uptake. 3.  No evidence of metastatic disease in the head, neck, or chest.  4.  Focus of asymmetric elevated uptake in the right prostate. Recommend correlating with PSA and consider prostate MRI. Review of Systems:  Constitutional Denies fever or chills. Denies weight loss or appetite changes. Denies fatigue. Denies anorexia. HEENT Denies trauma, bluring vision, hearing loss, ear pain, nosebleeds, sore throat, neck pain and ear discharge. Skin Denies lesions or rashes. Lungs Denies shortness of breath, cough, sputum production or hemoptysis. Cardiovascular Denies chest pain, palpitations, orthopnea, claudication and leg swelling. Gastrointestinal Denies nausea, vomiting, bowel changes. Denies bloody or black stools. Denies abdominal pain.  Denies dysuria, frequency or hesitancy of urination   Neuro Denies headaches, visual changes or ataxia. Denies dizziness, tingling, tremors, sensory change, speech change, focal weakness and headaches. Hematology Denies nasal/gum bleeding, denies easy bruise   Endo Denies heat/cold intolerance, denies diabetes. MSK Denies back pain, swollen legs, myalgias and falls. Psychiatric/Behavioral Denies depression and substance abuse. The patient is not nervous/anxious.        Allergies Allergen Reactions    Lisinopril Cough     Past Medical History:   Diagnosis Date    History of cardioversion 09/01/2022    converted then a fib returned 2 weeks    Hyperlipidemia     Hypertension     New onset a-fib ([address] 08/17/2022    cardioversion @ [place] 9/1/22-Afib returned 2 weeks later- Eliquis & Metoprolol- [address] Cardiology     Past Surgical History:   Procedure Laterality Date    CARDIOVERSION  09/01/2022    & [facility]    CATARACT EXTRACTION      LEG BIOPSY EXCISION Right 10/18/2022    wide local excision right posterior calf melanoma with performed by Joel Kolb MD at [place] [address] Right 10/18/2022    SENTINEL LYMPH NODE EXCISION RIGHT INGUINAL performed by Joel Kolb MD at [address] [place] [address] Right 10/18/2022    SPLIT THICKNESS SKIN GRAFT, RIGHT LEG performed by Joel Kolb MD at [facility] MAIN OR     Family History   Problem Relation Age of Onset    Cancer Mother      Social History     Socioeconomic History    Marital status:      Spouse name: Not on file    Number of children: Not on file    Years of education: Not on file    Highest education level: Not on file   Occupational History    Not on file   Tobacco Use    Smoking status: Never    Smokeless tobacco: Never   Vaping Use    Vaping Use: Never used   Substance and Sexual Activity    Alcohol use: Not Currently    Drug use: Not Currently    Sexual activity: Not on file   Other Topics Concern    Not on file   Social History Narrative    Not on file     Social Determinants of Health     Financial Resource Strain: Not on file   Food Insecurity: Not on file   Transportation Needs: Not on file   Physical Activity: Not on file   Stress: Not on file   Social Connections: Not on file   Intimate Partner Violence: Not on file   Housing Stability: Not on file     Current Outpatient Medications   Medication Sig Dispense Refill    losartan-hydroCHLOROthiazide (HYZAAR) 100-25 MG per tablet Take 1 tablet by mouth daily      apixaban (ELIQUIS) 5 MG TABS tablet Take 5 mg by mouth 2 times daily Hold 48 hours prior to surgery per medication hold clearance / [address] Cardiology      atorvastatin (LIPITOR) 20 MG tablet Take 20 mg by mouth every morning      cetirizine (ZYRTEC) 10 MG tablet Take 10 mg by mouth daily      metoprolol tartrate (LOPRESSOR) 25 MG tablet Take 25 mg by mouth 2 times daily      Multiple Vitamin (MULTIVITAMINS PO) Take 1 tablet by mouth daily       No current facility-administered medications for this visit. OBJECTIVE:  /71   Pulse 89   Temp 98.4 °F (36.9 °C)   Resp 16   Ht 5' 9.29\" (1.76 m)   Wt 184 lb 11.2 oz (83.8 kg)   SpO2 98%   BMI 27.05 kg/m²     Physical Exam:  Constitutional: Oriented to person, place, and time. Well-developed and well-nourished. HEENT: Normocephalic and atraumatic. Oropharynx is clear and moist.   Conjunctivae and EOM are normal. Pupils are equal, round, and reactive to light. No scleral icterus. Neck supple. No JVD present. No tracheal deviation present. No thyromegaly present. Lymph node No palpable submandibular, cervical, supraclavicular, axillary and inguinal lymph nodes. Skin Right groin and calf surgical scar/graft. Warm and dry. No bruising and no rash noted. No erythema. No pallor. Respiratory Effort normal and breath sounds normal.  No respiratory distress. No wheezes. No rales. No tenderness. CVS Normal rate, regular rhythm and normal heart sounds. Exam reveals no gallop, no friction and no rub. No murmur heard. Abdomen Soft. Bowel sounds are normal. Exhibits no distension. There is no tenderness. There is no rebound and no guarding. Neuro Normal reflexes. No cranial nerve deficit. Exhibits normal muscle tone, 5 of 5 strength of all extremities. MSK Normal range of motion in general.  No edema and no tenderness.    Psych Normal mood, affect, behavior, judgment and thought content      Labs:  Recent Results (from the past 24 hour(s))   CBC with Auto Differential    Collection Time: 01/25/23  2:57 PM   Result Value Ref Range    WBC 4.7 4.3 - 11.1 K/uL    RBC 4.24 4.23 - 5.6 M/uL    Hemoglobin 14.1 13.6 - 17.2 g/dL    Hematocrit 41.6 41.1 - 50.3 %    MCV 98.1 82.0 - 102.0 FL    MCH 33.3 (H) 26.1 - 32.9 PG    MCHC 33.9 31.4 - 35.0 g/dL    RDW 12.7 11.9 - 14.6 %    Platelets 058 564 - 449 K/uL    MPV 9.2 (L) 9.4 - 12.3 FL    nRBC 0.00 0.0 - 0.2 K/uL    Seg Neutrophils 58 43 - 78 %    Lymphocytes 27 13 - 44 %    Monocytes 11 4.0 - 12.0 %    Eosinophils % 3 0.5 - 7.8 %    Basophils 1 0.0 - 2.0 %    Immature Granulocytes 0 0.0 - 5.0 %    Segs Absolute 2.7 1.7 - 8.2 K/UL    Absolute Lymph # 1.3 0.5 - 4.6 K/UL    Absolute Mono # 0.5 0.1 - 1.3 K/UL    Absolute Eos # 0.1 0.0 - 0.8 K/UL    Basophils Absolute 0.0 0.0 - 0.2 K/UL    Absolute Immature Granulocyte 0.0 0.0 - 0.5 K/UL    Differential Type AUTOMATED     Comprehensive Metabolic Panel    Collection Time: 01/25/23  2:57 PM   Result Value Ref Range    Sodium 140 133 - 143 mmol/L    Potassium 3.9 3.5 - 5.1 mmol/L    Chloride 105 101 - 110 mmol/L    CO2 30 21 - 32 mmol/L    Anion Gap 5 2 - 11 mmol/L    Glucose 89 65 - 100 mg/dL    BUN 18 8 - 23 MG/DL    Creatinine 1.00 0.8 - 1.5 MG/DL    Est, Glom Filt Rate >60 >60 ml/min/1.73m2    Calcium 9.6 8.3 - 10.4 MG/DL    Total Bilirubin 0.5 0.2 - 1.1 MG/DL    ALT 30 12 - 65 U/L    AST 21 15 - 37 U/L    Alk Phosphatase 101 50 - 136 U/L    Total Protein 7.3 6.3 - 8.2 g/dL    Albumin 4.1 3.2 - 4.6 g/dL    Globulin 3.2 2.8 - 4.5 g/dL    Albumin/Globulin Ratio 1.3 0.4 - 1.6     PSA, Diagnostic    Collection Time: 01/25/23  2:57 PM   Result Value Ref Range    PSA 4.6 (H) <4.0 ng/mL       Imaging:  No results found for this or any previous visit. ASSESSMENT/PLAN:   Diagnosis Orders   1.  Melanoma of right posterior calf ([facility] [address]  CBC with Auto Differential    Comprehensive Metabolic Panel    CBC With Auto Differential    Comprehensive metabolic panel    TSH without Reflex    CORTISOL    HEPATITIS B SURFACE ANTIGEN    Hepatitis B core antibody, total    Hepatitis B surface antibody    CBC With Auto Differential    Comprehensive metabolic panel    CBC With Auto Differential    Comprehensive metabolic panel    TSH without Reflex    CBC With Auto Differential    Comprehensive metabolic panel    CBC With Auto Differential    Comprehensive metabolic panel    TSH without Reflex    CBC With Auto Differential    Comprehensive metabolic panel      2. Prostate cancer ([facility] [address]        3. High risk medication use          65 y.o. M consulted for right leg melanoma presented to [facility] on 1/25/2023. He was found of the right calf melanoma and Dr. Clayton Arrington performed the resection with sentinel lymph node dissection on 10/18/2022, final pathology showed T3BN1 melanoma with ulceration, healed well and we discussed that the indication of adjuvant immunotherapy to increase chance of cure, patient very interested and arranged to start [address] next week, patient will determine whether he wants to have a port placed, is also diagnosed with prostate cancer Helen score 7 pending definitive radiation therapy in [place], study has shown that [address] and radiation are compatible without significant increase of toxicity, educated for risk and management, arrange for authorization and return next week to start treatment but call as needed. All questions are answered to their satisfaction. They will call for further questions and concerns. ECOG PERFORMANCE STATUS - 0-Fully active, able to carry on all pre-disease performance without restriction. Pain - 0 - No pain/10. None/Minimal pain - not affecting QOL     Fatigue - No flowsheet data found. Distress - No flowsheet data found. Total time independently spent on today's visit was 60min.  This time included: face-to-face time evaluating the patient as well as additional non-face-to-face time spent on: Preparing to see the patient by obtaining and reviewing previous test results, records and medical history, Performing a medically appropriate history and exam and documenting relevant clinical information for this visit, Counseling and educating patient and family, Ordering medications, Communicating with other health care professionals and Referring patient to another health care provider. Elements of this note have been dictated via voice recognition software. Text and phrases may be limited by the accuracy and autoconversion of the software. The chart has been reviewed, but errors may still be present. Vijay Jarvis M.D.   [address]  Office : [phone]  Fax : [phone]

## 2023-01-25 NOTE — PATIENT INSTRUCTIONS
Patient Instructions from Today's Visit    Reason for Visit:  New patient melanoma    Diagnosis Information:  https://www.dermSearch/. net/about-us/asco-answers-patient-education-materials/vgdd-vstwawy-yino-sheets  Patient was educated and given handouts published by ASCO entitled ASCO Answers Fact Sheets about their diagnosis of melanoma during todays office visit. Plan: We recommend keytruda infusion through which is immunotherapy and start it next week. Infusion will be every three weeks for one year. You can always stop keytruda infusions if you decide not to continue. Per Dr Collette Smith prostate radiation should not cause problem with keytruda infusion.         Follow Up:  1 week    Recent Lab Results:  Hospital Outpatient Visit on 01/25/2023   Component Date Value Ref Range Status    WBC 01/25/2023 4.7  4.3 - 11.1 K/uL Final    RBC 01/25/2023 4.24  4.23 - 5.6 M/uL Final    Hemoglobin 01/25/2023 14.1  13.6 - 17.2 g/dL Final    Hematocrit 01/25/2023 41.6  41.1 - 50.3 % Final    MCV 01/25/2023 98.1  82.0 - 102.0 FL Final    MCH 01/25/2023 33.3 (A)  26.1 - 32.9 PG Final    MCHC 01/25/2023 33.9  31.4 - 35.0 g/dL Final    RDW 01/25/2023 12.7  11.9 - 14.6 % Final    Platelets 14/50/7325 216  150 - 450 K/uL Final    MPV 01/25/2023 9.2 (A)  9.4 - 12.3 FL Final    nRBC 01/25/2023 0.00  0.0 - 0.2 K/uL Final    **Note: Absolute NRBC parameter is now reported with Hemogram**    Seg Neutrophils 01/25/2023 58  43 - 78 % Final    Lymphocytes 01/25/2023 27  13 - 44 % Final    Monocytes 01/25/2023 11  4.0 - 12.0 % Final    Eosinophils % 01/25/2023 3  0.5 - 7.8 % Final    Basophils 01/25/2023 1  0.0 - 2.0 % Final    Immature Granulocytes 01/25/2023 0  0.0 - 5.0 % Final    Segs Absolute 01/25/2023 2.7  1.7 - 8.2 K/UL Final    Absolute Lymph # 01/25/2023 1.3  0.5 - 4.6 K/UL Final    Absolute Mono # 01/25/2023 0.5  0.1 - 1.3 K/UL Final    Absolute Eos # 01/25/2023 0.1  0.0 - 0.8 K/UL Final    Basophils Absolute 01/25/2023 0.0  0.0 - 0.2 K/UL Final    Absolute Immature Granulocyte 01/25/2023 0.0  0.0 - 0.5 K/UL Final    Differential Type 01/25/2023 AUTOMATED    Final    Sodium 01/25/2023 140  133 - 143 mmol/L Final    Potassium 01/25/2023 3.9  3.5 - 5.1 mmol/L Final    Chloride 01/25/2023 105  101 - 110 mmol/L Final    CO2 01/25/2023 30  21 - 32 mmol/L Final    Anion Gap 01/25/2023 5  2 - 11 mmol/L Final    Glucose 01/25/2023 89  65 - 100 mg/dL Final    BUN 01/25/2023 18  8 - 23 MG/DL Final    Creatinine 01/25/2023 1.00  0.8 - 1.5 MG/DL Final    Est, Glom Filt Rate 01/25/2023 >60  >60 ml/min/1.73m2 Final    Comment:      Pediatric calculator link: [facility]. org/professionals/kdoqi/gfr_calculatorped       These results are not intended for use in patients <25years of age. eGFR results are calculated without a race factor using  the 2021 CKD-EPI equation. Careful clinical correlation is recommended, particularly when comparing to results calculated using previous equations. The CKD-EPI equation is less accurate in patients with extremes of muscle mass, extra-renal metabolism of creatinine, excessive creatine ingestion, or following therapy that affects renal tubular secretion. Calcium 01/25/2023 9.6  8.3 - 10.4 MG/DL Final    Total Bilirubin 01/25/2023 0.5  0.2 - 1.1 MG/DL Final    ALT 01/25/2023 30  12 - 65 U/L Final    AST 01/25/2023 21  15 - 37 U/L Final    Alk Phosphatase 01/25/2023 101  50 - 136 U/L Final    Total Protein 01/25/2023 7.3  6.3 - 8.2 g/dL Final    Albumin 01/25/2023 4.1  3.2 - 4.6 g/dL Final    Globulin 01/25/2023 3.2  2.8 - 4.5 g/dL Final    Albumin/Globulin Ratio 01/25/2023 1.3  0.4 - 1.6   Final    PSA 01/25/2023 4.6 (A)  <4.0 ng/mL Final    Comment: [employer]. New method in use, please reestablish patient baseline  [employer] [place] LOCI technology. Patient's results of tumor marker testing may not be comparable to labs using different manufacturers/methods.            Treatment Summary has been discussed and given to patient: n/a        -------------------------------------------------------------------------------------------------------------------  Please call our office at [phone] if you have any  of the following symptoms:   Fever of 100.5 or greater  Chills  Shortness of breath  Swelling or pain in one leg    After office hours an answering service is available and will contact a provider for emergencies or if you are experiencing any of the above symptoms. Patient does not express an interest in My Chart. My Chart log in information explained on the after visit summary printout at the [facility] V[facility] desk. Jia Ovalles RN        ASCO ANSWERS is a collection of oncologist-approved patient education materials developed by the [employer] of Clinical Oncology (ASCO) for people with cancer and their caregivers. ASCO ANSWERS is a collection of oncologist-approved patient education materials developed by the American Society of Clinical Oncology (ASCO) for people with cancer and their caregivers. Melanoma    What is melanoma? Melanoma is one of the most serious types of skin cancer. It begins when healthy color-producing cells called melanocytes change and grow out of control, eventually forming a mass called a tumor. Sometimes, melanoma develops from a normal mole a person already has on their skin. Melanoma can occur anywhere on the body. What is the function of the skin? The skin protects against infection and injury, helps regulate body temperature, stores water and fat, and produces vitamin D. Skin is made up of the epidermis (outer layer), the dermis (inner layer), and the hypodermis (deep layer of fat). The deepest layer of the epidermis contains melanocytes. What does stage mean? The stage is a way of describing where the cancer is located, if or where it has spread, and whether it is affecting other parts of the body.  There are 5 stages for melanoma: stage 0 (zero) and stages I through IV (1 through 4). Find more information at www.cancer. net/melanoma. How is melanoma treated? The treatment of melanoma depends on its thickness, whether the cancer has spread, the stage, whether there are specific genetic changes in melanoma cells, the rate of melanoma growth, and the persons overall health. If the melanoma has not spread to other parts of the body, surgery is the primary treatment. Most people with early-stage melanoma are cured with the first surgery. After surgery, stage II and stage III melanoma can be treated with radiation therapy or immunotherapy if there is a high risk that the cancer may come back. Stage III melanoma that cannot be removed with surgery is called unresectable.  Stage IV melanoma is when the cancer has spread to other parts of the body. Cancer in these stages is usually treated systemically. Treatment options include immunotherapy, targeted therapy, intralesional therapy, chemotherapy, radiation therapy, surgery, and isolated limb infusion therapy, which is when chemotherapy is isolated in a specific arm or leg. Talk with your doctor about all treatment options, including clinical trials. The side effects of melanoma treatment can often be prevented or managed with the help of your health care team. This is called palliative care and is an important part of the overall treatment plan. How can I cope with melanoma? Absorbing the news of a cancer diagnosis and communicating with your health care team are key parts of the coping process. Seeking support, organizing your health information, making sure all of your questions are answered, and participating in the decision-making process are other steps. Talk with your health care team about any concerns. Understanding your emotions and those of people close to you can be helpful in managing the diagnosis, treatment, and healing process.      [facility] [address] [place] [address]. © 2004 AMERICAN SOCIETY OF CLINICAL ONCOLOGY. MADE AVAILABLE [facility] OF CLINICAL ONCOLOGY   [place], [address] [place], [address] [facility]  Toll Free: [phone]  Phone: [phone] www. [employer]. [employer] Media  www.conquer. org © 2017 American Society of Clinical Oncology. For permissions information, contact [email or web address].     Questions to ask the health care team   Regular communication is important in making informed decisions about your health care. Consider asking your health care team the following questions:   What type or subtype of melanoma do I have? Can you explain my pathology report (laboratory test results) to me? What stage is the melanoma? What does this mean? Would you explain my treatment options? What clinical trials are available for me? Where are they located, and how do I                 find out more about them? Will surgery be able to remove all of the melanoma? Should I have a sentinel lymph node biopsy to find out if cancer has spread to                  the lymph nodes? Does my melanoma have a BRAF or other known genetic mutation? Is targeted               therapy or immunotherapy an option? What treatment plan do you recommend? Why? What is the goal of each treatment? Is it to eliminate the melanoma, help me feel             better, or both? Who will be part of my treatment team, and what does each member do? How will this treatment affect my daily life? Will I be able to work, exercise, and                perform my usual activities? Will this treatment affect my ability to become pregnant or have children? What long-term side effects may be associated with my cancer treatment? If Im worried about managing the costs of cancer care, who can help me? Where can I find emotional support for me and my family? Whom should I call with questions or problems?      Find more questions to ask the health care team at www.cancer. net/melanoma. For a digital list of questions, download Cancer. Nets free mobile markos at www.cancer. net/markos. The ideas and opinions expressed here do not necessarily reflect the opinions of the American Society of Clinical Oncology (ASCO) or [employer]. The information in this fact sheet is not intended as medical or legal advice, or as a substitute for consultation with a physician or other licensed health care provider. Patients with health care-related questions should call or see their physician or other health care provider promptly and should not disregard professional medical advice, or delay seeking it, because of information encountered here. The mention of any product, service, or treatment in this fact sheet should not be construed as an ASCO endorsement. [facility] is not responsible for any injury or damage to persons or property arising out of or related to any use of ASCOs patient education materials, or to any errors or omissions. To order more printed copies, please call [phone] or visit www.cancer. net/estore. TERMS TO KNOW     Biopsy: Removal of a tissue sample that is then examined under a microscope to check for cancer cells     Chemotherapy: The use of drugs to destroy cancer cells     Dermatologist: A doctor who specializes in treating diseases and conditions of the skin     Immunotherapy: Treatment designed to boost the bodys natural defenses to fight cancer     Lymph node: A tiny, bean-shaped organ that fights infection     Metastasis: The spread of cancer from where it began to another part of the body     Oncologist: A doctor who specializes in treating cancer     Radiation therapy: The use of high-energy x-rays to destroy cancer cells     Lacey lymph node:  The first lymph node to which cancer is likely to spread from a primary tumor     Lacey lymph node biopsy: Removal of sentinel lymph node(s) to find out if cancer   has spread     Targeted therapy: Treatment that targets specific genes or proteins that influence cancer growth and survival   [id number]

## 2023-01-25 NOTE — PROGRESS NOTES
New Patient Abstract    Reason for Referral: Melanoma of right posterior calf    Referring Provider: Quintin Chapman MD    Primary Care Provider: Lisa Hoang MD    Family History of Cancer/Hematologic Disorders: Family history is significant for mother with cervical cancer. Presenting Symptoms: Enlarging skin lesion to right calf    Narrative with recent with Results/Procedures/Biopsies and Dates completed: Mr. Gee Quintana is a 65-year-old white male with a history of tobacco use (former smoker), atrial fibrillation, cardioversion, HLD, HTN, cataract extraction, ED, penile prosthesis, prostate cancer, elevated MCV, B12 deficiency, IFG, right optic neuritis, kidney stone, and vertigo, who was recently diagnosed with melanoma of the right posterior calf. A shave biopsy of a lesion to patient's right distal calf done by Dermatologist, Dr. Giles Rosen, on 8/24/22 identified a malignant melanoma, Breslow 3.4 mm; IV; +ulceration, +regression; 20 mitosis/ sq mm; no microscopic satellitosis; no LVI. The lesion had developed over several months and was getting larger. Patient was referred to Surgeon, Dr. Lucy Álvarez, and evaluated in consultation on 9/21/22. He was assessed with signs and symptoms of a pT3bN0 right calf malignant melanoma and recommended for wide excision with split-thickness skin grafting and sentinel node excision. He was taken to the OR on 10/18/22 and underwent radical resection of soft tissue of right posterior calf for invasive melanoma; right inguinofemoral sentinel node excision x 2 following intraoperative mapping and identification; split-thickness skin grafting from right anterior thigh to right posterior calf; and advancement flap closure of right thigh graft harvest site. Two right inguinofemoral nodes and wide excision of right posterior calf was sent to Pathology for review.  Final pathology revealed malignant melanoma confined to dermis and subcutaneous tissue, measuring 3.9 mm in depth, Jose C's level 5. Tumor was within less than 1 mm of the deep margin and greater than 5 mm from all peripheral margins. Separate focus of melanoma in situ at the area was marked by suture. Margins were uninvolved. Metastatic melanoma was present in the right inguinofemoral sentinel node 2, and the right inguinofemoral sentinel node 1 was benign. Chest x-ray on 10/24/22 showed no acute cardiopulmonary abnormality. PET CT on 12/7/22 demonstrated a region of uptake in the right mid-calf consistent with postsurgical changes related to recent melanoma resection; status post right inguinal lymph node dissection; indeterminate dermal-based nodular focus more laterally in the anterior proximal thigh subcutaneous tissues with mild elevation of FDG uptake; no evidence of metastatic disease in the head, neck, or chest; and focus of asymmetric elevated uptake in the right prostate. Radiologist recommended correlating with PSA and considering prostate MRI. During post-op follow-up on 12/28/22, Dr. Mami Strickland notes, Parish May discussed inguinofemoral node dissection given his N1 node status vs serial ultrasounds and observation. He elects serial ultrasounds and observation. I referred him to medical oncology for adjuvant treatment options.  Dr. Mami Strickland also notes that patient has already been diagnosed with prostate carcinoma and is considering his options with Urology. He is followed by Urologist, Dr. Pratima Pradhan at [facility]. He initially presented with an elevated PSA of 5.51 on 9/9/16. He was referred to Urologist, Dr. Asia Thompson, for further management. He opted to proceed with active surveillance at that time and continue to monitor PSA. He established with Dr. Alyssa Baird on 9/22/22 in consultation for his elevated PSA. Dr. Aisa Thompson had recommended an MRI prostate be performed.  However, MRI prostate was not obtained due to penile prothesis implant that patient has in place per Dr. Mirella Cotton report and MRI compatibility was unknown. Per patient, this was placed about 40 years ago. His PSA began to increase and was 10.939 as of 9/22/22. Since an MRI was unable to be obtained, Dr. Jesus Lance recommended proceeding with prostate biopsy due to persistent elevated PSA. Prostate biopsy with Dr. Jesus Lance on 10/3/22 revealed Helen 3+4, grade group 2 adenocarcinoma in 4 specimens and Philadelphia 3+3, grade group 1 adenocarcinoma in 5 specimens (9/12 specimens involved overall). He met with Dr. Swathi Quach on 12/14/22 to discuss options for his prostate cancer. He declined ADT due to toxicity concerns. He was referred to Radiation Oncology and evaluated in consultation by Radiation Oncologist, Dr. Keith Tello, at [facility] on 1/12/23. Radiation treatment options were discussed, and patient requested time to further consider the options of prostatectomy versus radiation before making decision on how to proceed. PATHOLOGY REPORT 8/24/22      SURGICAL PATHOLOGY REPORT 10/3/22  Final Diagnosis   [facility] LABORATORY   A.  Prostate, right base, core biopsy:  Prostatic adenocarcinoma, Helen score 3+3 = 6 (grade Group 1)  Tumor involves 10% of core     B. Prostate, right mid, core biopsy:  Prostatic adenocarcinoma, Helen score 3+4 = 7 (grade group 2)  Percent pattern 4: 20%. Tumor involves 10% of core. C.  Prostate, right apex, core biopsy:  Prostatic adenocarcinoma, Helen score 3+3 = 6 (grade Group 1). Tumor involves 20% of core. B.  Prostate, right lateral base, core biopsy:  Prostatic adenocarcinoma, Helen score 3+3 = 6 (grade Group 1). Tumor involves 30% of core. E.  Prostate, right lateral mid, core biopsy:  Prostatic adenocarcinoma, Helen score 3+3 = 6 (grade Group 1). Tumor involves 50% of core. F.  Prostate, right lateral apex, core biopsy:  Prostatic adenocarcinoma, Philadelphia score 3+3 = 6 (grade Group 1). Tumor involves 20% of core.     G-I.   Prostate-left base, left mid, left apex-core biopsies:  Benign prostate tissue. J.  Prostate, left lateral base, core biopsy:  Prostatic adenocarcinoma, Helen score 3+4 = 7 (grade group 2). Percent pattern 4: 10%. Tumor involves 75% of core. K.  Prostate, left lateral mid, core biopsy:  Prostatic adenocarcinoma, Helen score 3+4 = 7 (grade group 2)  Percent pattern 4: 30%. Tumor involves 80%. L.  Prostate, left lateral apex, core biopsy:  Prostatic adenocarcinoma, [place] score 3+4 = 7 (grade group 2)  Percent pattern 4: 30%. Tumor involves 70% of core. [facility] SURGICAL PATHOLOGY REPORT 10/18/22  DIAGNOSIS   A: RIGHT INGUINOFEMORAL SENTINEL NODE 2: METASTATIC MELANOMA PRESENT. B: WIDE LOCAL EXCISION RIGHT POSTERIOR CALF: MALIGNANT MELANOMA CONFINED TO DERMIS AND SUBCUTANEOUS TISSUE, 3.9 MM IN DEPTH, DELORES'S LEVEL 5, TUMOR WITHIN LESS THAN 1 MM OF THE DEEP MARGIN, GREATER THAN 5 MM FROM ALL PERIPHERAL MARGINS. SEPARATE FOCUS OF MELANOMA IN SITU AT AREA MARKED BY SUTURE, MARGINS UNINVOLVED. C: RIGHT INGUINOFEMORAL SENTINEL NODE 1: BENIGN LYMPH NODE, NEGATIVE FOR TUMOR. XR CHEST (2 VW) 10/24/2022  FINDINGS:   Support Devices:   *  None  Cardiac Silhouette: Within normal limits in size. Mediastinum: Normal mediastinal contours. Lungs: No airspace consolidation. No pneumothorax or sizable pleural effusion. Upper Abdomen: Normal    Miscellaneous: No fracture or suspicious osseous lesion. IMPRESSION: No acute cardiopulmonary abnormality. WHOLE-BODY PET/CT  12/7/22  FINDINGS:  Head and Neck: No enlarged or radiotracer avid lymph nodes. Chest: No discrete lung nodule. No mediastinal mass or lymphadenopathy. Multivessel coronary atherosclerotic calcification. Aortic atherosclerotic calcifications without aneurysmal dilation. Abdomen/Pelvis: No enlarged radiotracer avid lymph nodes in the abdomen or pelvis. Postsurgical changes from a right inguinal lymph node dissection.  More laterally in the anterior proximal thighs tiny nodular focus with slight elevation of FDG uptake, maximum SUV 2.6. Penile prosthesis noted. There is a focus of asymmetric uptake in the right prostate. No aggressive appearing bone lesions. Small region of elevated tracer uptake in right mid calf, maximum SUV 5.1. IMPRESSION:  1. Region of uptake in right mid calf consistent with postsurgical changes related to recent melanoma resection. 2.  Status post right inguinal lymph node dissection. Indeterminate dermal-based nodular focus more laterally in the anterior proximal thigh subcutaneous tissues with mild elevation of FDG uptake. 3.  No evidence of metastatic disease in the head, neck, or chest.  4.  Focus of asymmetric elevated uptake in the right prostate. Recommend correlating with PSA and consider prostate MRI. Notes from Referring Provider: None    Other Pertinent Information: None    Presented at Tumor Board:  No

## 2023-01-25 NOTE — ONCOLOGY
START ON PATHWAY REGIMEN - Melanoma and Other Skin Cancers    [id number]        Pembrolizumab Juliethseverino Skinner)     **Always confirm dose/schedule in your pharmacy ordering system**    Patient Characteristics:  Melanoma, Cutaneous/Unknown Primary, Postoperative without Neoadjuvant Therapy (Pathologic Staging), Any pT, pN+, BRAF V600 Wild Type / BRAF V600 Results Pending or Unknown  Disease Classification: Melanoma  Disease Subtype: Cutaneous  BRAF V600 Mutation Status: Awaiting BRAF V600 Results  Therapeutic Status: Postoperative without Neoadjuvant Therapy (Pathologic Staging)  AJCC T Category: pT3b  AJCC N Category: pN1  AJCC M Category: cM0  AJCC 8 Stage Grouping: IIIC  Intent of Therapy:  Curative Intent, Discussed with Patient

## 2023-01-26 ENCOUNTER — CLINICAL DOCUMENTATION (OUTPATIENT)
Dept: ONCOLOGY | Age: 78
End: 2023-01-26

## 2023-01-26 NOTE — PROGRESS NOTES
I spoke with Lani gallardo via the telephone regarding his [employer] insurance coverage, potential oral medication authorizations, enrollment in the [employer] ([employer]) and the [address] [place] [address] ([address]), and assistance organization resource sheet. The patient will review the cancer programs. Next, I spoke with Lani gallardo regarding the Oncology Care Model Notification Letter. I answered questions regarding the costs associated with Medicare Benefits. I explained to Lani gallardo the estimated cost of treatment and services for six months under the [employer]. Lani gallardo was advised to contact Medicare or their healthcare provider for questions or concerns related to service of care. The Oncology Care Model provides different options of contact for Deisi Mckeon regarding concerns and complaints of treatments and services. The cost of 6 months of medical treatments and services can be estimated to be $13,743.00. Next, I spoke with Lani gallardo regarding his Prescription Drug Benefits. Next, I spoke with Lani gallardo about billing questions and treatment services. Next, I spoke with Lani gallardo regarding the financial assistance application. We discussed copayments, deductibles, and out of pocket maximums. Next, we discussed costs associated with the Keytruda Medication. Next, I spoke with Lani gallardo regarding potential oral medication authorizations. I told Lani gallardo that if he ever had any problems getting his oral medications filled to give the dedicated [facility]  a call. Most of the time, it is simply an authorization that needs to be done with the insurance company.   Lastly, I explained to Lani gallardo a form with various resource organizations that could assist with specific needs (example:  transportation, lodging, preparing meals, home cleaning)  Deisi Mckeon will receive a folder with agency contact information, financial assistance application, cancer resources, oncology care model letter, my chart brochure, and my business card. Veronica Doradoer wife expressed understanding of the information above and all questions were answered to her satisfaction.

## 2023-01-31 DIAGNOSIS — Z11.59 ENCOUNTER FOR SCREENING FOR OTHER VIRAL DISEASES: ICD-10-CM

## 2023-01-31 DIAGNOSIS — Z29.8 IMMUNOTHERAPY: ICD-10-CM

## 2023-01-31 DIAGNOSIS — C43.71 MELANOMA OF RIGHT POSTERIOR CALF (HCC): Primary | ICD-10-CM

## 2023-01-31 DIAGNOSIS — Z79.899 HIGH RISK MEDICATION USE: ICD-10-CM

## 2023-02-01 ENCOUNTER — OFFICE VISIT (OUTPATIENT)
Dept: ONCOLOGY | Age: 78
End: 2023-02-01
Payer: MEDICARE

## 2023-02-01 ENCOUNTER — HOSPITAL ENCOUNTER (OUTPATIENT)
Dept: LAB | Age: 78
Discharge: HOME | End: 2023-02-04
Payer: MEDICARE

## 2023-02-01 ENCOUNTER — HOSPITAL ENCOUNTER (OUTPATIENT)
Dept: INFUSION THERAPY | Age: 78
End: 2023-02-01

## 2023-02-01 VITALS
BODY MASS INDEX: 26.96 KG/M2 | RESPIRATION RATE: 16 BRPM | OXYGEN SATURATION: 99 % | SYSTOLIC BLOOD PRESSURE: 136 MMHG | WEIGHT: 182 LBS | DIASTOLIC BLOOD PRESSURE: 77 MMHG | HEIGHT: 69 IN | TEMPERATURE: 98 F | HEART RATE: 66 BPM

## 2023-02-01 DIAGNOSIS — Z11.59 ENCOUNTER FOR SCREENING FOR OTHER VIRAL DISEASES: ICD-10-CM

## 2023-02-01 DIAGNOSIS — Z29.8 IMMUNOTHERAPY: ICD-10-CM

## 2023-02-01 DIAGNOSIS — Z79.899 HIGH RISK MEDICATION USE: ICD-10-CM

## 2023-02-01 DIAGNOSIS — C43.71 MELANOMA OF RIGHT POSTERIOR CALF (HCC): Primary | ICD-10-CM

## 2023-02-01 DIAGNOSIS — C61 PROSTATE CANCER (HCC): ICD-10-CM

## 2023-02-01 DIAGNOSIS — C43.71 MELANOMA OF RIGHT POSTERIOR CALF (HCC): ICD-10-CM

## 2023-02-01 LAB
ALBUMIN SERPL-MCNC: 4 G/DL (ref 3.2–4.6)
ALBUMIN/GLOB SERPL: 1.2 (ref 0.4–1.6)
ALP SERPL-CCNC: 95 U/L (ref 50–136)
ALT SERPL-CCNC: 30 U/L (ref 12–65)
ANION GAP SERPL CALC-SCNC: 4 MMOL/L (ref 2–11)
AST SERPL-CCNC: 22 U/L (ref 15–37)
BASOPHILS # BLD: 0 K/UL (ref 0–0.2)
BASOPHILS NFR BLD: 1 % (ref 0–2)
BILIRUB SERPL-MCNC: 0.5 MG/DL (ref 0.2–1.1)
BUN SERPL-MCNC: 19 MG/DL (ref 8–23)
CALCIUM SERPL-MCNC: 9.6 MG/DL (ref 8.3–10.4)
CHLORIDE SERPL-SCNC: 107 MMOL/L (ref 101–110)
CO2 SERPL-SCNC: 28 MMOL/L (ref 21–32)
CORTIS BS SERPL-MCNC: 3.7 UG/DL
CREAT SERPL-MCNC: 0.9 MG/DL (ref 0.8–1.5)
DIFFERENTIAL METHOD BLD: ABNORMAL
EOSINOPHIL # BLD: 0.1 K/UL (ref 0–0.8)
EOSINOPHIL NFR BLD: 3 % (ref 0.5–7.8)
ERYTHROCYTE [DISTWIDTH] IN BLOOD BY AUTOMATED COUNT: 12.6 % (ref 11.9–14.6)
GLOBULIN SER CALC-MCNC: 3.4 G/DL (ref 2.8–4.5)
GLUCOSE SERPL-MCNC: 86 MG/DL (ref 65–100)
HBV SURFACE AB SERPL IA-ACNC: <3.1 MIU/ML
HBV SURFACE AG SER QL: NONREACTIVE
HCT VFR BLD AUTO: 40.5 % (ref 41.1–50.3)
HGB BLD-MCNC: 13.8 G/DL (ref 13.6–17.2)
IMM GRANULOCYTES # BLD AUTO: 0 K/UL (ref 0–0.5)
IMM GRANULOCYTES NFR BLD AUTO: 0 % (ref 0–5)
LYMPHOCYTES # BLD: 1.1 K/UL (ref 0.5–4.6)
LYMPHOCYTES NFR BLD: 26 % (ref 13–44)
MCH RBC QN AUTO: 33.7 PG (ref 26.1–32.9)
MCHC RBC AUTO-ENTMCNC: 34.1 G/DL (ref 31.4–35)
MCV RBC AUTO: 98.8 FL (ref 82–102)
MONOCYTES # BLD: 0.5 K/UL (ref 0.1–1.3)
MONOCYTES NFR BLD: 11 % (ref 4–12)
NEUTS SEG # BLD: 2.6 K/UL (ref 1.7–8.2)
NEUTS SEG NFR BLD: 59 % (ref 43–78)
NRBC # BLD: 0 K/UL (ref 0–0.2)
PLATELET # BLD AUTO: 207 K/UL (ref 150–450)
PMV BLD AUTO: 9.5 FL (ref 9.4–12.3)
POTASSIUM SERPL-SCNC: 4.1 MMOL/L (ref 3.5–5.1)
PROT SERPL-MCNC: 7.4 G/DL (ref 6.3–8.2)
RBC # BLD AUTO: 4.1 M/UL (ref 4.23–5.6)
SODIUM SERPL-SCNC: 139 MMOL/L (ref 133–143)
WBC # BLD AUTO: 4.4 K/UL (ref 4.3–11.1)

## 2023-02-01 PROCEDURE — 36415 COLL VENOUS BLD VENIPUNCTURE: CPT

## 2023-02-01 PROCEDURE — 82533 TOTAL CORTISOL: CPT

## 2023-02-01 PROCEDURE — 86706 HEP B SURFACE ANTIBODY: CPT

## 2023-02-01 PROCEDURE — 99214 OFFICE O/P EST MOD 30 MIN: CPT | Performed by: INTERNAL MEDICINE

## 2023-02-01 PROCEDURE — 85025 COMPLETE CBC W/AUTO DIFF WBC: CPT

## 2023-02-01 PROCEDURE — 86704 HEP B CORE ANTIBODY TOTAL: CPT

## 2023-02-01 PROCEDURE — 87340 HEPATITIS B SURFACE AG IA: CPT

## 2023-02-01 PROCEDURE — 80053 COMPREHEN METABOLIC PANEL: CPT

## 2023-02-01 PROCEDURE — 1123F ACP DISCUSS/DSCN MKR DOCD: CPT | Performed by: INTERNAL MEDICINE

## 2023-02-01 PROCEDURE — 84443 ASSAY THYROID STIM HORMONE: CPT

## 2023-02-01 ASSESSMENT — PATIENT HEALTH QUESTIONNAIRE - PHQ9
7. TROUBLE CONCENTRATING ON THINGS, SUCH AS READING THE NEWSPAPER OR WATCHING TELEVISION: 0
2. FEELING DOWN, DEPRESSED OR HOPELESS: 0
SUM OF ALL RESPONSES TO PHQ QUESTIONS 1-9: 0
5. POOR APPETITE OR OVEREATING: 0
8. MOVING OR SPEAKING SO SLOWLY THAT OTHER PEOPLE COULD HAVE NOTICED. OR THE OPPOSITE, BEING SO FIGETY OR RESTLESS THAT YOU HAVE BEEN MOVING AROUND A LOT MORE THAN USUAL: 0
10. IF YOU CHECKED OFF ANY PROBLEMS, HOW DIFFICULT HAVE THESE PROBLEMS MADE IT FOR YOU TO DO YOUR WORK, TAKE CARE OF THINGS AT HOME, OR GET ALONG WITH OTHER PEOPLE: 0
6. FEELING BAD ABOUT YOURSELF - OR THAT YOU ARE A FAILURE OR HAVE LET YOURSELF OR YOUR FAMILY DOWN: 0
SUM OF ALL RESPONSES TO PHQ9 QUESTIONS 1 & 2: 0
4. FEELING TIRED OR HAVING LITTLE ENERGY: 0
1. LITTLE INTEREST OR PLEASURE IN DOING THINGS: 0
SUM OF ALL RESPONSES TO PHQ QUESTIONS 1-9: 0
3. TROUBLE FALLING OR STAYING ASLEEP: 0
SUM OF ALL RESPONSES TO PHQ QUESTIONS 1-9: 0
SUM OF ALL RESPONSES TO PHQ QUESTIONS 1-9: 0
9. THOUGHTS THAT YOU WOULD BE BETTER OFF DEAD, OR OF HURTING YOURSELF: 0

## 2023-02-01 ASSESSMENT — ANXIETY QUESTIONNAIRES
GAD7 TOTAL SCORE: 0
7. FEELING AFRAID AS IF SOMETHING AWFUL MIGHT HAPPEN: 0
3. WORRYING TOO MUCH ABOUT DIFFERENT THINGS: 0
4. TROUBLE RELAXING: 0
6. BECOMING EASILY ANNOYED OR IRRITABLE: 0
2. NOT BEING ABLE TO STOP OR CONTROL WORRYING: 0
IF YOU CHECKED OFF ANY PROBLEMS ON THIS QUESTIONNAIRE, HOW DIFFICULT HAVE THESE PROBLEMS MADE IT FOR YOU TO DO YOUR WORK, TAKE CARE OF THINGS AT HOME, OR GET ALONG WITH OTHER PEOPLE: NOT DIFFICULT AT ALL
1. FEELING NERVOUS, ANXIOUS, OR ON EDGE: 0
5. BEING SO RESTLESS THAT IT IS HARD TO SIT STILL: 0

## 2023-02-01 NOTE — PROGRESS NOTES
[facility] Hematology & Oncology: Office Visit Progress Note    Chief Complaint:    Melanoma of right posterior calf pT3bN1    History of Present Illness:  Reason for Referral: Melanoma of right posterior calf     Referring Provider: Lexi Zapien MD     Primary Care Provider: Francisco Alvarado MD     Family History of Cancer/Hematologic Disorders: Family history is significant for mother with cervical cancer. Presenting Symptoms: Enlarging skin lesion to right calf     Mr. Romana Casanova is a 65 y.o. white male with a history of tobacco use (former smoker), atrial fibrillation, cardioversion, HLD, HTN, cataract extraction, ED, penile prosthesis, prostate cancer, elevated MCV, B12 deficiency, IFG, right optic neuritis, kidney stone, and vertigo, who was recently diagnosed with melanoma of the right posterior calf. A shave biopsy of a lesion to patient's right distal calf done by Dermatologist, Dr. Berenice Victoria, on 8/24/22 identified a malignant melanoma, Breslow 3.4 mm; IV; +ulceration, +regression; 20 mitosis/ sq mm; no microscopic satellitosis; no LVI. The lesion had developed over several months and was getting larger. Patient was referred to Surgeon, Dr. Ronak Ariza, and evaluated in consultation on 9/21/22. He was assessed with signs and symptoms of a pT3bN0 right calf malignant melanoma and recommended for wide excision with split-thickness skin grafting and sentinel node excision. He was taken to the OR on 10/18/22 and underwent radical resection of soft tissue of right posterior calf for invasive melanoma; right inguinofemoral sentinel node excision x 2 following intraoperative mapping and identification; split-thickness skin grafting from right anterior thigh to right posterior calf; and advancement flap closure of right thigh graft harvest site. Two right inguinofemoral nodes and wide excision of right posterior calf was sent to Pathology for review.  Final pathology revealed malignant melanoma confined to dermis and subcutaneous tissue, measuring 3.9 mm in depth, Jose C's level 5. Tumor was within less than 1 mm of the deep margin and greater than 5 mm from all peripheral margins. Separate focus of melanoma in situ at the area was marked by suture. Margins were uninvolved. Metastatic melanoma was present in the right inguinofemoral sentinel node 2, and the right inguinofemoral sentinel node 1 was benign. Chest x-ray on 10/24/22 showed no acute cardiopulmonary abnormality. PET CT on 12/7/22 demonstrated a region of uptake in the right mid-calf consistent with postsurgical changes related to recent melanoma resection; status post right inguinal lymph node dissection; indeterminate dermal-based nodular focus more laterally in the anterior proximal thigh subcutaneous tissues with mild elevation of FDG uptake; no evidence of metastatic disease in the head, neck, or chest; and focus of asymmetric elevated uptake in the right prostate. Radiologist recommended correlating with PSA and considering prostate MRI. During post-op follow-up on 12/28/22, Dr. Jesse Mast notes, Elliott Jennings discussed inguinofemoral node dissection given his N1 node status vs serial ultrasounds and observation. He elects serial ultrasounds and observation. I referred him to medical oncology for adjuvant treatment options.  Dr. Jesse Mast also notes that patient has already been diagnosed with prostate carcinoma and is considering his options with Urology. He is followed by Urologist, Dr. Jan Celaya at [facility]. He initially presented with an elevated PSA of 5.51 on 9/9/16. He was referred to Urologist, Dr. Dora Zaldivar, for further management. He opted to proceed with active surveillance at that time and continue to monitor PSA. He established with Dr. Pio Shankar on 9/22/22 in consultation for his elevated PSA. Dr. Dora Zaldivar had recommended an MRI prostate be performed.  However, MRI prostate was not obtained due to penile prothesis implant that patient has in place per Dr. Sugey Hernandez report and MRI compatibility was unknown. Per patient, this was placed about 40 years ago. His PSA began to increase and was 10.939 as of 9/22/22. Since an MRI was unable to be obtained, Dr. Tomas Gonzalez recommended proceeding with prostate biopsy due to persistent elevated PSA. Prostate biopsy with Dr. Tomas Gonzalez on 10/3/22 revealed Dundee 3+4, grade group 2 adenocarcinoma in 4 specimens and Helen 3+3, grade group 1 adenocarcinoma in 5 specimens (9/12 specimens involved overall). He met with Dr. Domenica Manriquez on 12/14/22 to discuss options for his prostate cancer. He declined ADT due to toxicity concerns. He was referred to Radiation Oncology and evaluated in consultation by Radiation Oncologist, Dr. Noemy Shah, at [place] on 1/12/23. Radiation treatment options were discussed, and patient requested time to further consider the options of prostatectomy versus radiation before making decision on how to proceed. PATHOLOGY REPORT 8/24/22     SURGICAL PATHOLOGY REPORT 10/3/22  Final Diagnosis    [facility] LABORATORY   A.  Prostate, right base, core biopsy:  Prostatic adenocarcinoma, Helen score 3+3 = 6 (grade Group 1)  Tumor involves 10% of core     B. Prostate, right mid, core biopsy:  Prostatic adenocarcinoma, Helen score 3+4 = 7 (grade group 2)  Percent pattern 4: 20%. Tumor involves 10% of core. C.  Prostate, right apex, core biopsy:  Prostatic adenocarcinoma, Dundee score 3+3 = 6 (grade Group 1). Tumor involves 20% of core. B.  Prostate, right lateral base, core biopsy:  Prostatic adenocarcinoma, Dundee score 3+3 = 6 (grade Group 1). Tumor involves 30% of core. E.  Prostate, right lateral mid, core biopsy:  Prostatic adenocarcinoma, Dundee score 3+3 = 6 (grade Group 1). Tumor involves 50% of core. F.  Prostate, right lateral apex, core biopsy:  Prostatic adenocarcinoma, Helen score 3+3 = 6 (grade Group 1).   Tumor involves 20% of core.     G-I. Prostate-left base, left mid, left apex-core biopsies:  Benign prostate tissue. J.  Prostate, left lateral base, core biopsy:  Prostatic adenocarcinoma, Dyess score 3+4 = 7 (grade group 2). Percent pattern 4: 10%. Tumor involves 75% of core. K.  Prostate, left lateral mid, core biopsy:  Prostatic adenocarcinoma, Helen score 3+4 = 7 (grade group 2)  Percent pattern 4: 30%. Tumor involves 80%. L.  Prostate, left lateral apex, core biopsy:  Prostatic adenocarcinoma, Helen score 3+4 = 7 (grade group 2)  Percent pattern 4: 30%. Tumor involves 70% of core. [facility] SURGICAL PATHOLOGY REPORT 10/18/22  DIAGNOSIS   A: RIGHT INGUINOFEMORAL SENTINEL NODE 2: METASTATIC MELANOMA PRESENT. B: WIDE LOCAL EXCISION RIGHT POSTERIOR CALF: MALIGNANT MELANOMA CONFINED TO DERMIS AND SUBCUTANEOUS TISSUE, 3.9 MM IN DEPTH, DELORES'S LEVEL 5, TUMOR WITHIN LESS THAN 1 MM OF THE DEEP MARGIN, GREATER THAN 5 MM FROM ALL PERIPHERAL MARGINS. SEPARATE FOCUS OF MELANOMA IN SITU AT AREA MARKED BY SUTURE, MARGINS UNINVOLVED. C: RIGHT INGUINOFEMORAL SENTINEL NODE 1: BENIGN LYMPH NODE, NEGATIVE FOR TUMOR. XR CHEST (2 VW) 10/24/2022  FINDINGS:   Support Devices:   *  None  Cardiac Silhouette: Within normal limits in size. Mediastinum: Normal mediastinal contours. Lungs: No airspace consolidation. No pneumothorax or sizable pleural effusion. Upper Abdomen: Normal    Miscellaneous: No fracture or suspicious osseous lesion. IMPRESSION: No acute cardiopulmonary abnormality. WHOLE-BODY PET/CT  12/7/22  FINDINGS:  Head and Neck: No enlarged or radiotracer avid lymph nodes. Chest: No discrete lung nodule. No mediastinal mass or lymphadenopathy. Multivessel coronary atherosclerotic calcification. Aortic atherosclerotic calcifications without aneurysmal dilation. Abdomen/Pelvis: No enlarged radiotracer avid lymph nodes in the abdomen or pelvis.  Postsurgical changes from a right inguinal lymph node dissection. More laterally in the anterior proximal thighs tiny nodular focus with slight elevation of FDG uptake, maximum SUV 2.6. Penile prosthesis noted. There is a focus of asymmetric uptake in the right prostate. No aggressive appearing bone lesions. Small region of elevated tracer uptake in right mid calf, maximum SUV 5.1. IMPRESSION:  1. Region of uptake in right mid calf consistent with postsurgical changes related to recent melanoma resection. 2.  Status post right inguinal lymph node dissection. Indeterminate dermal-based nodular focus more laterally in the anterior proximal thigh subcutaneous tissues with mild elevation of FDG uptake. 3.  No evidence of metastatic disease in the head, neck, or chest.  4.  Focus of asymmetric elevated uptake in the right prostate. Recommend correlating with PSA and consider prostate MRI. Review of Systems:  Constitutional Denies fever or chills. Denies weight loss or appetite changes. Denies fatigue. Denies anorexia. HEENT Denies trauma, bluring vision, hearing loss, ear pain, nosebleeds, sore throat, neck pain and ear discharge. Skin Denies lesions or rashes. Lungs Denies shortness of breath, cough, sputum production or hemoptysis. Cardiovascular Denies chest pain, palpitations, orthopnea, claudication and leg swelling. Gastrointestinal Denies nausea, vomiting, bowel changes. Denies bloody or black stools. Denies abdominal pain.  Denies dysuria, frequency or hesitancy of urination   Neuro Denies headaches, visual changes or ataxia. Denies dizziness, tingling, tremors, sensory change, speech change, focal weakness and headaches. Hematology Denies nasal/gum bleeding, denies easy bruise   Endo Denies heat/cold intolerance, denies diabetes. MSK Denies back pain, swollen legs, myalgias and falls. Psychiatric/Behavioral Denies depression and substance abuse. The patient is not nervous/anxious.        Allergies Allergen Reactions    Lisinopril Cough     Past Medical History:   Diagnosis Date    History of cardioversion 09/01/2022    converted then a fib returned 2 weeks    Hyperlipidemia     Hypertension     New onset a-fib ([address] 08/17/2022    cardioversion @ [place] 9/1/22-Afib returned 2 weeks later- Eliquis & Metoprolol- [address] [facility]iology     Past Surgical History:   Procedure Laterality Date    CARDIOVERSION  09/01/2022    & [facility]    CATARACT EXTRACTION      LEG BIOPSY EXCISION Right 10/18/2022    wide local excision right posterior calf melanoma with performed by Zac Colindres MD at [place] [address] Right 10/18/2022    SENTINEL LYMPH NODE EXCISION RIGHT INGUINAL performed by Zac Colindres MD at [address] [place] [address] Right 10/18/2022    SPLIT THICKNESS SKIN GRAFT, RIGHT LEG performed by Zac Colindres MD at [facility] MAIN OR     Family History   Problem Relation Age of Onset    Cancer Mother      Social History     Socioeconomic History    Marital status:      Spouse name: Not on file    Number of children: Not on file    Years of education: Not on file    Highest education level: Not on file   Occupational History    Not on file   Tobacco Use    Smoking status: Never    Smokeless tobacco: Never   Vaping Use    Vaping Use: Never used   Substance and Sexual Activity    Alcohol use: Not Currently    Drug use: Not Currently    Sexual activity: Not on file   Other Topics Concern    Not on file   Social History Narrative    Not on file     Social Determinants of Health     Financial Resource Strain: Not on file   Food Insecurity: Not on file   Transportation Needs: Not on file   Physical Activity: Not on file   Stress: Not on file   Social Connections: Not on file   Intimate Partner Violence: Not on file   Housing Stability: Not on file     Current Outpatient Medications   Medication Sig Dispense Refill    losartan-hydroCHLOROthiazide (HYZAAR) 100-25 MG per tablet Take 1 tablet by mouth daily      apixaban (ELIQUIS) 5 MG TABS tablet Take 5 mg by mouth 2 times daily Hold 48 hours prior to surgery per medication hold clearance / [facility]      atorvastatin (LIPITOR) 20 MG tablet Take 20 mg by mouth every morning      cetirizine (ZYRTEC) 10 MG tablet Take 10 mg by mouth daily      metoprolol tartrate (LOPRESSOR) 25 MG tablet Take 25 mg by mouth 2 times daily      Multiple Vitamin (MULTIVITAMINS PO) Take 1 tablet by mouth daily       No current facility-administered medications for this visit. OBJECTIVE:  /77   Pulse 66   Temp 98 °F (36.7 °C)   Resp 16   Ht 5' 9.29\" (1.76 m)   Wt 182 lb (82.6 kg)   SpO2 99%   BMI 26.65 kg/m²     Physical Exam:  Constitutional: Oriented to person, place, and time. Well-developed and well-nourished. HEENT: Normocephalic and atraumatic. Oropharynx is clear and moist.   Conjunctivae and EOM are normal. Pupils are equal, round, and reactive to light. No scleral icterus. Neck supple. No JVD present. No tracheal deviation present. No thyromegaly present. Lymph node No palpable submandibular, cervical, supraclavicular, axillary and inguinal lymph nodes. Skin Right groin and calf surgical scar/graft. Warm and dry. No bruising and no rash noted. No erythema. No pallor. Respiratory Effort normal and breath sounds normal.  No respiratory distress. No wheezes. No rales. No tenderness. CVS Normal rate, regular rhythm and normal heart sounds. Exam reveals no gallop, no friction and no rub. No murmur heard. Abdomen Soft. Bowel sounds are normal. Exhibits no distension. There is no tenderness. There is no rebound and no guarding. Neuro Normal reflexes. No cranial nerve deficit. Exhibits normal muscle tone, 5 of 5 strength of all extremities. MSK Normal range of motion in general.  No edema and no tenderness.    Psych Normal mood, affect, behavior, judgment and thought content      Labs:  Recent Results (from the past 24 hour(s))   CBC with Auto Differential    Collection Time: 02/01/23 12:46 PM   Result Value Ref Range    WBC 4.4 4.3 - 11.1 K/uL    RBC 4.10 (L) 4.23 - 5.6 M/uL    Hemoglobin 13.8 13.6 - 17.2 g/dL    Hematocrit 40.5 (L) 41.1 - 50.3 %    MCV 98.8 82.0 - 102.0 FL    MCH 33.7 (H) 26.1 - 32.9 PG    MCHC 34.1 31.4 - 35.0 g/dL    RDW 12.6 11.9 - 14.6 %    Platelets 165 244 - 168 K/uL    MPV 9.5 9.4 - 12.3 FL    nRBC 0.00 0.0 - 0.2 K/uL    Seg Neutrophils 59 43 - 78 %    Lymphocytes 26 13 - 44 %    Monocytes 11 4.0 - 12.0 %    Eosinophils % 3 0.5 - 7.8 %    Basophils 1 0.0 - 2.0 %    Immature Granulocytes 0 0.0 - 5.0 %    Segs Absolute 2.6 1.7 - 8.2 K/UL    Absolute Lymph # 1.1 0.5 - 4.6 K/UL    Absolute Mono # 0.5 0.1 - 1.3 K/UL    Absolute Eos # 0.1 0.0 - 0.8 K/UL    Basophils Absolute 0.0 0.0 - 0.2 K/UL    Absolute Immature Granulocyte 0.0 0.0 - 0.5 K/UL    Differential Type AUTOMATED         Imaging:  No results found for this or any previous visit. ASSESSMENT/PLAN:   Diagnosis Orders   1. Melanoma of right posterior calf ([facility]        2. Prostate cancer ([facility]        3. High risk medication use            65 y.o. M consulted for right leg melanoma presented to [facility] on 1/25/2023.   He was found of the right calf melanoma and Dr. Eliot Cervantes performed the resection with sentinel lymph node dissection on 10/18/2022, final pathology showed T3BN1 melanoma with ulceration, healed well and we discussed that the indication of adjuvant immunotherapy to increase chance of cure, patient very interested and arranged to start [address] next week, patient will determine whether he wants to have a port placed, is also diagnosed with prostate cancer [place] score 7 pending definitive radiation therapy in [place], study has shown that [address] and radiation are compatible without significant increase of toxicity, educated for risk and management, arrange for authorization and return 2/1/2023, proceed to cycle 1 Keytruda, coordinated with prostate radiation at [facility] as needed, Zofran as needed for nausea, return in 3 weeks for cycle 2 but call as needed. All questions are answered to their satisfaction. They will call for further questions and concerns. ECOG PERFORMANCE STATUS - 0-Fully active, able to carry on all pre-disease performance without restriction. Pain - 0 - No pain/10. None/Minimal pain - not affecting QOL     Fatigue - No flowsheet data found. Distress - No flowsheet data found. Elements of this note have been dictated via voice recognition software. Text and phrases may be limited by the accuracy and autoconversion of the software. The chart has been reviewed, but errors may still be present. Rosamaria Salgado M.D.   [address]  Office : [phone]  Fax : [phone]

## 2023-02-01 NOTE — PATIENT INSTRUCTIONS
Patient Instructions from Today's Visit    Reason for Visit:  Follow up melanoma    Diagnosis Information:  [email or web address]      Plan:  Start C1D1 Keytruda    Follow Up:  3 weeks    Recent Lab Results:  Hospital Outpatient Visit on 02/01/2023   Component Date Value Ref Range Status    WBC 02/01/2023 4.4  4.3 - 11.1 K/uL Final    RBC 02/01/2023 4.10 (A)  4.23 - 5.6 M/uL Final    Hemoglobin 02/01/2023 13.8  13.6 - 17.2 g/dL Final    Hematocrit 02/01/2023 40.5 (A)  41.1 - 50.3 % Final    MCV 02/01/2023 98.8  82.0 - 102.0 FL Final    MCH 02/01/2023 33.7 (A)  26.1 - 32.9 PG Final    MCHC 02/01/2023 34.1  31.4 - 35.0 g/dL Final    RDW 02/01/2023 12.6  11.9 - 14.6 % Final    Platelets 20/33/3767 207  150 - 450 K/uL Final    MPV 02/01/2023 9.5  9.4 - 12.3 FL Final    nRBC 02/01/2023 0.00  0.0 - 0.2 K/uL Final    **Note: Absolute NRBC parameter is now reported with Hemogram**    Seg Neutrophils 02/01/2023 59  43 - 78 % Final    Lymphocytes 02/01/2023 26  13 - 44 % Final    Monocytes 02/01/2023 11  4.0 - 12.0 % Final    Eosinophils % 02/01/2023 3  0.5 - 7.8 % Final    Basophils 02/01/2023 1  0.0 - 2.0 % Final    Immature Granulocytes 02/01/2023 0  0.0 - 5.0 % Final    Segs Absolute 02/01/2023 2.6  1.7 - 8.2 K/UL Final    Absolute Lymph # 02/01/2023 1.1  0.5 - 4.6 K/UL Final    Absolute Mono # 02/01/2023 0.5  0.1 - 1.3 K/UL Final    Absolute Eos # 02/01/2023 0.1  0.0 - 0.8 K/UL Final    Basophils Absolute 02/01/2023 0.0  0.0 - 0.2 K/UL Final    Absolute Immature Granulocyte 02/01/2023 0.0  0.0 - 0.5 K/UL Final    Differential Type 02/01/2023 AUTOMATED    Final         Treatment Summary has been discussed and given to patient: n/a        -------------------------------------------------------------------------------------------------------------------  Please call our office at [phone] if you have any  of the following symptoms:   Fever of 100.5 or greater  Chills  Shortness of breath  Swelling or pain in one leg    After office hours an answering service is available and will contact a provider for emergencies or if you are experiencing any of the above symptoms. Patient does not express an interest in My Chart. My Chart log in information explained on the after visit summary printout at the [facility] desk.     Beryl Richardson RN

## 2023-02-02 ENCOUNTER — HOSPITAL ENCOUNTER (OUTPATIENT)
Dept: INFUSION THERAPY | Age: 78
End: 2023-02-02

## 2023-02-02 ENCOUNTER — HOSPITAL ENCOUNTER (OUTPATIENT)
Dept: INFUSION THERAPY | Age: 78
Discharge: HOME | End: 2023-02-02
Payer: MEDICARE

## 2023-02-02 VITALS
TEMPERATURE: 98.3 F | BODY MASS INDEX: 26.39 KG/M2 | WEIGHT: 180.2 LBS | SYSTOLIC BLOOD PRESSURE: 120 MMHG | RESPIRATION RATE: 16 BRPM | OXYGEN SATURATION: 97 % | DIASTOLIC BLOOD PRESSURE: 71 MMHG | HEART RATE: 87 BPM

## 2023-02-02 DIAGNOSIS — C43.71 MELANOMA OF RIGHT POSTERIOR CALF (HCC): Primary | ICD-10-CM

## 2023-02-02 DIAGNOSIS — Z79.899 HIGH RISK MEDICATION USE: ICD-10-CM

## 2023-02-02 LAB — TSH, 3RD GENERATION: 3.62 UIU/ML (ref 0.36–3.74)

## 2023-02-02 PROCEDURE — 96413 CHEMO IV INFUSION 1 HR: CPT

## 2023-02-02 PROCEDURE — 2580000003 HC RX 258: Performed by: INTERNAL MEDICINE

## 2023-02-02 PROCEDURE — 6360000002 HC RX W HCPCS: Performed by: INTERNAL MEDICINE

## 2023-02-02 RX ORDER — SODIUM CHLORIDE 9 MG/ML
5-250 INJECTION, SOLUTION INTRAVENOUS PRN
Status: DISCONTINUED | OUTPATIENT
Start: 2023-02-02 | End: 2023-02-03 | Stop reason: HOSPADM

## 2023-02-02 RX ORDER — SODIUM CHLORIDE 9 MG/ML
5-40 INJECTION INTRAVENOUS PRN
Status: DISCONTINUED | OUTPATIENT
Start: 2023-02-02 | End: 2023-02-03 | Stop reason: HOSPADM

## 2023-02-02 RX ORDER — ONDANSETRON 2 MG/ML
8 INJECTION INTRAMUSCULAR; INTRAVENOUS
Status: DISCONTINUED | OUTPATIENT
Start: 2023-02-02 | End: 2023-02-03 | Stop reason: HOSPADM

## 2023-02-02 RX ORDER — DIPHENHYDRAMINE HYDROCHLORIDE 50 MG/ML
50 INJECTION INTRAMUSCULAR; INTRAVENOUS
Status: DISCONTINUED | OUTPATIENT
Start: 2023-02-02 | End: 2023-02-03 | Stop reason: HOSPADM

## 2023-02-02 RX ADMIN — SODIUM CHLORIDE 100 ML/HR: 9 INJECTION, SOLUTION INTRAVENOUS at 10:35

## 2023-02-02 RX ADMIN — SODIUM CHLORIDE 200 MG: 9 INJECTION, SOLUTION INTRAVENOUS at 11:00

## 2023-02-02 RX ADMIN — SODIUM CHLORIDE, PRESERVATIVE FREE 10 ML: 5 INJECTION INTRAVENOUS at 10:35

## 2023-02-02 NOTE — PROGRESS NOTES
Arrived to the [facility]. Assessment completed, labs reviewed. D1C1 Keytruda completed. Patient tolerated well, and monitored for 30 mins post infusion. .   Any issues or concerns during appointment: Upon completion of Keytruda, I noted swelling above PIV site. Pt stated no pain, I had checked PIV midway though infusion and no swelling as noted. PIV removed and arm elevated. Swelling decreased almost to baseline when pt discharged. Pt expressed interest in port placement, call to Dr Elma Uribe, who will reach out to pt. Patient is aware of next infusion appointment on 2/23/23 @2259. Patient instructed to call provider with temperature of 100.4 or greater or nausea/vomiting/ diarrhea or pain not controlled by medications  Discharged ambulatory.

## 2023-02-03 ENCOUNTER — TELEPHONE (OUTPATIENT)
Dept: ONCOLOGY | Age: 78
End: 2023-02-03

## 2023-02-03 LAB — HBV CORE AB SERPL QL IA: NEGATIVE

## 2023-02-03 NOTE — TELEPHONE ENCOUNTER
Notified Sridevi Amaral patient's wife of upcoming port insertion appt on 2/10/23 at 12:00. Ms Sridevi Amaral aware and verbalized understanding.

## 2023-02-09 ENCOUNTER — ANESTHESIA EVENT (OUTPATIENT)
Dept: INTERVENTIONAL RADIOLOGY/VASCULAR | Age: 78
End: 2023-02-09

## 2023-02-09 RX ORDER — ONDANSETRON 2 MG/ML
4 INJECTION INTRAMUSCULAR; INTRAVENOUS
OUTPATIENT
Start: 2023-02-09 | End: 2023-02-10

## 2023-02-09 RX ORDER — SODIUM CHLORIDE, SODIUM LACTATE, POTASSIUM CHLORIDE, CALCIUM CHLORIDE 600; 310; 30; 20 MG/100ML; MG/100ML; MG/100ML; MG/100ML
INJECTION, SOLUTION INTRAVENOUS CONTINUOUS
OUTPATIENT
Start: 2023-02-09

## 2023-02-09 RX ORDER — ACETAMINOPHEN 500 MG
1000 TABLET ORAL ONCE
OUTPATIENT
Start: 2023-02-09 | End: 2023-02-09

## 2023-02-09 RX ORDER — SODIUM CHLORIDE 9 MG/ML
INJECTION, SOLUTION INTRAVENOUS PRN
OUTPATIENT
Start: 2023-02-09

## 2023-02-09 RX ORDER — FENTANYL CITRATE 50 UG/ML
100 INJECTION, SOLUTION INTRAMUSCULAR; INTRAVENOUS
OUTPATIENT
Start: 2023-02-09 | End: 2023-02-10

## 2023-02-09 RX ORDER — SODIUM CHLORIDE 0.9 % (FLUSH) 0.9 %
5-40 SYRINGE (ML) INJECTION EVERY 12 HOURS SCHEDULED
OUTPATIENT
Start: 2023-02-09

## 2023-02-09 RX ORDER — SODIUM CHLORIDE 0.9 % (FLUSH) 0.9 %
5-40 SYRINGE (ML) INJECTION PRN
OUTPATIENT
Start: 2023-02-09

## 2023-02-09 RX ORDER — OXYCODONE HYDROCHLORIDE 5 MG/1
5 TABLET ORAL PRN
OUTPATIENT
Start: 2023-02-09 | End: 2023-02-09

## 2023-02-09 RX ORDER — HYDROMORPHONE HCL 110MG/55ML
0.5 PATIENT CONTROLLED ANALGESIA SYRINGE INTRAVENOUS EVERY 10 MIN PRN
OUTPATIENT
Start: 2023-02-09

## 2023-02-09 RX ORDER — DIPHENHYDRAMINE HYDROCHLORIDE 50 MG/ML
12.5 INJECTION INTRAMUSCULAR; INTRAVENOUS
OUTPATIENT
Start: 2023-02-09 | End: 2023-02-10

## 2023-02-09 RX ORDER — MIDAZOLAM HYDROCHLORIDE 2 MG/2ML
2 INJECTION, SOLUTION INTRAMUSCULAR; INTRAVENOUS
OUTPATIENT
Start: 2023-02-09 | End: 2023-02-10

## 2023-02-09 RX ORDER — OXYCODONE HYDROCHLORIDE 5 MG/1
10 TABLET ORAL PRN
OUTPATIENT
Start: 2023-02-09 | End: 2023-02-09

## 2023-02-09 RX ORDER — HYDROMORPHONE HCL 110MG/55ML
0.25 PATIENT CONTROLLED ANALGESIA SYRINGE INTRAVENOUS EVERY 5 MIN PRN
OUTPATIENT
Start: 2023-02-09

## 2023-02-10 ENCOUNTER — ANESTHESIA (OUTPATIENT)
Dept: INTERVENTIONAL RADIOLOGY/VASCULAR | Age: 78
End: 2023-02-10

## 2023-02-10 ENCOUNTER — HOSPITAL ENCOUNTER (OUTPATIENT)
Dept: INTERVENTIONAL RADIOLOGY/VASCULAR | Age: 78
End: 2023-02-10
Payer: MEDICARE

## 2023-02-10 VITALS
RESPIRATION RATE: 16 BRPM | OXYGEN SATURATION: 97 % | HEIGHT: 70 IN | HEART RATE: 83 BPM | TEMPERATURE: 97.2 F | BODY MASS INDEX: 25.77 KG/M2 | SYSTOLIC BLOOD PRESSURE: 132 MMHG | WEIGHT: 180 LBS | DIASTOLIC BLOOD PRESSURE: 70 MMHG

## 2023-02-10 DIAGNOSIS — Z79.899 HIGH RISK MEDICATION USE: ICD-10-CM

## 2023-02-10 DIAGNOSIS — C43.71 MELANOMA OF RIGHT POSTERIOR CALF (HCC): ICD-10-CM

## 2023-02-10 PROCEDURE — 6360000002 HC RX W HCPCS: Performed by: PHYSICIAN ASSISTANT

## 2023-02-10 PROCEDURE — 2580000003 HC RX 258: Performed by: NURSE ANESTHETIST, CERTIFIED REGISTERED

## 2023-02-10 PROCEDURE — 3700000001 HC ADD 15 MINUTES (ANESTHESIA)

## 2023-02-10 PROCEDURE — 6360000002 HC RX W HCPCS: Performed by: NURSE ANESTHETIST, CERTIFIED REGISTERED

## 2023-02-10 PROCEDURE — 2500000003 HC RX 250 WO HCPCS: Performed by: PHYSICIAN ASSISTANT

## 2023-02-10 PROCEDURE — 36561 INSERT TUNNELED CV CATH: CPT

## 2023-02-10 PROCEDURE — 3700000000 HC ANESTHESIA ATTENDED CARE

## 2023-02-10 RX ORDER — LIDOCAINE HYDROCHLORIDE AND EPINEPHRINE BITARTRATE 20; .01 MG/ML; MG/ML
INJECTION, SOLUTION SUBCUTANEOUS
Status: COMPLETED | OUTPATIENT
Start: 2023-02-10 | End: 2023-02-10

## 2023-02-10 RX ORDER — SODIUM CHLORIDE, SODIUM LACTATE, POTASSIUM CHLORIDE, CALCIUM CHLORIDE 600; 310; 30; 20 MG/100ML; MG/100ML; MG/100ML; MG/100ML
INJECTION, SOLUTION INTRAVENOUS CONTINUOUS PRN
Status: DISCONTINUED | OUTPATIENT
Start: 2023-02-10 | End: 2023-02-10 | Stop reason: SDUPTHER

## 2023-02-10 RX ORDER — PROPOFOL 10 MG/ML
INJECTION, EMULSION INTRAVENOUS PRN
Status: DISCONTINUED | OUTPATIENT
Start: 2023-02-10 | End: 2023-02-10 | Stop reason: SDUPTHER

## 2023-02-10 RX ORDER — HEPARIN SODIUM (PORCINE) LOCK FLUSH IV SOLN 100 UNIT/ML 100 UNIT/ML
SOLUTION INTRAVENOUS
Status: COMPLETED | OUTPATIENT
Start: 2023-02-10 | End: 2023-02-10

## 2023-02-10 RX ADMIN — PROPOFOL 25 MG: 10 INJECTION, EMULSION INTRAVENOUS at 12:55

## 2023-02-10 RX ADMIN — PROPOFOL 25 MG: 10 INJECTION, EMULSION INTRAVENOUS at 12:59

## 2023-02-10 RX ADMIN — HEPARIN 500 UNITS: 100 SYRINGE at 13:15

## 2023-02-10 RX ADMIN — PROPOFOL 25 MG: 10 INJECTION, EMULSION INTRAVENOUS at 13:09

## 2023-02-10 RX ADMIN — SODIUM CHLORIDE, SODIUM LACTATE, POTASSIUM CHLORIDE, AND CALCIUM CHLORIDE: 600; 310; 30; 20 INJECTION, SOLUTION INTRAVENOUS at 12:40

## 2023-02-10 RX ADMIN — PROPOFOL 50 MG: 10 INJECTION, EMULSION INTRAVENOUS at 12:50

## 2023-02-10 RX ADMIN — LIDOCAINE HYDROCHLORIDE,EPINEPHRINE BITARTRATE 100 MG: 20; .01 INJECTION, SOLUTION INFILTRATION; PERINEURAL at 13:00

## 2023-02-10 RX ADMIN — Medication 2 G: at 12:47

## 2023-02-10 ASSESSMENT — PAIN - FUNCTIONAL ASSESSMENT: PAIN_FUNCTIONAL_ASSESSMENT: NONE - DENIES PAIN

## 2023-02-10 NOTE — ANESTHESIA PRE PROCEDURE
Department of Anesthesiology  Preprocedure Note       Name:  Blaine Maurer   Age:  65 y.o.  :  1945                                          MRN:  [id number]         Date:  2/10/2023      Surgeon: * No surgeons listed *    Procedure: * No procedures listed *    Medications prior to admission:   Prior to Admission medications    Medication Sig Start Date End Date Taking? Authorizing Provider   losartan-hydroCHLOROthiazide (HYZAAR) 100-25 MG per tablet Take 1 tablet by mouth daily    Historical Provider, MD   apixaban (ELIQUIS) 5 MG TABS tablet Take 5 mg by mouth 2 times daily Hold 48 hours prior to surgery per medication hold clearance / [facility]    Historical Provider, MD   atorvastatin (LIPITOR) 20 MG tablet Take 20 mg by mouth every morning    Historical Provider, MD   cetirizine (ZYRTEC) 10 MG tablet Take 10 mg by mouth daily    Historical Provider, MD   metoprolol tartrate (LOPRESSOR) 25 MG tablet Take 25 mg by mouth 2 times daily    Historical Provider, MD   Multiple Vitamin (MULTIVITAMINS PO) Take 1 tablet by mouth daily    Historical Provider, MD       Current medications:    Current Outpatient Medications   Medication Sig Dispense Refill    losartan-hydroCHLOROthiazide (HYZAAR) 100-25 MG per tablet Take 1 tablet by mouth daily      apixaban (ELIQUIS) 5 MG TABS tablet Take 5 mg by mouth 2 times daily Hold 48 hours prior to surgery per medication hold clearance / [facility]      atorvastatin (LIPITOR) 20 MG tablet Take 20 mg by mouth every morning      cetirizine (ZYRTEC) 10 MG tablet Take 10 mg by mouth daily      metoprolol tartrate (LOPRESSOR) 25 MG tablet Take 25 mg by mouth 2 times daily      Multiple Vitamin (MULTIVITAMINS PO) Take 1 tablet by mouth daily       No current facility-administered medications for this encounter. Allergies:     Allergies   Allergen Reactions    Lisinopril Cough       Problem List:    Patient Active Problem List   Diagnosis Code    Melanoma of right posterior calf ([facility] [address] C43.71       Past Medical History:        Diagnosis Date    History of cardioversion 09/01/2022    converted then a fib returned 2 weeks    Hyperlipidemia     Hypertension     New onset a-fib ([facility] [address] 08/17/2022    cardioversion @ [facility] 9/1/22-Afib returned 2 weeks later- Eliquis & Metoprolol- [facility]       Past Surgical History:        Procedure Laterality Date    CARDIOVERSION  09/01/2022    & MONICA    CATARACT EXTRACTION      LEG BIOPSY EXCISION Right 10/18/2022    wide local excision right posterior calf melanoma with performed by [facility]col Tejada [facility] at [facility] Right 10/18/2022    SENTINEL LYMPH NODE EXCISION RIGHT INGUINAL performed by Maycol Tejada MD at [place] [address]      SKIN GRAFT Right 10/18/2022    SPLIT THICKNESS SKIN GRAFT, RIGHT LEG performed by Maycol Tejada MD at [facility] MAIN OR       Social History:    Social History     Tobacco Use    Smoking status: Never    Smokeless tobacco: Never   Substance Use Topics    Alcohol use: Not Currently                                Counseling given: Not Answered      Vital Signs (Current):   Vitals:    02/10/23 1050   BP: 117/77   Pulse: 74   Resp: 16   Temp: 97.9 °F (36.6 °C)   TempSrc: Temporal   SpO2: 98%   Weight: 180 lb (81.6 kg)   Height: 5' 10\" (1.778 m)                                              BP Readings from Last 3 Encounters:   02/10/23 117/77   02/02/23 120/71   02/01/23 136/77       NPO Status:                                                   Date of last liquid consumption: 02/09/23                        Date of last solid food consumption: 02/09/23    BMI:   Wt Readings from Last 3 Encounters:   02/10/23 180 lb (81.6 kg)   02/02/23 180 lb 3.2 oz (81.7 kg)   02/01/23 182 lb (82.6 kg)     Body mass index is 25.83 kg/m².     CBC:   Lab Results   Component Value Date/Time    WBC 4.4 02/01/2023 12:46 PM    RBC 4.10 02/01/2023 12:46 PM    HGB 13.8 02/01/2023 12:46 PM    HCT 40.5 02/01/2023 12:46 PM    MCV 98.8 02/01/2023 12:46 PM    RDW 12.6 02/01/2023 12:46 PM     02/01/2023 12:46 PM       CMP:   Lab Results   Component Value Date/Time     02/01/2023 12:46 PM    K 4.1 02/01/2023 12:46 PM     02/01/2023 12:46 PM    CO2 28 02/01/2023 12:46 PM    BUN 19 02/01/2023 12:46 PM    CREATININE 0.90 02/01/2023 12:46 PM    LABGLOM >60 02/01/2023 12:46 PM    GLUCOSE 86 02/01/2023 12:46 PM    PROT 7.4 02/01/2023 12:46 PM    CALCIUM 9.6 02/01/2023 12:46 PM    BILITOT 0.5 02/01/2023 12:46 PM    ALKPHOS 95 02/01/2023 12:46 PM    AST 22 02/01/2023 12:46 PM    ALT 30 02/01/2023 12:46 PM       POC Tests: No results for input(s): POCGLU, POCNA, POCK, POCCL, POCBUN, POCHEMO, POCHCT in the last 72 hours. Coags: No results found for: PROTIME, INR, APTT    HCG (If Applicable): No results found for: PREGTESTUR, PREGSERUM, HCG, HCGQUANT     ABGs: No results found for: PHART, PO2ART, DJW3CPP, JRX1GSJ, BEART, N6DMLUJG     Type & Screen (If Applicable):  No results found for: LABABO, LABRH    Drug/Infectious Status (If Applicable):  No results found for: HIV, HEPCAB    COVID-19 Screening (If Applicable): No results found for: COVID19        Anesthesia Evaluation  Patient summary reviewed no history of anesthetic complications:   Airway: Mallampati: II  TM distance: >3 FB   Neck ROM: full  Mouth opening: > = 3 FB   Dental: normal exam         Pulmonary:Negative Pulmonary ROS breath sounds clear to auscultation                             Cardiovascular:  Exercise tolerance: good (>4 METS),   (+) hypertension:, dysrhythmias (eliquis--none in 3 days): atrial fibrillation, hyperlipidemia        Rhythm: irregular  Rate: normal                    Neuro/Psych:   Negative Neuro/Psych ROS              GI/Hepatic/Renal: Neg GI/Hepatic/Renal ROS            Endo/Other:    (+) malignancy/cancer (prostate and melanoma).                  Abdominal:             Vascular: negative vascular ROS. Other Findings:           Anesthesia Plan      TIVA     ASA 3       Induction: intravenous. Anesthetic plan and risks discussed with patient.                         Bessie Maldonado MD   2/10/2023

## 2023-02-10 NOTE — OR NURSING
MAC recovery complete. Dr Claudell Guy in to see patient and states ok to d/c home with wife. All discharge instructions gone over with patient and wife, all questions answered. Paper copy signed and placed with patient's physical chart.

## 2023-02-10 NOTE — DISCHARGE INSTRUCTIONS
If you have any questions about your procedure, please call the Interventional Radiology department at [phone]. After business hours (5pm) and weekends, call the answering service at [phone] and ask for the Radiologist on call to be paged. Si tiene Preguntas acerca del procedimiento, por favor llame al departamento de Radiología Intervencional al [phone]. Después de horas de oficina (5 pm) y los fines de [facility], llamar al [facility] [place] al [phone] y pregunte por el Radiologo de [facility]. Interventional Radiology General Nurse Discharge    After general anesthesia or intravenous sedation, for 24 hours or while taking prescription Narcotics:  Limit your activities  Do not drive and operate hazardous machinery  Do not make important personal or business decisions  Do  not drink alcoholic beverages  If you have not urinated within 8 hours after discharge, please contact your surgeon on call. * Please give a list of your current medications to your Primary Care Provider. * Please update this list whenever your medications are discontinued, doses are     changed, or new medications (including over-the-counter products) are added. * Please carry medication information at all times in case of emergency situations. These are general instructions for a healthy lifestyle:    No smoking/ No tobacco products/ Avoid exposure to second hand smoke  Surgeon General's Warning:  Quitting smoking now greatly reduces serious risk to your health.     Obesity, smoking, and sedentary lifestyle greatly increases your risk for illness  A healthy diet, regular physical exercise & weight monitoring are important for maintaining a healthy lifestyle    You may be retaining fluid if you have a history of heart failure or if you experience any of the following symptoms:  Weight gain of 3 pounds or more overnight or 5 pounds in a week, increased swelling in our hands or feet or shortness of breath while lying flat in bed. Please call your doctor as soon as you notice any of these symptoms; do not wait until your next office visit. Recognize signs and symptoms of STROKE:  F-face looks uneven    A-arms unable to move or move unevenly    S-speech slurred or non-existent    T-time-call 911 as soon as signs and symptoms begin-DO NOT go       Back to bed or wait to see if you get better-TIME IS BRAIN.

## 2023-02-10 NOTE — BRIEF OP NOTE
Department of Interventional Radiology  [phone]        Interventional Radiology Brief Procedure Note    Patient: Beverly Allen MRN: [id number]  SSN: [id number]    YOB: 1945  Age: 65 y.o. Sex: male      Date of Procedure: 2/10/2023    Pre-Procedure Diagnosis: History of melanoma and prostate cancer    Post-Procedure Diagnosis: SAME    Procedure(s): Venous Chest Port Placement    Brief Description of Procedure: See PACS noted    Performed By: Domenica Barrios PA-C    Assistants: Malena Sinha PA-C    Anesthesia:TIVS/MAC    Estimated Blood Loss: Less than 10ml    Specimens:  None    Implants:  Subcutaneous Port    Findings: Tip terminates in right atrium    Complications: None    Recommendations: Bedrest minimum of 30 minutes. Leave Dermabond in place.   Port ready for use    Follow Up: As needed    Signed By: Cortes Cardona     February 10, 2023

## 2023-02-10 NOTE — H&P
Department of Interventional Radiology  [phone]    History and Physical    Patient:  Terra Canas MRN:  [id number]  SSN:  [id number]    YOB: 1945  Age:  65 y.o. Sex:  male      Primary Care Provider:  Linda Villatoro MD  Referring Physician:  Krunal Limon MD    Subjective:     Chief Complaint: malignant melanoma    History of the Present Illness: The patient is a 65 y.o. male with malignant melanoma, prostate cancer who presents for venous chest port placement. NPO. Past Medical History:   Diagnosis Date    History of cardioversion 09/01/2022    converted then a fib returned 2 weeks    Hyperlipidemia     Hypertension     New onset a-fib ([facility] [address] 08/17/2022    cardioversion @ [facility] 9/1/22-Afib returned 2 weeks later- Eliquis & Metoprolol- [facility]     Past Surgical History:   Procedure Laterality Date    CARDIOVERSION  09/01/2022    & MONICA    CATARACT EXTRACTION      LEG BIOPSY EXCISION Right 10/18/2022    wide local excision right posterior calf melanoma with performed by Lisa Barkley MD at [place] [id number] Right 10/18/2022    SENTINEL LYMPH NODE EXCISION RIGHT INGUINAL performed by Lisa Barkley MD at [address] [place] [address] Right 10/18/2022    SPLIT THICKNESS SKIN GRAFT, RIGHT LEG performed by Lisa Barkley MD at [facility] MAIN OR        Review of Systems:    Pertinent items are noted in HPI. Prior to Admission medications    Medication Sig Start Date End Date Taking?  Authorizing Provider   losartan-hydroCHLOROthiazide (HYZAAR) 100-25 MG per tablet Take 1 tablet by mouth daily    Historical Provider, MD   apixaban (ELIQUIS) 5 MG TABS tablet Take 5 mg by mouth 2 times daily Hold 48 hours prior to surgery per medication hold clearance / [facility]    Historical Provider, MD   atorvastatin (LIPITOR) 20 MG tablet Take 20 mg by mouth every morning    Historical Provider, MD   cetirizine (ZYRTEC) 10 MG tablet Take 10 mg by mouth daily    Historical Provider, MD   metoprolol tartrate (LOPRESSOR) 25 MG tablet Take 25 mg by mouth 2 times daily    Historical Provider, MD   Multiple Vitamin (MULTIVITAMINS PO) Take 1 tablet by mouth daily    Historical Provider, MD        Allergies   Allergen Reactions    Lisinopril Cough       Family History   Problem Relation Age of Onset    Cancer Mother      Social History     Tobacco Use    Smoking status: Never    Smokeless tobacco: Never   Substance Use Topics    Alcohol use: Not Currently        Not in a hospital admission.     Objective:       Physical Examination:    Vitals:    02/10/23 1050   BP: 117/77   Pulse: 74   Resp: 16   Temp: 97.9 °F (36.6 °C)   TempSrc: Temporal   SpO2: 98%   Weight: 180 lb (81.6 kg)   Height: 5' 10\" (1.778 m)       Pain Assessment       denies                                              HEART: regular rate and rhythm  LUNG: clear to auscultation bilaterally  ABDOMEN: normal findings: soft, non-tender  EXTREMITIES: warm ,no edema    Laboratory:     Lab Results   Component Value Date/Time     02/01/2023 12:46 PM     01/25/2023 02:57 PM    K 4.1 02/01/2023 12:46 PM    K 3.9 01/25/2023 02:57 PM     02/01/2023 12:46 PM     01/25/2023 02:57 PM    CO2 28 02/01/2023 12:46 PM    CO2 30 01/25/2023 02:57 PM    BUN 19 02/01/2023 12:46 PM    BUN 18 01/25/2023 02:57 PM    GLOB 3.4 02/01/2023 12:46 PM    GLOB 3.2 01/25/2023 02:57 PM    ALT 30 02/01/2023 12:46 PM    ALT 30 01/25/2023 02:57 PM     Lab Results   Component Value Date/Time    WBC 4.4 02/01/2023 12:46 PM    WBC 4.7 01/25/2023 02:57 PM    HGB 13.8 02/01/2023 12:46 PM    HGB 14.1 01/25/2023 02:57 PM    HCT 40.5 02/01/2023 12:46 PM    HCT 41.6 01/25/2023 02:57 PM     02/01/2023 12:46 PM     01/25/2023 02:57 PM     No results found for: APTT, INR    Assessment:     Malignant melanoma    [unfilled]    Plan:     Planned Procedure:  port placement    Risks, benefits, and alternatives reviewed with patient and he agrees to proceed with the procedure.       Signed By: Lilia Stevens PA-C     February 10, 2023

## 2023-02-10 NOTE — ADDENDUM NOTE
Addendum  created 02/10/23 1430 by Josette Kussmaul, APRN - CRNA    Flowsheet accepted, Flowsheet data copied forward, Intraprocedure Flowsheets edited

## 2023-02-10 NOTE — ANESTHESIA POSTPROCEDURE EVALUATION
Department of Anesthesiology  Postprocedure Note    Patient: Carmita Fox  MRN: [id number]  [place]: 1945  Date of evaluation: 2/10/2023      Procedure Summary     Date: 02/10/23 Room / Location: [facility]    Anesthesia Start: 3050 Anesthesia Stop: 8501    Procedure: IR PORT PLACEMENT EQUAL OR GREATER THAN 5 YEARS Diagnosis:       Melanoma of right posterior calf ([address]      High risk medication use    Scheduled Providers: Nathan De Souza MD; Josette Kussmaul, APRN - CRNA Responsible Provider: Nathan De Souza MD    Anesthesia Type: TIVA ASA Status: 3          Anesthesia Type: No value filed.     Stephen Phase I: Stephen Score: 10    Stephen Phase II:        Anesthesia Post Evaluation    Patient location during evaluation: PACU  Patient participation: complete - patient participated  Level of consciousness: awake and alert  Airway patency: patent  Nausea & Vomiting: no nausea  Cardiovascular status: hemodynamically stable  Respiratory status: acceptable  Hydration status: euvolemic

## 2023-02-21 DIAGNOSIS — C61 PROSTATE CANCER (HCC): Primary | ICD-10-CM

## 2023-02-23 ENCOUNTER — HOSPITAL ENCOUNTER (OUTPATIENT)
Dept: INFUSION THERAPY | Age: 78
Discharge: HOME | End: 2023-02-23
Payer: MEDICARE

## 2023-02-23 ENCOUNTER — OFFICE VISIT (OUTPATIENT)
Dept: ONCOLOGY | Age: 78
End: 2023-02-23
Payer: MEDICARE

## 2023-02-23 VITALS
WEIGHT: 182.5 LBS | BODY MASS INDEX: 27.03 KG/M2 | SYSTOLIC BLOOD PRESSURE: 137 MMHG | HEART RATE: 88 BPM | RESPIRATION RATE: 16 BRPM | HEIGHT: 69 IN | DIASTOLIC BLOOD PRESSURE: 83 MMHG | TEMPERATURE: 98 F | OXYGEN SATURATION: 97 %

## 2023-02-23 DIAGNOSIS — C43.71 MELANOMA OF RIGHT POSTERIOR CALF (HCC): Primary | ICD-10-CM

## 2023-02-23 DIAGNOSIS — C43.71 MELANOMA OF RIGHT POSTERIOR CALF (HCC): ICD-10-CM

## 2023-02-23 DIAGNOSIS — C61 PROSTATE CANCER (HCC): ICD-10-CM

## 2023-02-23 DIAGNOSIS — D64.9 ANEMIA, UNSPECIFIED TYPE: ICD-10-CM

## 2023-02-23 DIAGNOSIS — Z79.899 HIGH RISK MEDICATION USE: ICD-10-CM

## 2023-02-23 LAB
ALBUMIN SERPL-MCNC: 3.9 G/DL (ref 3.2–4.6)
ALBUMIN/GLOB SERPL: 1.1 (ref 0.4–1.6)
ALP SERPL-CCNC: 93 U/L (ref 50–136)
ALT SERPL-CCNC: 31 U/L (ref 12–65)
ANION GAP SERPL CALC-SCNC: 2 MMOL/L (ref 2–11)
AST SERPL-CCNC: 26 U/L (ref 15–37)
BASOPHILS # BLD: 0 K/UL (ref 0–0.2)
BASOPHILS NFR BLD: 1 % (ref 0–2)
BILIRUB SERPL-MCNC: 0.5 MG/DL (ref 0.2–1.1)
BUN SERPL-MCNC: 12 MG/DL (ref 8–23)
CALCIUM SERPL-MCNC: 9.4 MG/DL (ref 8.3–10.4)
CHLORIDE SERPL-SCNC: 108 MMOL/L (ref 101–110)
CO2 SERPL-SCNC: 30 MMOL/L (ref 21–32)
CREAT SERPL-MCNC: 0.8 MG/DL (ref 0.8–1.5)
DIFFERENTIAL METHOD BLD: ABNORMAL
EOSINOPHIL # BLD: 0.2 K/UL (ref 0–0.8)
EOSINOPHIL NFR BLD: 4 % (ref 0.5–7.8)
ERYTHROCYTE [DISTWIDTH] IN BLOOD BY AUTOMATED COUNT: 12.4 % (ref 11.9–14.6)
GLOBULIN SER CALC-MCNC: 3.5 G/DL (ref 2.8–4.5)
GLUCOSE SERPL-MCNC: 79 MG/DL (ref 65–100)
HCT VFR BLD AUTO: 37.5 % (ref 41.1–50.3)
HGB BLD-MCNC: 12.8 G/DL (ref 13.6–17.2)
IMM GRANULOCYTES # BLD AUTO: 0 K/UL (ref 0–0.5)
IMM GRANULOCYTES NFR BLD AUTO: 0 % (ref 0–5)
LYMPHOCYTES # BLD: 0.8 K/UL (ref 0.5–4.6)
LYMPHOCYTES NFR BLD: 17 % (ref 13–44)
MCH RBC QN AUTO: 33.7 PG (ref 26.1–32.9)
MCHC RBC AUTO-ENTMCNC: 34.1 G/DL (ref 31.4–35)
MCV RBC AUTO: 98.7 FL (ref 82–102)
MONOCYTES # BLD: 0.6 K/UL (ref 0.1–1.3)
MONOCYTES NFR BLD: 12 % (ref 4–12)
NEUTS SEG # BLD: 3.3 K/UL (ref 1.7–8.2)
NEUTS SEG NFR BLD: 66 % (ref 43–78)
NRBC # BLD: 0 K/UL (ref 0–0.2)
PLATELET # BLD AUTO: 174 K/UL (ref 150–450)
PMV BLD AUTO: 8.9 FL (ref 9.4–12.3)
POTASSIUM SERPL-SCNC: 3.7 MMOL/L (ref 3.5–5.1)
PROT SERPL-MCNC: 7.4 G/DL (ref 6.3–8.2)
PSA SERPL-MCNC: 4.4 NG/ML
RBC # BLD AUTO: 3.8 M/UL (ref 4.23–5.6)
SODIUM SERPL-SCNC: 140 MMOL/L (ref 133–143)
WBC # BLD AUTO: 4.9 K/UL (ref 4.3–11.1)

## 2023-02-23 PROCEDURE — 84153 ASSAY OF PSA TOTAL: CPT

## 2023-02-23 PROCEDURE — 96413 CHEMO IV INFUSION 1 HR: CPT

## 2023-02-23 PROCEDURE — 80053 COMPREHEN METABOLIC PANEL: CPT

## 2023-02-23 PROCEDURE — 2580000003 HC RX 258: Performed by: INTERNAL MEDICINE

## 2023-02-23 PROCEDURE — 1123F ACP DISCUSS/DSCN MKR DOCD: CPT | Performed by: INTERNAL MEDICINE

## 2023-02-23 PROCEDURE — 85025 COMPLETE CBC W/AUTO DIFF WBC: CPT

## 2023-02-23 PROCEDURE — 36591 DRAW BLOOD OFF VENOUS DEVICE: CPT

## 2023-02-23 PROCEDURE — 6360000002 HC RX W HCPCS: Performed by: INTERNAL MEDICINE

## 2023-02-23 PROCEDURE — 99215 OFFICE O/P EST HI 40 MIN: CPT | Performed by: INTERNAL MEDICINE

## 2023-02-23 RX ORDER — SODIUM CHLORIDE 9 MG/ML
5-40 INJECTION INTRAVENOUS PRN
Status: DISCONTINUED | OUTPATIENT
Start: 2023-02-23 | End: 2023-02-24 | Stop reason: HOSPADM

## 2023-02-23 RX ORDER — SODIUM CHLORIDE 9 MG/ML
5-250 INJECTION, SOLUTION INTRAVENOUS PRN
Status: DISCONTINUED | OUTPATIENT
Start: 2023-02-23 | End: 2023-02-24 | Stop reason: HOSPADM

## 2023-02-23 RX ORDER — SODIUM CHLORIDE 0.9 % (FLUSH) 0.9 %
10 SYRINGE (ML) INJECTION PRN
Status: DISCONTINUED | OUTPATIENT
Start: 2023-02-23 | End: 2023-02-24 | Stop reason: HOSPADM

## 2023-02-23 RX ORDER — FINASTERIDE 5 MG/1
TABLET, FILM COATED ORAL
COMMUNITY
Start: 2023-02-21

## 2023-02-23 RX ORDER — TAMSULOSIN HYDROCHLORIDE 0.4 MG/1
0.4 CAPSULE ORAL DAILY
COMMUNITY
Start: 2023-02-10

## 2023-02-23 RX ADMIN — SODIUM CHLORIDE, PRESERVATIVE FREE 10 ML: 5 INJECTION INTRAVENOUS at 16:45

## 2023-02-23 RX ADMIN — SODIUM CHLORIDE, PRESERVATIVE FREE 10 ML: 5 INJECTION INTRAVENOUS at 15:35

## 2023-02-23 RX ADMIN — SODIUM CHLORIDE 200 MG: 9 INJECTION, SOLUTION INTRAVENOUS at 16:06

## 2023-02-23 RX ADMIN — SODIUM CHLORIDE 100 ML/HR: 9 INJECTION, SOLUTION INTRAVENOUS at 15:35

## 2023-02-23 RX ADMIN — SODIUM CHLORIDE, PRESERVATIVE FREE 10 ML: 5 INJECTION INTRAVENOUS at 14:44

## 2023-02-23 ASSESSMENT — PATIENT HEALTH QUESTIONNAIRE - PHQ9
SUM OF ALL RESPONSES TO PHQ QUESTIONS 1-9: 0
2. FEELING DOWN, DEPRESSED OR HOPELESS: 0
1. LITTLE INTEREST OR PLEASURE IN DOING THINGS: 0
SUM OF ALL RESPONSES TO PHQ QUESTIONS 1-9: 0
SUM OF ALL RESPONSES TO PHQ QUESTIONS 1-9: 0
SUM OF ALL RESPONSES TO PHQ9 QUESTIONS 1 & 2: 0
SUM OF ALL RESPONSES TO PHQ QUESTIONS 1-9: 0

## 2023-02-23 NOTE — PROGRESS NOTES
[facility] Hematology & Oncology: Office Visit Progress Note    Chief Complaint:    Melanoma of right posterior calf pT3bN1    History of Present Illness:  Reason for Referral: Melanoma of right posterior calf     Referring Provider: Avila Montelongo MD     Primary Care Provider: Heather Knapp MD     Family History of Cancer/Hematologic Disorders: Family history is significant for mother with cervical cancer. Presenting Symptoms: Enlarging skin lesion to right calf     Mr. Beatrice Garsia is a 65 y.o. white male with a history of tobacco use (former smoker), atrial fibrillation, cardioversion, HLD, HTN, cataract extraction, ED, penile prosthesis, prostate cancer, elevated MCV, B12 deficiency, IFG, right optic neuritis, kidney stone, and vertigo, who was recently diagnosed with melanoma of the right posterior calf. A shave biopsy of a lesion to patient's right distal calf done by Dermatologist, Dr. Debra Castelan, on 8/24/22 identified a malignant melanoma, Breslow 3.4 mm; IV; +ulceration, +regression; 20 mitosis/ sq mm; no microscopic satellitosis; no LVI. The lesion had developed over several months and was getting larger. Patient was referred to Surgeon, Dr. Negar Acuna, and evaluated in consultation on 9/21/22. He was assessed with signs and symptoms of a pT3bN0 right calf malignant melanoma and recommended for wide excision with split-thickness skin grafting and sentinel node excision. He was taken to the OR on 10/18/22 and underwent radical resection of soft tissue of right posterior calf for invasive melanoma; right inguinofemoral sentinel node excision x 2 following intraoperative mapping and identification; split-thickness skin grafting from right anterior thigh to right posterior calf; and advancement flap closure of right thigh graft harvest site. Two right inguinofemoral nodes and wide excision of right posterior calf was sent to Pathology for review.  Final pathology revealed malignant melanoma confined to dermis and subcutaneous tissue, measuring 3.9 mm in depth, Jose C's level 5. Tumor was within less than 1 mm of the deep margin and greater than 5 mm from all peripheral margins. Separate focus of melanoma in situ at the area was marked by suture. Margins were uninvolved. Metastatic melanoma was present in the right inguinofemoral sentinel node 2, and the right inguinofemoral sentinel node 1 was benign. Chest x-ray on 10/24/22 showed no acute cardiopulmonary abnormality. PET CT on 12/7/22 demonstrated a region of uptake in the right mid-calf consistent with postsurgical changes related to recent melanoma resection; status post right inguinal lymph node dissection; indeterminate dermal-based nodular focus more laterally in the anterior proximal thigh subcutaneous tissues with mild elevation of FDG uptake; no evidence of metastatic disease in the head, neck, or chest; and focus of asymmetric elevated uptake in the right prostate. Radiologist recommended correlating with PSA and considering prostate MRI. During post-op follow-up on 12/28/22, Dr. Rosalind Brian notes, Annika Mcleod discussed inguinofemoral node dissection given his N1 node status vs serial ultrasounds and observation. He elects serial ultrasounds and observation. I referred him to medical oncology for adjuvant treatment options.  Dr. Rosalind Brain also notes that patient has already been diagnosed with prostate carcinoma and is considering his options with Urology. He is followed by Urologist, Dr. Marielos Starr at [facility]. He initially presented with an elevated PSA of 5.51 on 9/9/16. He was referred to Urologist, Dr. Joao Sauer, for further management. He opted to proceed with active surveillance at that time and continue to monitor PSA. He established with Dr. Radha Zurita on 9/22/22 in consultation for his elevated PSA. Dr. Joao Sauer had recommended an MRI prostate be performed.  However, MRI prostate was not obtained due to penile prothesis implant that patient has in place per Dr. Jazmyne Paul report and MRI compatibility was unknown. Per patient, this was placed about 40 years ago. His PSA began to increase and was 10.939 as of 9/22/22. Since an MRI was unable to be obtained, Dr. Chente Max recommended proceeding with prostate biopsy due to persistent elevated PSA. Prostate biopsy with Dr. Chente Max on 10/3/22 revealed Dacoma 3+4, grade group 2 adenocarcinoma in 4 specimens and Helen 3+3, grade group 1 adenocarcinoma in 5 specimens (9/12 specimens involved overall). He met with Dr. Britton Huitron on 12/14/22 to discuss options for his prostate cancer. He declined ADT due to toxicity concerns. He was referred to Radiation Oncology and evaluated in consultation by Radiation Oncologist, Dr. Giselle Jones, at [facility] on 1/12/23. Radiation treatment options were discussed, and patient requested time to further consider the options of prostatectomy versus radiation before making decision on how to proceed. PATHOLOGY REPORT 8/24/22     SURGICAL PATHOLOGY REPORT 10/3/22  Final Diagnosis    Pending sale to [facility]   A.  Prostate, right base, core biopsy:  Prostatic adenocarcinoma, Dacoma score 3+3 = 6 (grade Group 1)  Tumor involves 10% of core     B. Prostate, right mid, core biopsy:  Prostatic adenocarcinoma, Dacoma score 3+4 = 7 (grade group 2)  Percent pattern 4: 20%. Tumor involves 10% of core. C.  Prostate, right apex, core biopsy:  Prostatic adenocarcinoma, Helen score 3+3 = 6 (grade Group 1). Tumor involves 20% of core. B.  Prostate, right lateral base, core biopsy:  Prostatic adenocarcinoma, Dacoma score 3+3 = 6 (grade Group 1). Tumor involves 30% of core. E.  Prostate, right lateral mid, core biopsy:  Prostatic adenocarcinoma, Dacoma score 3+3 = 6 (grade Group 1). Tumor involves 50% of core. F.  Prostate, right lateral apex, core biopsy:  Prostatic adenocarcinoma, Helen score 3+3 = 6 (grade Group 1).   Tumor involves 20% of core.     G-I. Prostate-left base, left mid, left apex-core biopsies:  Benign prostate tissue. J.  Prostate, left lateral base, core biopsy:  Prostatic adenocarcinoma, Garrett score 3+4 = 7 (grade group 2). Percent pattern 4: 10%. Tumor involves 75% of core. K.  Prostate, left lateral mid, core biopsy:  Prostatic adenocarcinoma, Helen score 3+4 = 7 (grade group 2)  Percent pattern 4: 30%. Tumor involves 80%. L.  Prostate, left lateral apex, core biopsy:  Prostatic adenocarcinoma, Helen score 3+4 = 7 (grade group 2)  Percent pattern 4: 30%. Tumor involves 70% of core. [facility] SURGICAL PATHOLOGY REPORT 10/18/22  DIAGNOSIS   A: RIGHT INGUINOFEMORAL SENTINEL NODE 2: METASTATIC MELANOMA PRESENT. B: WIDE LOCAL EXCISION RIGHT POSTERIOR CALF: MALIGNANT MELANOMA CONFINED TO DERMIS AND SUBCUTANEOUS TISSUE, 3.9 MM IN DEPTH, DELORES'S LEVEL 5, TUMOR WITHIN LESS THAN 1 MM OF THE DEEP MARGIN, GREATER THAN 5 MM FROM ALL PERIPHERAL MARGINS. SEPARATE FOCUS OF MELANOMA IN SITU AT AREA MARKED BY SUTURE, MARGINS UNINVOLVED. C: RIGHT INGUINOFEMORAL SENTINEL NODE 1: BENIGN LYMPH NODE, NEGATIVE FOR TUMOR. XR CHEST (2 VW) 10/24/2022  FINDINGS:   Support Devices:   *  None  Cardiac Silhouette: Within normal limits in size. Mediastinum: Normal mediastinal contours. Lungs: No airspace consolidation. No pneumothorax or sizable pleural effusion. Upper Abdomen: Normal    Miscellaneous: No fracture or suspicious osseous lesion. IMPRESSION: No acute cardiopulmonary abnormality. WHOLE-BODY PET/CT  12/7/22  FINDINGS:  Head and Neck: No enlarged or radiotracer avid lymph nodes. Chest: No discrete lung nodule. No mediastinal mass or lymphadenopathy. Multivessel coronary atherosclerotic calcification. Aortic atherosclerotic calcifications without aneurysmal dilation. Abdomen/Pelvis: No enlarged radiotracer avid lymph nodes in the abdomen or pelvis.  Postsurgical changes from a right inguinal lymph node dissection. More laterally in the anterior proximal thighs tiny nodular focus with slight elevation of FDG uptake, maximum SUV 2.6. Penile prosthesis noted. There is a focus of asymmetric uptake in the right prostate. No aggressive appearing bone lesions. Small region of elevated tracer uptake in right mid calf, maximum SUV 5.1. IMPRESSION:  1. Region of uptake in right mid calf consistent with postsurgical changes related to recent melanoma resection. 2.  Status post right inguinal lymph node dissection. Indeterminate dermal-based nodular focus more laterally in the anterior proximal thigh subcutaneous tissues with mild elevation of FDG uptake. 3.  No evidence of metastatic disease in the head, neck, or chest.  4.  Focus of asymmetric elevated uptake in the right prostate. Recommend correlating with PSA and consider prostate MRI. Review of Systems:  Constitutional Denies fever or chills. Denies weight loss or appetite changes. Denies fatigue. Denies anorexia. HEENT Denies trauma, bluring vision, hearing loss, ear pain, nosebleeds, sore throat, neck pain and ear discharge. Skin Denies lesions or rashes. Lungs Denies shortness of breath, cough, sputum production or hemoptysis. Cardiovascular Denies chest pain, palpitations, orthopnea, claudication and leg swelling. Gastrointestinal Denies nausea, vomiting, bowel changes. Denies bloody or black stools. Denies abdominal pain.  Denies dysuria, frequency or hesitancy of urination   Neuro Denies headaches, visual changes or ataxia. Denies dizziness, tingling, tremors, sensory change, speech change, focal weakness and headaches. Hematology Denies nasal/gum bleeding, denies easy bruise   Endo Denies heat/cold intolerance, denies diabetes. MSK Denies back pain, swollen legs, myalgias and falls. Psychiatric/Behavioral Denies depression and substance abuse. The patient is not nervous/anxious.        Allergies Allergen Reactions    Lisinopril Cough     Past Medical History:   Diagnosis Date    History of cardioversion 09/01/2022    converted then a fib returned 2 weeks    Hyperlipidemia     Hypertension     New onset a-fib ([address] 08/17/2022    cardioversion @ [place] 9/1/22-Afib returned 2 weeks later- Eliquis & Metoprolol- [facility]     Past Surgical History:   Procedure Laterality Date    CARDIOVERSION  09/01/2022    & [facility]    CATARACT EXTRACTION      IR PORT PLACEMENT EQUAL OR GREATER THAN 5 YEARS  2/10/2023    IR PORT PLACEMENT EQUAL OR GREATER THAN 5 YEARS 2/10/2023 [facility] RADIOLOGY SPECIALS    LEG BIOPSY EXCISION Right 10/18/2022    wide local excision right posterior calf melanoma with performed by Brendan Linn MD at [facility] MAIN OR    LYMPH NODE BIOPSY Right 10/18/2022    SENTINEL LYMPH NODE EXCISION RIGHT INGUINAL performed by Brendan Linn MD at [address] [place] [address] Right 10/18/2022    SPLIT THICKNESS SKIN GRAFT, RIGHT LEG performed by Brendan Linn MD at [facility] MAIN OR     Family History   Problem Relation Age of Onset    Cancer Mother      Social History     Socioeconomic History    Marital status:      Spouse name: Not on file    Number of children: Not on file    Years of education: Not on file    Highest education level: Not on file   Occupational History    Not on file   Tobacco Use    Smoking status: Never    Smokeless tobacco: Never   Vaping Use    Vaping Use: Never used   Substance and Sexual Activity    Alcohol use: Not Currently    Drug use: Not Currently    Sexual activity: Not on file   Other Topics Concern    Not on file   Social History Narrative    Not on file     Social Determinants of Health     Financial Resource Strain: Not on file   Food Insecurity: Not on file   Transportation Needs: Not on file   Physical Activity: Not on file   Stress: Not on file   Social Connections: Not on file   Intimate Partner Violence: Not on file   Housing Stability: Not on file     Current Outpatient Medications   Medication Sig Dispense Refill    tamsulosin (FLOMAX) 0.4 MG capsule 0.4 mg daily      finasteride (PROSCAR) 5 MG tablet       losartan-hydroCHLOROthiazide (HYZAAR) 100-25 MG per tablet Take 1 tablet by mouth daily      apixaban (ELIQUIS) 5 MG TABS tablet Take 5 mg by mouth 2 times daily Hold 48 hours prior to surgery per medication hold clearance / [facility]      atorvastatin (LIPITOR) 20 MG tablet Take 20 mg by mouth every morning      cetirizine (ZYRTEC) 10 MG tablet Take 10 mg by mouth daily      metoprolol tartrate (LOPRESSOR) 25 MG tablet Take 25 mg by mouth 2 times daily      Multiple Vitamin (MULTIVITAMINS PO) Take 1 tablet by mouth daily       No current facility-administered medications for this visit. Facility-Administered Medications Ordered in Other Visits   Medication Dose Route Frequency Provider Last Rate Last Admin    sodium chloride flush 0.9 % injection 10 mL  10 mL IntraVENous PRN Aubrey Hernandez MD   10 mL at 02/23/23 1444       OBJECTIVE:  /83 (Site: Left Upper Arm, Position: Sitting, Cuff Size: Medium Adult)   Pulse 88   Temp 98 °F (36.7 °C) (Oral)   Resp 16   Ht 5' 9.29\" (1.76 m)   Wt 182 lb 8 oz (82.8 kg)   SpO2 97%   BMI 26.73 kg/m²     Physical Exam:  Constitutional: Oriented to person, place, and time. Well-developed and well-nourished. HEENT: Normocephalic and atraumatic. Oropharynx is clear and moist.   Conjunctivae and EOM are normal. Pupils are equal, round, and reactive to light. No scleral icterus. Neck supple. No JVD present. No tracheal deviation present. No thyromegaly present. Lymph node No palpable submandibular, cervical, supraclavicular, axillary and inguinal lymph nodes. Skin Right groin and calf surgical scar/graft. Warm and dry. No bruising and no rash noted. No erythema. No pallor. Respiratory Effort normal and breath sounds normal.  No respiratory distress. No wheezes.   No rales. No tenderness. CVS Normal rate, regular rhythm and normal heart sounds. Exam reveals no gallop, no friction and no rub. No murmur heard. Abdomen Soft. Bowel sounds are normal. Exhibits no distension. There is no tenderness. There is no rebound and no guarding. Neuro Normal reflexes. No cranial nerve deficit. Exhibits normal muscle tone, 5 of 5 strength of all extremities. MSK Normal range of motion in general.  No edema and no tenderness.    Psych Normal mood, affect, behavior, judgment and thought content      Labs:  Recent Results (from the past 24 hour(s))   CBC with Auto Differential    Collection Time: 02/23/23  2:36 PM   Result Value Ref Range    WBC 4.9 4.3 - 11.1 K/uL    RBC 3.80 (L) 4.23 - 5.6 M/uL    Hemoglobin 12.8 (L) 13.6 - 17.2 g/dL    Hematocrit 37.5 (L) 41.1 - 50.3 %    MCV 98.7 82.0 - 102.0 FL    MCH 33.7 (H) 26.1 - 32.9 PG    MCHC 34.1 31.4 - 35.0 g/dL    RDW 12.4 11.9 - 14.6 %    Platelets 738 275 - 653 K/uL    MPV 8.9 (L) 9.4 - 12.3 FL    nRBC 0.00 0.0 - 0.2 K/uL    Seg Neutrophils 66 43 - 78 %    Lymphocytes 17 13 - 44 %    Monocytes 12 4.0 - 12.0 %    Eosinophils % 4 0.5 - 7.8 %    Basophils 1 0.0 - 2.0 %    Immature Granulocytes 0 0.0 - 5.0 %    Segs Absolute 3.3 1.7 - 8.2 K/UL    Absolute Lymph # 0.8 0.5 - 4.6 K/UL    Absolute Mono # 0.6 0.1 - 1.3 K/UL    Absolute Eos # 0.2 0.0 - 0.8 K/UL    Basophils Absolute 0.0 0.0 - 0.2 K/UL    Absolute Immature Granulocyte 0.0 0.0 - 0.5 K/UL    Differential Type AUTOMATED     Comprehensive Metabolic Panel    Collection Time: 02/23/23  2:36 PM   Result Value Ref Range    Sodium 140 133 - 143 mmol/L    Potassium 3.7 3.5 - 5.1 mmol/L    Chloride 108 101 - 110 mmol/L    CO2 30 21 - 32 mmol/L    Anion Gap 2 2 - 11 mmol/L    Glucose 79 65 - 100 mg/dL    BUN 12 8 - 23 MG/DL    Creatinine 0.80 0.8 - 1.5 MG/DL    Est, Glom Filt Rate >60 >60 ml/min/1.73m2    Calcium 9.4 8.3 - 10.4 MG/DL    Total Bilirubin 0.5 0.2 - 1.1 MG/DL    ALT 31 12 - 65 U/L    AST 26 15 - 37 U/L    Alk Phosphatase 93 50 - 136 U/L    Total Protein 7.4 6.3 - 8.2 g/dL    Albumin 3.9 3.2 - 4.6 g/dL    Globulin 3.5 2.8 - 4.5 g/dL    Albumin/Globulin Ratio 1.1 0.4 - 1.6     PSA, Diagnostic    Collection Time: 02/23/23  2:36 PM   Result Value Ref Range    PSA 4.4 (H) <4.0 ng/mL       Imaging:  No results found for this or any previous visit. ASSESSMENT/PLAN:   Diagnosis Orders   1. Melanoma of right posterior calf (HCC)  CBC with Auto Differential    Comprehensive Metabolic Panel    PSA, Diagnostic      2. Prostate cancer ([facility] [address]        3. High risk medication use        4. Anemia, unspecified type              65 y.o. M consulted for right leg melanoma presented to [facility] on 1/25/2023. He was found of the right calf melanoma and Dr. Brad Baires performed the resection with sentinel lymph node dissection on 10/18/2022, final pathology showed T3BN1 melanoma with ulceration, healed well and we discussed that the indication of adjuvant immunotherapy to increase chance of cure, patient very interested and arranged to start [address] next week, patient will determine whether he wants to have a port placed, is also diagnosed with prostate cancer Helen score 7 pending definitive radiation therapy in [place], study has shown that [address] and radiation are compatible without significant increase of toxicity, educated for risk and management, arrange for authorization and started adjuvant [address] from 2/1/2023, return on 2/23/2023, generally tolerated treatment well, hemoglobin decreased likely related to prostate cancer radiation at [facility] ongoing, proceed to cycle 2 to [address], Zofran as needed, return in 3 weeks for cycle 3 but call as needed. All questions are answered to their satisfaction. They will call for further questions and concerns. ECOG PERFORMANCE STATUS - 0-Fully active, able to carry on all pre-disease performance without restriction. Pain - 0 - No pain/10. None/Minimal pain - not affecting QOL     Fatigue - No flowsheet data found. Distress - No flowsheet data found. Elements of this note have been dictated via voice recognition software. Text and phrases may be limited by the accuracy and autoconversion of the software. The chart has been reviewed, but errors may still be present. Shana Souza M.D.   [address]  Office : [phone]  Fax : [phone]

## 2023-02-23 NOTE — PATIENT INSTRUCTIONS
Patient Instructions from Today's Visit    Reason for Visit:  Follow up lung    Diagnosis Information:  https://[email or web address]/. net/about-us/asco-answers-patient-education-materials/fhqy-hplaphs-kudn-sheets      Plan:  Keytruda today    Follow Up:  3 weeks    Recent Lab Results:  Hospital Outpatient Visit on 02/23/2023   Component Date Value Ref Range Status    WBC 02/23/2023 4.9  4.3 - 11.1 K/uL Final    RBC 02/23/2023 3.80 (A)  4.23 - 5.6 M/uL Final    Hemoglobin 02/23/2023 12.8 (A)  13.6 - 17.2 g/dL Final    Hematocrit 02/23/2023 37.5 (A)  41.1 - 50.3 % Final    MCV 02/23/2023 98.7  82.0 - 102.0 FL Final    MCH 02/23/2023 33.7 (A)  26.1 - 32.9 PG Final    MCHC 02/23/2023 34.1  31.4 - 35.0 g/dL Final    RDW 02/23/2023 12.4  11.9 - 14.6 % Final    Platelets 64/78/2054 174  150 - 450 K/uL Final    MPV 02/23/2023 8.9 (A)  9.4 - 12.3 FL Final    nRBC 02/23/2023 0.00  0.0 - 0.2 K/uL Final    **Note: Absolute NRBC parameter is now reported with Hemogram**    Seg Neutrophils 02/23/2023 66  43 - 78 % Final    Lymphocytes 02/23/2023 17  13 - 44 % Final    Monocytes 02/23/2023 12  4.0 - 12.0 % Final    Eosinophils % 02/23/2023 4  0.5 - 7.8 % Final    Basophils 02/23/2023 1  0.0 - 2.0 % Final    Immature Granulocytes 02/23/2023 0  0.0 - 5.0 % Final    Segs Absolute 02/23/2023 3.3  1.7 - 8.2 K/UL Final    Absolute Lymph # 02/23/2023 0.8  0.5 - 4.6 K/UL Final    Absolute Mono # 02/23/2023 0.6  0.1 - 1.3 K/UL Final    Absolute Eos # 02/23/2023 0.2  0.0 - 0.8 K/UL Final    Basophils Absolute 02/23/2023 0.0  0.0 - 0.2 K/UL Final    Absolute Immature Granulocyte 02/23/2023 0.0  0.0 - 0.5 K/UL Final    Differential Type 02/23/2023 AUTOMATED    Final    Sodium 02/23/2023 140  133 - 143 mmol/L Final    Potassium 02/23/2023 3.7  3.5 - 5.1 mmol/L Final    Chloride 02/23/2023 108  101 - 110 mmol/L Final    CO2 02/23/2023 30  21 - 32 mmol/L Final    Anion Gap 02/23/2023 2  2 - 11 mmol/L Final    Glucose 02/23/2023 79  65 - 100 mg/dL Final BUN 02/23/2023 12  8 - 23 MG/DL Final    Creatinine 02/23/2023 0.80  0.8 - 1.5 MG/DL Final    Est, Glom Filt Rate 02/23/2023 >60  >60 ml/min/1.73m2 Final    Comment:      Pediatric calculator link: [employer].at. org/professionals/kdoqi/gfr_calculatorped       These results are not intended for use in patients <25years of age. eGFR results are calculated without a race factor using  the 2021 CKD-EPI equation. Careful clinical correlation is recommended, particularly when comparing to results calculated using previous equations. The CKD-EPI equation is less accurate in patients with extremes of muscle mass, extra-renal metabolism of creatinine, excessive creatine ingestion, or following therapy that affects renal tubular secretion. Calcium 02/23/2023 9.4  8.3 - 10.4 MG/DL Final    Total Bilirubin 02/23/2023 0.5  0.2 - 1.1 MG/DL Final    ALT 02/23/2023 31  12 - 65 U/L Final    AST 02/23/2023 26  15 - 37 U/L Final    Alk Phosphatase 02/23/2023 93  50 - 136 U/L Final    Total Protein 02/23/2023 7.4  6.3 - 8.2 g/dL Final    Albumin 02/23/2023 3.9  3.2 - 4.6 g/dL Final    Globulin 02/23/2023 3.5  2.8 - 4.5 g/dL Final    Albumin/Globulin Ratio 02/23/2023 1.1  0.4 - 1.6   Final    PSA 02/23/2023 4.4 (A)  <4.0 ng/mL Final    Comment: [employer]. New method in use, please reestablish patient baseline  [employer] [place] LOCI technology. Patient's results of tumor marker testing may not be comparable to labs using different manufacturers/methods.            Treatment Summary has been discussed and given to patient: n/a        -------------------------------------------------------------------------------------------------------------------  Please call our office at [phone] if you have any  of the following symptoms:   Fever of 100.5 or greater  Chills  Shortness of breath  Swelling or pain in one leg    After office hours an answering service is available and will contact a provider for emergencies or if you are experiencing any of the above symptoms. Patient does express an interest in My Chart. My Chart log in information explained on the after visit summary printout at the [facility] desk.     Warren Flower RN

## 2023-02-23 NOTE — PROGRESS NOTES
Patient arrived to port lab for port access and lab draw   [address] accessed and labs drawn per protocol   *Port remains accessed   Patient discharged from port lab ambulatory*

## 2023-02-24 NOTE — PROGRESS NOTES
Arrived to the [facility]placed by [facility]. Keytruda completed. Patient tolerated without difficulty. Any issues or concerns during appointment: None. Patient aware of next infusion appointment on 03/16 (date) at 80 (time). Patient instructed to call provider with temperature of 100.4 or greater or nausea/vomiting/ diarrhea or pain not controlled by medications  Discharged ambulatory.

## 2023-03-16 ENCOUNTER — OFFICE VISIT (OUTPATIENT)
Dept: ONCOLOGY | Age: 78
End: 2023-03-16
Payer: MEDICARE

## 2023-03-16 ENCOUNTER — HOSPITAL ENCOUNTER (OUTPATIENT)
Dept: INFUSION THERAPY | Age: 78
Discharge: HOME | End: 2023-03-16
Payer: MEDICARE

## 2023-03-16 VITALS
WEIGHT: 178.4 LBS | OXYGEN SATURATION: 98 % | TEMPERATURE: 98.1 F | HEIGHT: 69 IN | RESPIRATION RATE: 16 BRPM | DIASTOLIC BLOOD PRESSURE: 68 MMHG | SYSTOLIC BLOOD PRESSURE: 103 MMHG | BODY MASS INDEX: 26.42 KG/M2 | HEART RATE: 109 BPM

## 2023-03-16 DIAGNOSIS — C43.71 MELANOMA OF RIGHT POSTERIOR CALF (HCC): ICD-10-CM

## 2023-03-16 DIAGNOSIS — C43.71 MELANOMA OF RIGHT POSTERIOR CALF (HCC): Primary | ICD-10-CM

## 2023-03-16 DIAGNOSIS — Z79.899 HIGH RISK MEDICATION USE: ICD-10-CM

## 2023-03-16 DIAGNOSIS — C61 PROSTATE CANCER (HCC): ICD-10-CM

## 2023-03-16 DIAGNOSIS — R60.0 LOWER EXTREMITY EDEMA: ICD-10-CM

## 2023-03-16 LAB
ALBUMIN SERPL-MCNC: 3.8 G/DL (ref 3.2–4.6)
ALBUMIN/GLOB SERPL: 1.2 (ref 0.4–1.6)
ALP SERPL-CCNC: 92 U/L (ref 50–136)
ALT SERPL-CCNC: 35 U/L (ref 12–65)
ANION GAP SERPL CALC-SCNC: 5 MMOL/L (ref 2–11)
AST SERPL-CCNC: 32 U/L (ref 15–37)
BASOPHILS # BLD: 0 K/UL (ref 0–0.2)
BASOPHILS NFR BLD: 1 % (ref 0–2)
BILIRUB SERPL-MCNC: 0.5 MG/DL (ref 0.2–1.1)
BUN SERPL-MCNC: 18 MG/DL (ref 8–23)
CALCIUM SERPL-MCNC: 9.7 MG/DL (ref 8.3–10.4)
CHLORIDE SERPL-SCNC: 106 MMOL/L (ref 101–110)
CO2 SERPL-SCNC: 29 MMOL/L (ref 21–32)
CREAT SERPL-MCNC: 0.8 MG/DL (ref 0.8–1.5)
DIFFERENTIAL METHOD BLD: ABNORMAL
EOSINOPHIL # BLD: 0.2 K/UL (ref 0–0.8)
EOSINOPHIL NFR BLD: 5 % (ref 0.5–7.8)
ERYTHROCYTE [DISTWIDTH] IN BLOOD BY AUTOMATED COUNT: 12.5 % (ref 11.9–14.6)
GLOBULIN SER CALC-MCNC: 3.3 G/DL (ref 2.8–4.5)
GLUCOSE SERPL-MCNC: 115 MG/DL (ref 65–100)
HCT VFR BLD AUTO: 37.2 % (ref 41.1–50.3)
HGB BLD-MCNC: 12.7 G/DL (ref 13.6–17.2)
IMM GRANULOCYTES # BLD AUTO: 0 K/UL (ref 0–0.5)
IMM GRANULOCYTES NFR BLD AUTO: 0 % (ref 0–5)
LYMPHOCYTES # BLD: 0.5 K/UL (ref 0.5–4.6)
LYMPHOCYTES NFR BLD: 10 % (ref 13–44)
MCH RBC QN AUTO: 33.6 PG (ref 26.1–32.9)
MCHC RBC AUTO-ENTMCNC: 34.1 G/DL (ref 31.4–35)
MCV RBC AUTO: 98.4 FL (ref 82–102)
MONOCYTES # BLD: 0.6 K/UL (ref 0.1–1.3)
MONOCYTES NFR BLD: 11 % (ref 4–12)
NEUTS SEG # BLD: 4 K/UL (ref 1.7–8.2)
NEUTS SEG NFR BLD: 73 % (ref 43–78)
NRBC # BLD: 0 K/UL (ref 0–0.2)
PLATELET # BLD AUTO: 197 K/UL (ref 150–450)
PMV BLD AUTO: 9.3 FL (ref 9.4–12.3)
POTASSIUM SERPL-SCNC: 3.6 MMOL/L (ref 3.5–5.1)
PROT SERPL-MCNC: 7.1 G/DL (ref 6.3–8.2)
PSA SERPL-MCNC: 2.1 NG/ML
RBC # BLD AUTO: 3.78 M/UL (ref 4.23–5.6)
SODIUM SERPL-SCNC: 140 MMOL/L (ref 133–143)
WBC # BLD AUTO: 5.4 K/UL (ref 4.3–11.1)

## 2023-03-16 PROCEDURE — 84153 ASSAY OF PSA TOTAL: CPT

## 2023-03-16 PROCEDURE — 99215 OFFICE O/P EST HI 40 MIN: CPT | Performed by: INTERNAL MEDICINE

## 2023-03-16 PROCEDURE — 1123F ACP DISCUSS/DSCN MKR DOCD: CPT | Performed by: INTERNAL MEDICINE

## 2023-03-16 PROCEDURE — 2580000003 HC RX 258: Performed by: INTERNAL MEDICINE

## 2023-03-16 PROCEDURE — 36591 DRAW BLOOD OFF VENOUS DEVICE: CPT

## 2023-03-16 PROCEDURE — 80053 COMPREHEN METABOLIC PANEL: CPT

## 2023-03-16 PROCEDURE — 85025 COMPLETE CBC W/AUTO DIFF WBC: CPT

## 2023-03-16 PROCEDURE — 96413 CHEMO IV INFUSION 1 HR: CPT

## 2023-03-16 PROCEDURE — 6360000002 HC RX W HCPCS: Performed by: INTERNAL MEDICINE

## 2023-03-16 RX ORDER — SODIUM CHLORIDE 9 MG/ML
5-250 INJECTION, SOLUTION INTRAVENOUS PRN
Status: DISCONTINUED | OUTPATIENT
Start: 2023-03-16 | End: 2023-03-17 | Stop reason: HOSPADM

## 2023-03-16 RX ORDER — SODIUM CHLORIDE 0.9 % (FLUSH) 0.9 %
10 SYRINGE (ML) INJECTION PRN
Status: DISCONTINUED | OUTPATIENT
Start: 2023-03-16 | End: 2023-03-17 | Stop reason: HOSPADM

## 2023-03-16 RX ORDER — HEPARIN SODIUM (PORCINE) LOCK FLUSH IV SOLN 100 UNIT/ML 100 UNIT/ML
500 SOLUTION INTRAVENOUS PRN
Status: DISCONTINUED | OUTPATIENT
Start: 2023-03-16 | End: 2023-03-17 | Stop reason: HOSPADM

## 2023-03-16 RX ORDER — SODIUM CHLORIDE 9 MG/ML
5-40 INJECTION INTRAVENOUS PRN
Status: DISCONTINUED | OUTPATIENT
Start: 2023-03-16 | End: 2023-03-17 | Stop reason: HOSPADM

## 2023-03-16 RX ADMIN — SODIUM CHLORIDE, PRESERVATIVE FREE 10 ML: 5 INJECTION INTRAVENOUS at 12:40

## 2023-03-16 RX ADMIN — SODIUM CHLORIDE, PRESERVATIVE FREE 10 ML: 5 INJECTION INTRAVENOUS at 09:28

## 2023-03-16 RX ADMIN — SODIUM CHLORIDE 200 MG: 9 INJECTION, SOLUTION INTRAVENOUS at 12:00

## 2023-03-16 RX ADMIN — SODIUM CHLORIDE 50 ML/HR: 9 INJECTION, SOLUTION INTRAVENOUS at 11:05

## 2023-03-16 ASSESSMENT — PATIENT HEALTH QUESTIONNAIRE - PHQ9
SUM OF ALL RESPONSES TO PHQ QUESTIONS 1-9: 0
2. FEELING DOWN, DEPRESSED OR HOPELESS: 0

## 2023-03-16 NOTE — PROGRESS NOTES
[facility] Hematology & Oncology: Office Visit Progress Note    Chief Complaint:    Melanoma of right posterior calf pT3bN1    History of Present Illness:  Reason for Referral: Melanoma of right posterior calf     Referring Provider: Rk Corbin MD     Primary Care Provider: Terrance Tan MD     Family History of Cancer/Hematologic Disorders: Family history is significant for mother with cervical cancer. Presenting Symptoms: Enlarging skin lesion to right calf     Mr. Karina Ramires is a 65 y.o. white male with a history of tobacco use (former smoker), atrial fibrillation, cardioversion, HLD, HTN, cataract extraction, ED, penile prosthesis, prostate cancer, elevated MCV, B12 deficiency, IFG, right optic neuritis, kidney stone, and vertigo, who was recently diagnosed with melanoma of the right posterior calf. A shave biopsy of a lesion to patient's right distal calf done by Dermatologist, Dr. Mikie Hayes, on 8/24/22 identified a malignant melanoma, Breslow 3.4 mm; IV; +ulceration, +regression; 20 mitosis/ sq mm; no microscopic satellitosis; no LVI. The lesion had developed over several months and was getting larger. Patient was referred to Surgeon, Dr. Good Tran, and evaluated in consultation on 9/21/22. He was assessed with signs and symptoms of a pT3bN0 right calf malignant melanoma and recommended for wide excision with split-thickness skin grafting and sentinel node excision. He was taken to the OR on 10/18/22 and underwent radical resection of soft tissue of right posterior calf for invasive melanoma; right inguinofemoral sentinel node excision x 2 following intraoperative mapping and identification; split-thickness skin grafting from right anterior thigh to right posterior calf; and advancement flap closure of right thigh graft harvest site. Two right inguinofemoral nodes and wide excision of right posterior calf was sent to Pathology for review.  Final pathology revealed malignant melanoma confined to dermis and subcutaneous tissue, measuring 3.9 mm in depth, Jose C's level 5. Tumor was within less than 1 mm of the deep margin and greater than 5 mm from all peripheral margins. Separate focus of melanoma in situ at the area was marked by suture. Margins were uninvolved. Metastatic melanoma was present in the right inguinofemoral sentinel node 2, and the right inguinofemoral sentinel node 1 was benign. Chest x-ray on 10/24/22 showed no acute cardiopulmonary abnormality. PET CT on 12/7/22 demonstrated a region of uptake in the right mid-calf consistent with postsurgical changes related to recent melanoma resection; status post right inguinal lymph node dissection; indeterminate dermal-based nodular focus more laterally in the anterior proximal thigh subcutaneous tissues with mild elevation of FDG uptake; no evidence of metastatic disease in the head, neck, or chest; and focus of asymmetric elevated uptake in the right prostate. Radiologist recommended correlating with PSA and considering prostate MRI. During post-op follow-up on 12/28/22, Dr. Sonia Mendez notes, Kari Ellis discussed inguinofemoral node dissection given his N1 node status vs serial ultrasounds and observation. He elects serial ultrasounds and observation. I referred him to medical oncology for adjuvant treatment options.  Dr. Sonia Mendez also notes that patient has already been diagnosed with prostate carcinoma and is considering his options with Urology. He is followed by Urologist, Dr. Jad Orellana at [facility]. He initially presented with an elevated PSA of 5.51 on 9/9/16. He was referred to Urologist, Dr. David Dominguez, for further management. He opted to proceed with active surveillance at that time and continue to monitor PSA. He established with Dr. Marco Antonio Alcala on 9/22/22 in consultation for his elevated PSA. Dr. David Dominguez had recommended an MRI prostate be performed.  However, MRI prostate was not obtained due to penile prothesis implant that patient has in place per Dr. Sherrell Gorman report and MRI compatibility was unknown. Per patient, this was placed about 40 years ago. His PSA began to increase and was 10.939 as of 9/22/22. Since an MRI was unable to be obtained, Dr. Josselin King recommended proceeding with prostate biopsy due to persistent elevated PSA. Prostate biopsy with Dr. Josselin King on 10/3/22 revealed Mcnary 3+4, grade group 2 adenocarcinoma in 4 specimens and Mcnary 3+3, grade group 1 adenocarcinoma in 5 specimens (9/12 specimens involved overall). He met with Dr. Alfredo Pabon on 12/14/22 to discuss options for his prostate cancer. He declined ADT due to toxicity concerns. He was referred to Radiation Oncology and evaluated in consultation by Radiation Oncologist, Dr. Jacquelyn Lieberman, at [facility] on 1/12/23. Radiation treatment options were discussed, and patient requested time to further consider the options of prostatectomy versus radiation before making decision on how to proceed. PATHOLOGY REPORT 8/24/22     SURGICAL PATHOLOGY REPORT 10/3/22  Final Diagnosis    [facility] LABORATORY   A.  Prostate, right base, core biopsy:  Prostatic adenocarcinoma, Helen score 3+3 = 6 (grade Group 1)  Tumor involves 10% of core     B. Prostate, right mid, core biopsy:  Prostatic adenocarcinoma, Mcnary score 3+4 = 7 (grade group 2)  Percent pattern 4: 20%. Tumor involves 10% of core. C.  Prostate, right apex, core biopsy:  Prostatic adenocarcinoma, Mcnary score 3+3 = 6 (grade Group 1). Tumor involves 20% of core. B.  Prostate, right lateral base, core biopsy:  Prostatic adenocarcinoma, Helen score 3+3 = 6 (grade Group 1). Tumor involves 30% of core. E.  Prostate, right lateral mid, core biopsy:  Prostatic adenocarcinoma, Helen score 3+3 = 6 (grade Group 1). Tumor involves 50% of core. F.  Prostate, right lateral apex, core biopsy:  Prostatic adenocarcinoma, Helen score 3+3 = 6 (grade Group 1).   Tumor involves 20% of core.     G-I.  Prostate-left base, left mid, left apex-core biopsies:  Benign prostate tissue.     J.  Prostate, left lateral base, core biopsy:  Prostatic adenocarcinoma, Helen score 3+4 = 7 (grade group 2).  Percent pattern 4: 10%.  Tumor involves 75% of core.     K.  Prostate, left lateral mid, core biopsy:  Prostatic adenocarcinoma, Helen score 3+4 = 7 (grade group 2)  Percent pattern 4: 30%.  Tumor involves 80%.     L.  Prostate, left lateral apex, core biopsy:  Prostatic adenocarcinoma, Helen score 3+4 = 7 (grade group 2)  Percent pattern 4: 30%.  Tumor involves 70% of core.      [facility] SURGICAL PATHOLOGY REPORT 10/18/22  DIAGNOSIS   A: “RIGHT INGUINOFEMORAL SENTINEL NODE 2”: METASTATIC MELANOMA PRESENT.   B: “WIDE LOCAL EXCISION RIGHT POSTERIOR CALF”: MALIGNANT MELANOMA CONFINED TO DERMIS AND SUBCUTANEOUS TISSUE, 3.9 MM IN DEPTH, DELORES'S LEVEL 5, TUMOR WITHIN LESS THAN 1 MM OF THE DEEP MARGIN, GREATER THAN 5 MM FROM ALL PERIPHERAL MARGINS. SEPARATE FOCUS OF MELANOMA IN SITU AT AREA MARKED BY SUTURE, MARGINS UNINVOLVED.   C: “RIGHT INGUINOFEMORAL SENTINEL NODE 1”: BENIGN LYMPH NODE, NEGATIVE FOR TUMOR.     XR CHEST (2 VW) 10/24/2022  FINDINGS:   Support Devices:   *  None  Cardiac Silhouette: Within normal limits in size.   Mediastinum: Normal mediastinal contours.   Lungs: No airspace consolidation.  No pneumothorax or sizable pleural effusion.   Upper Abdomen: Normal    Miscellaneous: No fracture or suspicious osseous lesion.  IMPRESSION: No acute cardiopulmonary abnormality.     WHOLE-BODY PET/CT  12/7/22  FINDINGS:  Head and Neck: No enlarged or radiotracer avid lymph nodes.   Chest: No discrete lung nodule. No mediastinal mass or lymphadenopathy. Multivessel coronary atherosclerotic calcification. Aortic atherosclerotic calcifications without aneurysmal dilation.  Abdomen/Pelvis: No enlarged radiotracer avid lymph nodes in the abdomen or pelvis. Postsurgical changes from a right  inguinal lymph node dissection. More laterally in the anterior proximal thighs tiny nodular focus with slight elevation of FDG uptake, maximum SUV 2.6. Penile prosthesis noted. There is a focus of asymmetric uptake in the right prostate. No aggressive appearing bone lesions. Small region of elevated tracer uptake in right mid calf, maximum SUV 5.1.   IMPRESSION:  1.  Region of uptake in right mid calf consistent with postsurgical changes related to recent melanoma resection.  2.  Status post right inguinal lymph node dissection. Indeterminate dermal-based nodular focus more laterally in the anterior proximal thigh subcutaneous tissues with mild elevation of FDG uptake.  3.  No evidence of metastatic disease in the head, neck, or chest.  4.  Focus of asymmetric elevated uptake in the right prostate. Recommend correlating with PSA and consider prostate MRI.         Review of Systems:  Constitutional Denies fever or chills.  Denies weight loss or appetite changes. Denies fatigue. Denies anorexia.   HEENT Denies trauma, bluring vision, hearing loss, ear pain, nosebleeds, sore throat, neck pain and ear discharge.    Skin Denies lesions or rashes.   Lungs Denies shortness of breath, cough, sputum production or hemoptysis.   Cardiovascular Denies chest pain, palpitations, orthopnea, claudication and leg swelling.   Gastrointestinal Denies nausea, vomiting, bowel changes.  Denies bloody or black stools. Denies abdominal pain.    Denies dysuria, frequency or hesitancy of urination   Neuro Denies headaches, visual changes or ataxia. Denies dizziness, tingling, tremors, sensory change, speech change, focal weakness and headaches.     Hematology Denies nasal/gum bleeding, denies easy bruise   Endo Denies heat/cold intolerance, denies diabetes.   MSK Denies back pain, swollen legs, myalgias and falls.     Psychiatric/Behavioral Denies depression and substance abuse. The patient is not nervous/anxious.       Allergies  Allergen Reactions    Lisinopril Cough     Past Medical History:   Diagnosis Date    History of cardioversion 2022    converted then a fib returned 2 weeks    Hyperlipidemia     Hypertension     New onset a-fib ([address] 2022    cardioversion @ [place] [facility]-Afib returned 2 weeks later- Eliquis & Metoprolol- [facility]     Past Surgical History:   Procedure Laterality Date    CARDIOVERSION  2022    & [facility]    CATARACT EXTRACTION      IR PORT PLACEMENT EQUAL OR GREATER THAN 5 YEARS  2/10/2023    IR PORT PLACEMENT EQUAL OR GREATER THAN 5 YEARS 2/10/2023 [facility] RADIOLOGY SPECIALS    LEG BIOPSY EXCISION Right 10/18/2022    wide local excision right posterior calf melanoma with performed by Severo Knack, MD at [place] [address] Right 10/18/2022    SENTINEL LYMPH NODE EXCISION RIGHT INGUINAL performed by Severo Knack, MD at [address] [place] [address] Right 10/18/2022    SPLIT THICKNESS SKIN GRAFT, RIGHT LEG performed by Severo Knack, MD at [facility] MAIN OR     Family History   Problem Relation Age of Onset    Cancer Mother      Social History     Socioeconomic History    Marital status:      Spouse name: Not on file    Number of children: Not on file    Years of education: Not on file    Highest education level: Not on file   Occupational History    Not on file   Tobacco Use    Smoking status: Former     Types: Cigarettes     Quit date: 1959     Years since quittin.2    Smokeless tobacco: Never   Vaping Use    Vaping Use: Never used   Substance and Sexual Activity    Alcohol use: Not Currently    Drug use: Not Currently    Sexual activity: Not on file   Other Topics Concern    Not on file   Social History Narrative    Not on file     Social Determinants of Health     Financial Resource Strain: Not on file   Food Insecurity: Not on file   Transportation Needs: Not on file   Physical Activity: Not on file   Stress: Not on file   Social Connections: Not on file   Intimate Partner Violence: Not on file   Housing Stability: Not on file     Current Outpatient Medications   Medication Sig Dispense Refill    tamsulosin (FLOMAX) 0.4 MG capsule 0.4 mg daily      finasteride (PROSCAR) 5 MG tablet       losartan-hydroCHLOROthiazide (HYZAAR) 100-25 MG per tablet Take 1 tablet by mouth daily      apixaban (ELIQUIS) 5 MG TABS tablet Take 5 mg by mouth 2 times daily Hold 48 hours prior to surgery per medication hold [facility]      atorvastatin (LIPITOR) 20 MG tablet Take 20 mg by mouth every morning      cetirizine (ZYRTEC) 10 MG tablet Take 10 mg by mouth daily      metoprolol tartrate (LOPRESSOR) 25 MG tablet Take 25 mg by mouth 2 times daily      Multiple Vitamin (MULTIVITAMINS PO) Take 1 tablet by mouth daily       No current facility-administered medications for this visit. Facility-Administered Medications Ordered in Other Visits   Medication Dose Route Frequency Provider Last Rate Last Admin    sodium chloride flush 0.9 % injection 10 mL  10 mL IntraVENous PRN Marybeth Richards MD   10 mL at 03/16/23 0928    0.9 % sodium chloride infusion  5-250 mL/hr IntraVENous PRN Marybeth Richards MD        sodium chloride (PF) 0.9 % injection 5-40 mL  5-40 mL IntraVENous PRMEHRDAD Richards MD        0.9 % sodium chloride infusion  5-250 mL/hr IntraVENous PRMEHRDAD Richards MD 50 mL/hr at 03/16/23 1105 50 mL/hr at 03/16/23 1105    heparin flush 100 UNIT/ML injection 500 Units  500 Units IntraCATHeter PRMEHRDAD Richards MD        alteplase (CATHFLO) 2 mg in sterile water 2 mL injection  2 mg IntraCATHeter PRN Marybeth Richards MD           OBJECTIVE:  /68 (Site: Right Upper Arm, Position: Standing)   Pulse (!) 109   Temp 98.1 °F (36.7 °C)   Resp 16   Ht 5' 9.29\" (1.76 m)   Wt 178 lb 6.4 oz (80.9 kg)   SpO2 98%   BMI 26.13 kg/m²     Physical Exam:  Constitutional: Oriented to person, place, and time.    Well-developed and well-nourished. HEENT: Normocephalic and atraumatic. Oropharynx is clear and moist.   Conjunctivae and EOM are normal. Pupils are equal, round, and reactive to light. No scleral icterus. Neck supple. No JVD present. No tracheal deviation present. No thyromegaly present. Lymph node No palpable submandibular, cervical, supraclavicular, axillary and inguinal lymph nodes. Skin Right groin and calf surgical scar/graft. Warm and dry. No bruising and no rash noted. No erythema. No pallor. Respiratory Effort normal and breath sounds normal.  No respiratory distress. No wheezes. No rales. No tenderness. CVS Normal rate, regular rhythm and normal heart sounds. Exam reveals no gallop, no friction and no rub. No murmur heard. Abdomen Soft. Bowel sounds are normal. Exhibits no distension. There is no tenderness. There is no rebound and no guarding. Neuro Normal reflexes. No cranial nerve deficit. Exhibits normal muscle tone, 5 of 5 strength of all extremities. MSK Normal range of motion in general.  No edema and no tenderness.    Psych Normal mood, affect, behavior, judgment and thought content      Labs:  Recent Results (from the past 24 hour(s))   CBC with Auto Differential    Collection Time: 03/16/23  9:21 AM   Result Value Ref Range    WBC 5.4 4.3 - 11.1 K/uL    RBC 3.78 (L) 4.23 - 5.6 M/uL    Hemoglobin 12.7 (L) 13.6 - 17.2 g/dL    Hematocrit 37.2 (L) 41.1 - 50.3 %    MCV 98.4 82.0 - 102.0 FL    MCH 33.6 (H) 26.1 - 32.9 PG    MCHC 34.1 31.4 - 35.0 g/dL    RDW 12.5 11.9 - 14.6 %    Platelets 880 505 - 911 K/uL    MPV 9.3 (L) 9.4 - 12.3 FL    nRBC 0.00 0.0 - 0.2 K/uL    Seg Neutrophils 73 43 - 78 %    Lymphocytes 10 (L) 13 - 44 %    Monocytes 11 4.0 - 12.0 %    Eosinophils % 5 0.5 - 7.8 %    Basophils 1 0.0 - 2.0 %    Immature Granulocytes 0 0.0 - 5.0 %    Segs Absolute 4.0 1.7 - 8.2 K/UL    Absolute Lymph # 0.5 0.5 - 4.6 K/UL    Absolute Mono # 0.6 0.1 - 1.3 K/UL    Absolute Eos # 0.2 0.0 - 0.8 K/UL    Basophils Absolute 0.0 0.0 - 0.2 K/UL    Absolute Immature Granulocyte 0.0 0.0 - 0.5 K/UL    Differential Type AUTOMATED     Comprehensive Metabolic Panel    Collection Time: 03/16/23  9:21 AM   Result Value Ref Range    Sodium 140 133 - 143 mmol/L    Potassium 3.6 3.5 - 5.1 mmol/L    Chloride 106 101 - 110 mmol/L    CO2 29 21 - 32 mmol/L    Anion Gap 5 2 - 11 mmol/L    Glucose 115 (H) 65 - 100 mg/dL    BUN 18 8 - 23 MG/DL    Creatinine 0.80 0.8 - 1.5 MG/DL    Est, Glom Filt Rate >60 >60 ml/min/1.73m2    Calcium 9.7 8.3 - 10.4 MG/DL    Total Bilirubin 0.5 0.2 - 1.1 MG/DL    ALT 35 12 - 65 U/L    AST 32 15 - 37 U/L    Alk Phosphatase 92 50 - 136 U/L    Total Protein 7.1 6.3 - 8.2 g/dL    Albumin 3.8 3.2 - 4.6 g/dL    Globulin 3.3 2.8 - 4.5 g/dL    Albumin/Globulin Ratio 1.2 0.4 - 1.6     PSA, Diagnostic    Collection Time: 03/16/23  9:21 AM   Result Value Ref Range    PSA 2.1 <4.0 ng/mL       Imaging:  No results found for this or any previous visit. ASSESSMENT/PLAN:   Diagnosis Orders   1. Melanoma of right posterior calf ([address]        2. Lower extremity edema  Vascular duplex lower extremity venous right      3. Prostate cancer ([address]  CBC with Auto Differential    Comprehensive Metabolic Panel    TSH    PSA, Diagnostic      4. High risk medication use  TSH              65 y.o. M consulted for right leg melanoma presented [facility] on [facility]/25/2023.   He was found of the right calf melanoma and Dr. Eliot Cervantes performed the resection with sentinel lymph node dissection on 10/18/2022, final pathology showed T3BN1 melanoma with ulceration, healed well and we discussed that the indication of adjuvant immunotherapy to increase chance of cure, patient very interested and arranged to start [address] next week, patient will determine whether he wants to have a port placed, is also diagnosed with prostate cancer Helen score 7 pending definitive radiation therapy in [place], study has shown that [address] and radiation are compatible without significant increase of toxicity, educated for risk and management, arrange for authorization and started adjuvant Keytruda from 2/1/2023, return on 3/16/2023, PSA down to 2.1 responding to prostate cancer radiation at [facility], hemoglobin down to 12.7 and continue trending, otherwise labs good to proceed for cycle 3 Keytruda, Zofran as needed, right leg swelling and Doppler to rule out DVT, return in 3 weeks for cycle 4 but call as needed. All questions are answered to their satisfaction. They will call for further questions and concerns. ECOG PERFORMANCE STATUS - 0-Fully active, able to carry on all pre-disease performance without restriction. Pain - 0 - No pain/10. None/Minimal pain - not affecting QOL     Fatigue - No flowsheet data found. Distress - No flowsheet data found. Elements of this note have been dictated via voice recognition software. Text and phrases may be limited by the accuracy and autoconversion of the software. The chart has been reviewed, but errors may still be present. Lisa Jo M.D.   [address]  Office : [phone]  Fax : [phone]

## 2023-03-16 NOTE — PROGRESS NOTES
Arrived to the infusion enter. Labs reviewed and assessment done. Completed Keytruda without signs of adverse reaction. No new issues or concerns voiced during the visit. . Aware of his next appointment on 4/6 at 1430. Discharged ambulatory in satisfactory condition.

## 2023-03-16 NOTE — PATIENT INSTRUCTIONS
Patient Instructions from Today's Visit    Reason for Visit:  Follow Up     Plan:  Alverto coker  Right leg ultrasound    Follow Up: Follow up appointment in three weeks.     Recent Lab Results:  Hospital Outpatient Visit on 03/16/2023   Component Date Value Ref Range Status    WBC 03/16/2023 5.4  4.3 - 11.1 K/uL Final    RBC 03/16/2023 3.78 (A)  4.23 - 5.6 M/uL Final    Hemoglobin 03/16/2023 12.7 (A)  13.6 - 17.2 g/dL Final    Hematocrit 03/16/2023 37.2 (A)  41.1 - 50.3 % Final    MCV 03/16/2023 98.4  82.0 - 102.0 FL Final    MCH 03/16/2023 33.6 (A)  26.1 - 32.9 PG Final    MCHC 03/16/2023 34.1  31.4 - 35.0 g/dL Final    RDW 03/16/2023 12.5  11.9 - 14.6 % Final    Platelets 13/30/4724 197  150 - 450 K/uL Final    MPV 03/16/2023 9.3 (A)  9.4 - 12.3 FL Final    nRBC 03/16/2023 0.00  0.0 - 0.2 K/uL Final    **Note: Absolute NRBC parameter is now reported with Hemogram**    Seg Neutrophils 03/16/2023 73  43 - 78 % Final    Lymphocytes 03/16/2023 10 (A)  13 - 44 % Final    Monocytes 03/16/2023 11  4.0 - 12.0 % Final    Eosinophils % 03/16/2023 5  0.5 - 7.8 % Final    Basophils 03/16/2023 1  0.0 - 2.0 % Final    Immature Granulocytes 03/16/2023 0  0.0 - 5.0 % Final    Segs Absolute 03/16/2023 4.0  1.7 - 8.2 K/UL Final    Absolute Lymph # 03/16/2023 0.5  0.5 - 4.6 K/UL Final    Absolute Mono # 03/16/2023 0.6  0.1 - 1.3 K/UL Final    Absolute Eos # 03/16/2023 0.2  0.0 - 0.8 K/UL Final    Basophils Absolute 03/16/2023 0.0  0.0 - 0.2 K/UL Final    Absolute Immature Granulocyte 03/16/2023 0.0  0.0 - 0.5 K/UL Final    Differential Type 03/16/2023 AUTOMATED    Final    Sodium 03/16/2023 140  133 - 143 mmol/L Final    Potassium 03/16/2023 3.6  3.5 - 5.1 mmol/L Final    Chloride 03/16/2023 106  101 - 110 mmol/L Final    CO2 03/16/2023 29  21 - 32 mmol/L Final    Anion Gap 03/16/2023 5  2 - 11 mmol/L Final    Glucose 03/16/2023 115 (A)  65 - 100 mg/dL Final    BUN 03/16/2023 18  8 - 23 MG/DL Final    Creatinine 03/16/2023 0.80 0.8 - 1.5 MG/DL Final    Est, Glojaciel Filt Rate 03/16/2023 >60  >60 ml/min/1.73m2 Final    Comment:      Pediatric calculator link: [employer].at. org/professionals/kdoqi/gfr_calculatorped       These results are not intended for use in patients <25years of age. eGFR results are calculated without a race factor using  the 2021 CKD-EPI equation. Careful clinical correlation is recommended, particularly when comparing to results calculated using previous equations. The CKD-EPI equation is less accurate in patients with extremes of muscle mass, extra-renal metabolism of creatinine, excessive creatine ingestion, or following therapy that affects renal tubular secretion. Calcium 03/16/2023 9.7  8.3 - 10.4 MG/DL Final    Total Bilirubin 03/16/2023 0.5  0.2 - 1.1 MG/DL Final    ALT 03/16/2023 35  12 - 65 U/L Final    AST 03/16/2023 32  15 - 37 U/L Final    Alk Phosphatase 03/16/2023 92  50 - 136 U/L Final    Total Protein 03/16/2023 7.1  6.3 - 8.2 g/dL Final    Albumin 03/16/2023 3.8  3.2 - 4.6 g/dL Final    Globulin 03/16/2023 3.3  2.8 - 4.5 g/dL Final    Albumin/Globulin Ratio 03/16/2023 1.2  0.4 - 1.6   Final    PSA 03/16/2023 2.1  <4.0 ng/mL Final    Comment: [employer]. New method in use, please reestablish patient baseline  [employer] [place] LOCI technology. Patient's results of tumor marker testing may not be comparable to labs using different manufacturers/methods. Treatment Summary has been discussed and given to patient: N/A        -------------------------------------------------------------------------------------------------------------------  Please call our office at [phone] if you have any  of the following symptoms:   Fever of 100.5 or greater  Chills  Shortness of breath  Swelling or pain in one leg    After office hours an answering service is available and will contact a provider for emergencies or if you are experiencing any of the above symptoms.     Patient does express an interest in My Chart. My Chart log in information explained on the after visit summary printout at the [address] desk.     Abdirahman Bob MA

## 2023-03-17 ENCOUNTER — HOSPITAL ENCOUNTER (OUTPATIENT)
Dept: ULTRASOUND IMAGING | Age: 78
End: 2023-03-17
Payer: MEDICARE

## 2023-03-17 DIAGNOSIS — R60.0 LOWER EXTREMITY EDEMA: ICD-10-CM

## 2023-03-17 PROCEDURE — 93971 EXTREMITY STUDY: CPT

## 2023-03-27 ENCOUNTER — OFFICE VISIT (OUTPATIENT)
Dept: SURGERY | Age: 78
End: 2023-03-27
Payer: MEDICARE

## 2023-03-27 DIAGNOSIS — C43.71 MELANOMA OF RIGHT POSTERIOR CALF (HCC): Primary | ICD-10-CM

## 2023-03-27 PROCEDURE — 1123F ACP DISCUSS/DSCN MKR DOCD: CPT | Performed by: SURGERY

## 2023-03-27 PROCEDURE — 99214 OFFICE O/P EST MOD 30 MIN: CPT | Performed by: SURGERY

## 2023-03-27 NOTE — PROGRESS NOTES
impairment    Recent vitals (if inpt):  @IPVITALS(24:)@    Amount and/or Complexity of Data Reviewed and Analyzed:  I reviewed and analyzed all of the unique labs and radiologic studies that are shown below as well as any that are in the HPI, and any that are in the expanded problem list below  *Each unique test, order, or document contributes to the combination of 2 or combination of 3 in Category 1 below. For this visit I also reviewed old records and prior notes. No results for input(s): WBC, HGB, PLT, NA, K, CL, CO2, BUN, CREA, GLU, INR, APTT, ALT, AML, AML, LCAD, PCO2, PO2, HCO3 in the last 72 hours.     Invalid input(s): PTP, TBIL, TBILI, CBIL, SGOT, GPT, AP, LPSE, NH4, TROPT, TROIQ,  PH  Review of most recent CBC  Lab Results   Component Value Date    WBC 5.4 03/16/2023    HGB 12.7 (L) 03/16/2023    HCT 37.2 (L) 03/16/2023    MCV 98.4 03/16/2023     03/16/2023       Review of most recent BMP  Lab Results   Component Value Date/Time     03/16/2023 09:21 AM    K 3.6 03/16/2023 09:21 AM     03/16/2023 09:21 AM    CO2 29 03/16/2023 09:21 AM    BUN 18 03/16/2023 09:21 AM    CREATININE 0.80 03/16/2023 09:21 AM    GLUCOSE 115 03/16/2023 09:21 AM    CALCIUM 9.7 03/16/2023 09:21 AM        Review of most recent LFTs (and lipase if done)  Lab Results   Component Value Date    ALT 35 03/16/2023    AST 32 03/16/2023    ALKPHOS 92 03/16/2023    BILITOT 0.5 03/16/2023     No results found for: LIPASE    No results found for: INR, APTT, LCAD    Review of most recent HgbA1c  No results found for: LABA1C  No results found for: EAG    Nutritional assessment screen for wound healing issues:  Lab Results   Component Value Date    LABALBU 3.8 03/16/2023       @lastcovr@    Xray Result (most recent):  XR CHEST STANDARD TWO VW 10/24/2022    Narrative  EXAMINATION: XR CHEST (2 VW) 10/24/2022 2:40 PM    ACCESSION NUMBER: [id number]    COMPARISON: None available    INDICATION: Malignant melanoma of right lower

## 2023-04-06 ENCOUNTER — OFFICE VISIT (OUTPATIENT)
Dept: ONCOLOGY | Age: 78
End: 2023-04-06
Payer: MEDICARE

## 2023-04-06 ENCOUNTER — HOSPITAL ENCOUNTER (OUTPATIENT)
Dept: INFUSION THERAPY | Age: 78
Discharge: HOME | End: 2023-04-06
Payer: MEDICARE

## 2023-04-06 VITALS
HEART RATE: 72 BPM | BODY MASS INDEX: 26.79 KG/M2 | OXYGEN SATURATION: 97 % | DIASTOLIC BLOOD PRESSURE: 78 MMHG | WEIGHT: 180.9 LBS | TEMPERATURE: 97.5 F | SYSTOLIC BLOOD PRESSURE: 113 MMHG | HEIGHT: 69 IN | RESPIRATION RATE: 14 BRPM

## 2023-04-06 DIAGNOSIS — C43.71 MELANOMA OF RIGHT POSTERIOR CALF (HCC): Primary | ICD-10-CM

## 2023-04-06 DIAGNOSIS — C61 PROSTATE CANCER (HCC): ICD-10-CM

## 2023-04-06 DIAGNOSIS — D64.81 ANEMIA DUE TO ANTINEOPLASTIC CHEMOTHERAPY: ICD-10-CM

## 2023-04-06 DIAGNOSIS — R60.0 LOWER EXTREMITY EDEMA: ICD-10-CM

## 2023-04-06 DIAGNOSIS — Z79.899 HIGH RISK MEDICATION USE: ICD-10-CM

## 2023-04-06 DIAGNOSIS — T45.1X5A ANEMIA DUE TO ANTINEOPLASTIC CHEMOTHERAPY: ICD-10-CM

## 2023-04-06 LAB
ALBUMIN SERPL-MCNC: 3.7 G/DL (ref 3.2–4.6)
ALBUMIN/GLOB SERPL: 1.1 (ref 0.4–1.6)
ALP SERPL-CCNC: 97 U/L (ref 50–136)
ALT SERPL-CCNC: 31 U/L (ref 12–65)
ANION GAP SERPL CALC-SCNC: 6 MMOL/L (ref 2–11)
AST SERPL-CCNC: 23 U/L (ref 15–37)
BASOPHILS # BLD: 0 K/UL (ref 0–0.2)
BASOPHILS NFR BLD: 1 % (ref 0–2)
BILIRUB SERPL-MCNC: 0.4 MG/DL (ref 0.2–1.1)
BUN SERPL-MCNC: 19 MG/DL (ref 8–23)
CALCIUM SERPL-MCNC: 9.2 MG/DL (ref 8.3–10.4)
CHLORIDE SERPL-SCNC: 110 MMOL/L (ref 101–110)
CO2 SERPL-SCNC: 26 MMOL/L (ref 21–32)
CREAT SERPL-MCNC: 0.8 MG/DL (ref 0.8–1.5)
DIFFERENTIAL METHOD BLD: ABNORMAL
EOSINOPHIL # BLD: 0.2 K/UL (ref 0–0.8)
EOSINOPHIL NFR BLD: 5 % (ref 0.5–7.8)
ERYTHROCYTE [DISTWIDTH] IN BLOOD BY AUTOMATED COUNT: 12.8 % (ref 11.9–14.6)
GLOBULIN SER CALC-MCNC: 3.3 G/DL (ref 2.8–4.5)
GLUCOSE SERPL-MCNC: 133 MG/DL (ref 65–100)
HCT VFR BLD AUTO: 38 % (ref 41.1–50.3)
HGB BLD-MCNC: 12.4 G/DL (ref 13.6–17.2)
IMM GRANULOCYTES # BLD AUTO: 0 K/UL (ref 0–0.5)
IMM GRANULOCYTES NFR BLD AUTO: 0 % (ref 0–5)
LYMPHOCYTES # BLD: 0.6 K/UL (ref 0.5–4.6)
LYMPHOCYTES NFR BLD: 15 % (ref 13–44)
MCH RBC QN AUTO: 32.6 PG (ref 26.1–32.9)
MCHC RBC AUTO-ENTMCNC: 32.6 G/DL (ref 31.4–35)
MCV RBC AUTO: 100 FL (ref 82–102)
MONOCYTES # BLD: 0.4 K/UL (ref 0.1–1.3)
MONOCYTES NFR BLD: 11 % (ref 4–12)
NEUTS SEG # BLD: 2.7 K/UL (ref 1.7–8.2)
NEUTS SEG NFR BLD: 68 % (ref 43–78)
NRBC # BLD: 0 K/UL (ref 0–0.2)
PLATELET # BLD AUTO: 206 K/UL (ref 150–450)
PMV BLD AUTO: 9 FL (ref 9.4–12.3)
POTASSIUM SERPL-SCNC: 3.9 MMOL/L (ref 3.5–5.1)
PROT SERPL-MCNC: 7 G/DL (ref 6.3–8.2)
PSA SERPL-MCNC: 1.7 NG/ML
RBC # BLD AUTO: 3.8 M/UL (ref 4.23–5.6)
SODIUM SERPL-SCNC: 142 MMOL/L (ref 133–143)
TSH, 3RD GENERATION: 4.58 UIU/ML (ref 0.36–3.74)
WBC # BLD AUTO: 4 K/UL (ref 4.3–11.1)

## 2023-04-06 PROCEDURE — 84153 ASSAY OF PSA TOTAL: CPT

## 2023-04-06 PROCEDURE — 2580000003 HC RX 258: Performed by: INTERNAL MEDICINE

## 2023-04-06 PROCEDURE — 1123F ACP DISCUSS/DSCN MKR DOCD: CPT | Performed by: INTERNAL MEDICINE

## 2023-04-06 PROCEDURE — 36591 DRAW BLOOD OFF VENOUS DEVICE: CPT

## 2023-04-06 PROCEDURE — 85025 COMPLETE CBC W/AUTO DIFF WBC: CPT

## 2023-04-06 PROCEDURE — 96413 CHEMO IV INFUSION 1 HR: CPT

## 2023-04-06 PROCEDURE — 84443 ASSAY THYROID STIM HORMONE: CPT

## 2023-04-06 PROCEDURE — 80053 COMPREHEN METABOLIC PANEL: CPT

## 2023-04-06 PROCEDURE — 6360000002 HC RX W HCPCS: Performed by: INTERNAL MEDICINE

## 2023-04-06 PROCEDURE — 99215 OFFICE O/P EST HI 40 MIN: CPT | Performed by: INTERNAL MEDICINE

## 2023-04-06 RX ORDER — SODIUM CHLORIDE 9 MG/ML
5-250 INJECTION, SOLUTION INTRAVENOUS PRN
Status: DISCONTINUED | OUTPATIENT
Start: 2023-04-06 | End: 2023-04-07 | Stop reason: HOSPADM

## 2023-04-06 RX ORDER — ONDANSETRON 2 MG/ML
8 INJECTION INTRAMUSCULAR; INTRAVENOUS
Status: DISCONTINUED | OUTPATIENT
Start: 2023-04-06 | End: 2023-04-07 | Stop reason: HOSPADM

## 2023-04-06 RX ORDER — SODIUM CHLORIDE 0.9 % (FLUSH) 0.9 %
5-40 SYRINGE (ML) INJECTION PRN
Status: DISCONTINUED | OUTPATIENT
Start: 2023-04-06 | End: 2023-04-07 | Stop reason: HOSPADM

## 2023-04-06 RX ORDER — SODIUM CHLORIDE 0.9 % (FLUSH) 0.9 %
10 SYRINGE (ML) INJECTION PRN
Status: DISCONTINUED | OUTPATIENT
Start: 2023-04-06 | End: 2023-04-07 | Stop reason: HOSPADM

## 2023-04-06 RX ADMIN — SODIUM CHLORIDE, PRESERVATIVE FREE 10 ML: 5 INJECTION INTRAVENOUS at 13:28

## 2023-04-06 RX ADMIN — SODIUM CHLORIDE, PRESERVATIVE FREE 10 ML: 5 INJECTION INTRAVENOUS at 15:50

## 2023-04-06 RX ADMIN — SODIUM CHLORIDE 200 MG: 9 INJECTION, SOLUTION INTRAVENOUS at 15:13

## 2023-04-06 RX ADMIN — SODIUM CHLORIDE, PRESERVATIVE FREE 10 ML: 5 INJECTION INTRAVENOUS at 14:40

## 2023-04-06 RX ADMIN — SODIUM CHLORIDE 100 ML/HR: 9 INJECTION, SOLUTION INTRAVENOUS at 14:40

## 2023-04-06 ASSESSMENT — PATIENT HEALTH QUESTIONNAIRE - PHQ9
SUM OF ALL RESPONSES TO PHQ QUESTIONS 1-9: 0
2. FEELING DOWN, DEPRESSED OR HOPELESS: 0
SUM OF ALL RESPONSES TO PHQ QUESTIONS 1-9: 0

## 2023-04-06 NOTE — PATIENT INSTRUCTIONS
100.5 or greater  Chills  Shortness of breath  Swelling or pain in one leg    After office hours an answering service is available and will contact a provider for emergencies or if you are experiencing any of the above symptoms. Patient does not express an interest in My Chart. My Chart log in information explained on the after visit summary printout at the [facility].     Candice Friend RN

## 2023-04-06 NOTE — PROGRESS NOTES
Arrived to the [facility]. Keytruda completed. Patient tolerated without difficulty. Any issues or concerns during appointment: None. Patient aware of next infusion appointment on 04/27/2023 (date) at 0830 (time). Patient instructed to call provider with temperature of 100.4 or greater or nausea/vomiting/ diarrhea or pain not controlled by medications  Discharged ambulatory to home.

## 2023-04-06 NOTE — PROGRESS NOTES
normal and breath sounds normal.  No respiratory distress. No wheezes. No rales. No tenderness. CVS Normal rate, regular rhythm and normal heart sounds. Exam reveals no gallop, no friction and no rub. No murmur heard. Abdomen Soft. Bowel sounds are normal. Exhibits no distension. There is no tenderness. There is no rebound and no guarding. Neuro Normal reflexes. No cranial nerve deficit. Exhibits normal muscle tone, 5 of 5 strength of all extremities. MSK Normal range of motion in general.  No edema and no tenderness.    Psych Normal mood, affect, behavior, judgment and thought content      Labs:  Recent Results (from the past 24 hour(s))   CBC with Auto Differential    Collection Time: 04/06/23  1:22 PM   Result Value Ref Range    WBC 4.0 (L) 4.3 - 11.1 K/uL    RBC 3.80 (L) 4.23 - 5.6 M/uL    Hemoglobin 12.4 (L) 13.6 - 17.2 g/dL    Hematocrit 38.0 (L) 41.1 - 50.3 %    .0 82.0 - 102.0 FL    MCH 32.6 26.1 - 32.9 PG    MCHC 32.6 31.4 - 35.0 g/dL    RDW 12.8 11.9 - 14.6 %    Platelets 735 130 - 136 K/uL    MPV 9.0 (L) 9.4 - 12.3 FL    nRBC 0.00 0.0 - 0.2 K/uL    Seg Neutrophils 68 43 - 78 %    Lymphocytes 15 13 - 44 %    Monocytes 11 4.0 - 12.0 %    Eosinophils % 5 0.5 - 7.8 %    Basophils 1 0.0 - 2.0 %    Immature Granulocytes 0 0.0 - 5.0 %    Segs Absolute 2.7 1.7 - 8.2 K/UL    Absolute Lymph # 0.6 0.5 - 4.6 K/UL    Absolute Mono # 0.4 0.1 - 1.3 K/UL    Absolute Eos # 0.2 0.0 - 0.8 K/UL    Basophils Absolute 0.0 0.0 - 0.2 K/UL    Absolute Immature Granulocyte 0.0 0.0 - 0.5 K/UL    Differential Type AUTOMATED     Comprehensive Metabolic Panel    Collection Time: 04/06/23  1:22 PM   Result Value Ref Range    Sodium PENDING mmol/L    Potassium PENDING mmol/L    Chloride PENDING mmol/L    CO2 PENDING mmol/L    Anion Gap PENDING mmol/L    Glucose 133 (H) 65 - 100 mg/dL    BUN 19 8 - 23 MG/DL    Creatinine 0.80 0.8 - 1.5 MG/DL    Est, Glom Filt Rate >60 >60 ml/min/1.73m2    Calcium 9.2 8.3 - 10.4

## 2023-04-06 NOTE — PROGRESS NOTES
Patient arrived to port lab for port access and lab draw   [address] accessed and labs drawn per protocol   *Port remains accessed. Patient discharged from port lab ambulatory.

## 2023-04-20 ENCOUNTER — CLINICAL DOCUMENTATION (OUTPATIENT)
Dept: INFUSION THERAPY | Age: 78
End: 2023-04-20

## 2023-04-27 ENCOUNTER — HOSPITAL ENCOUNTER (OUTPATIENT)
Dept: INFUSION THERAPY | Age: 78
Discharge: HOME | End: 2023-04-27
Payer: MEDICARE

## 2023-04-27 VITALS
BODY MASS INDEX: 26.85 KG/M2 | TEMPERATURE: 97.4 F | HEART RATE: 74 BPM | DIASTOLIC BLOOD PRESSURE: 77 MMHG | SYSTOLIC BLOOD PRESSURE: 132 MMHG | RESPIRATION RATE: 16 BRPM | OXYGEN SATURATION: 98 % | WEIGHT: 181.8 LBS

## 2023-04-27 DIAGNOSIS — C43.71 MELANOMA OF RIGHT POSTERIOR CALF (HCC): Primary | ICD-10-CM

## 2023-04-27 LAB
ALBUMIN SERPL-MCNC: 3.8 G/DL (ref 3.2–4.6)
ALBUMIN/GLOB SERPL: 1.1 (ref 0.4–1.6)
ALP SERPL-CCNC: 105 U/L (ref 50–136)
ALT SERPL-CCNC: 34 U/L (ref 12–65)
ANION GAP SERPL CALC-SCNC: 5 MMOL/L (ref 2–11)
AST SERPL-CCNC: 23 U/L (ref 15–37)
BASOPHILS # BLD: 0 K/UL (ref 0–0.2)
BASOPHILS NFR BLD: 1 % (ref 0–2)
BILIRUB SERPL-MCNC: 0.6 MG/DL (ref 0.2–1.1)
BUN SERPL-MCNC: 11 MG/DL (ref 8–23)
CALCIUM SERPL-MCNC: 9.4 MG/DL (ref 8.3–10.4)
CHLORIDE SERPL-SCNC: 110 MMOL/L (ref 101–110)
CO2 SERPL-SCNC: 26 MMOL/L (ref 21–32)
CREAT SERPL-MCNC: 0.8 MG/DL (ref 0.8–1.5)
DIFFERENTIAL METHOD BLD: ABNORMAL
EOSINOPHIL # BLD: 0.2 K/UL (ref 0–0.8)
EOSINOPHIL NFR BLD: 3 % (ref 0.5–7.8)
ERYTHROCYTE [DISTWIDTH] IN BLOOD BY AUTOMATED COUNT: 12.9 % (ref 11.9–14.6)
GLOBULIN SER CALC-MCNC: 3.4 G/DL (ref 2.8–4.5)
GLUCOSE SERPL-MCNC: 117 MG/DL (ref 65–100)
HCT VFR BLD AUTO: 41.5 % (ref 41.1–50.3)
HGB BLD-MCNC: 13.6 G/DL (ref 13.6–17.2)
IMM GRANULOCYTES # BLD AUTO: 0 K/UL (ref 0–0.5)
IMM GRANULOCYTES NFR BLD AUTO: 0 % (ref 0–5)
LYMPHOCYTES # BLD: 0.7 K/UL (ref 0.5–4.6)
LYMPHOCYTES NFR BLD: 14 % (ref 13–44)
MCH RBC QN AUTO: 32.8 PG (ref 26.1–32.9)
MCHC RBC AUTO-ENTMCNC: 32.8 G/DL (ref 31.4–35)
MCV RBC AUTO: 100 FL (ref 82–102)
MONOCYTES # BLD: 0.6 K/UL (ref 0.1–1.3)
MONOCYTES NFR BLD: 11 % (ref 4–12)
NEUTS SEG # BLD: 3.5 K/UL (ref 1.7–8.2)
NEUTS SEG NFR BLD: 71 % (ref 43–78)
NRBC # BLD: 0 K/UL (ref 0–0.2)
PLATELET # BLD AUTO: 176 K/UL (ref 150–450)
PMV BLD AUTO: 9.5 FL (ref 9.4–12.3)
POTASSIUM SERPL-SCNC: 4 MMOL/L (ref 3.5–5.1)
PROT SERPL-MCNC: 7.2 G/DL (ref 6.3–8.2)
RBC # BLD AUTO: 4.15 M/UL (ref 4.23–5.6)
SODIUM SERPL-SCNC: 141 MMOL/L (ref 133–143)
T4 FREE SERPL-MCNC: 0.7 NG/DL (ref 0.78–1.4)
TSH, 3RD GENERATION: 8.18 UIU/ML (ref 0.36–3.74)
WBC # BLD AUTO: 4.9 K/UL (ref 4.3–11.1)

## 2023-04-27 PROCEDURE — 96413 CHEMO IV INFUSION 1 HR: CPT

## 2023-04-27 PROCEDURE — 84439 ASSAY OF FREE THYROXINE: CPT

## 2023-04-27 PROCEDURE — 85025 COMPLETE CBC W/AUTO DIFF WBC: CPT

## 2023-04-27 PROCEDURE — 84443 ASSAY THYROID STIM HORMONE: CPT

## 2023-04-27 PROCEDURE — 2580000003 HC RX 258: Performed by: INTERNAL MEDICINE

## 2023-04-27 PROCEDURE — 80053 COMPREHEN METABOLIC PANEL: CPT

## 2023-04-27 PROCEDURE — 6360000002 HC RX W HCPCS: Performed by: INTERNAL MEDICINE

## 2023-04-27 RX ORDER — SODIUM CHLORIDE 0.9 % (FLUSH) 0.9 %
5-40 SYRINGE (ML) INJECTION PRN
Status: DISCONTINUED | OUTPATIENT
Start: 2023-04-27 | End: 2023-04-28 | Stop reason: HOSPADM

## 2023-04-27 RX ORDER — LEVOTHYROXINE SODIUM 0.03 MG/1
25 TABLET ORAL DAILY
Qty: 30 TABLET | Refills: 5 | Status: SHIPPED | OUTPATIENT
Start: 2023-04-27

## 2023-04-27 RX ORDER — SODIUM CHLORIDE 9 MG/ML
5-250 INJECTION, SOLUTION INTRAVENOUS PRN
Status: DISCONTINUED | OUTPATIENT
Start: 2023-04-27 | End: 2023-04-28 | Stop reason: HOSPADM

## 2023-04-27 RX ORDER — DIPHENHYDRAMINE HYDROCHLORIDE 50 MG/ML
50 INJECTION INTRAMUSCULAR; INTRAVENOUS
Status: DISCONTINUED | OUTPATIENT
Start: 2023-04-27 | End: 2023-04-28 | Stop reason: HOSPADM

## 2023-04-27 RX ADMIN — SODIUM CHLORIDE, PRESERVATIVE FREE 10 ML: 5 INJECTION INTRAVENOUS at 08:30

## 2023-04-27 RX ADMIN — SODIUM CHLORIDE 25 ML/HR: 9 INJECTION, SOLUTION INTRAVENOUS at 10:39

## 2023-04-27 RX ADMIN — SODIUM CHLORIDE, PRESERVATIVE FREE 10 ML: 5 INJECTION INTRAVENOUS at 11:10

## 2023-04-27 RX ADMIN — SODIUM CHLORIDE 200 MG: 9 INJECTION, SOLUTION INTRAVENOUS at 10:39

## 2023-04-27 NOTE — PROGRESS NOTES
Arrived to the [facility]. Sofía love/ Sarahi Beard completed. Patient tolerated well. Any issues or concerns during appointment: TSH over 8 T4 ordered ok to proceed per Dr. Gris Jj. Synthroid has also been called into his pharmacy and pt is aware to pick this up and the reason for taking it. Patient aware of next infusion appointment on 5/18/23 (date) at 0 (time). Patient instructed to call provider with temperature of 100.4 or greater or nausea/vomiting/ diarrhea or pain not controlled by medications  Discharged ambulatory.

## 2023-05-18 ENCOUNTER — OFFICE VISIT (OUTPATIENT)
Dept: ONCOLOGY | Age: 78
End: 2023-05-18
Payer: MEDICARE

## 2023-05-18 ENCOUNTER — HOSPITAL ENCOUNTER (OUTPATIENT)
Dept: INFUSION THERAPY | Age: 78
Discharge: HOME | End: 2023-05-18
Payer: MEDICARE

## 2023-05-18 VITALS
WEIGHT: 181.3 LBS | OXYGEN SATURATION: 99 % | HEIGHT: 69 IN | HEART RATE: 79 BPM | TEMPERATURE: 97.6 F | SYSTOLIC BLOOD PRESSURE: 138 MMHG | RESPIRATION RATE: 16 BRPM | DIASTOLIC BLOOD PRESSURE: 91 MMHG | BODY MASS INDEX: 26.85 KG/M2

## 2023-05-18 DIAGNOSIS — Z79.899 HIGH RISK MEDICATION USE: ICD-10-CM

## 2023-05-18 DIAGNOSIS — C43.71 MELANOMA OF RIGHT POSTERIOR CALF (HCC): Primary | ICD-10-CM

## 2023-05-18 DIAGNOSIS — C61 PROSTATE CANCER (HCC): ICD-10-CM

## 2023-05-18 DIAGNOSIS — C43.71 MELANOMA OF RIGHT POSTERIOR CALF (HCC): ICD-10-CM

## 2023-05-18 DIAGNOSIS — E03.2 HYPOTHYROIDISM DUE TO MEDICATION: ICD-10-CM

## 2023-05-18 LAB
ALBUMIN SERPL-MCNC: 4 G/DL (ref 3.2–4.6)
ALBUMIN/GLOB SERPL: 1.3 (ref 0.4–1.6)
ALP SERPL-CCNC: 97 U/L (ref 50–136)
ALT SERPL-CCNC: 33 U/L (ref 12–65)
ANION GAP SERPL CALC-SCNC: 6 MMOL/L (ref 2–11)
AST SERPL-CCNC: 27 U/L (ref 15–37)
BASOPHILS # BLD: 0 K/UL (ref 0–0.2)
BASOPHILS NFR BLD: 1 % (ref 0–2)
BILIRUB SERPL-MCNC: 0.5 MG/DL (ref 0.2–1.1)
BUN SERPL-MCNC: 17 MG/DL (ref 8–23)
CALCIUM SERPL-MCNC: 9.6 MG/DL (ref 8.3–10.4)
CHLORIDE SERPL-SCNC: 107 MMOL/L (ref 101–110)
CO2 SERPL-SCNC: 28 MMOL/L (ref 21–32)
CREAT SERPL-MCNC: 0.8 MG/DL (ref 0.8–1.5)
DIFFERENTIAL METHOD BLD: ABNORMAL
EOSINOPHIL # BLD: 0.2 K/UL (ref 0–0.8)
EOSINOPHIL NFR BLD: 3 % (ref 0.5–7.8)
ERYTHROCYTE [DISTWIDTH] IN BLOOD BY AUTOMATED COUNT: 12.3 % (ref 11.9–14.6)
GLOBULIN SER CALC-MCNC: 3 G/DL (ref 2.8–4.5)
GLUCOSE SERPL-MCNC: 96 MG/DL (ref 65–100)
HCT VFR BLD AUTO: 40 % (ref 41.1–50.3)
HGB BLD-MCNC: 13.4 G/DL (ref 13.6–17.2)
IMM GRANULOCYTES # BLD AUTO: 0 K/UL (ref 0–0.5)
IMM GRANULOCYTES NFR BLD AUTO: 0 % (ref 0–5)
LYMPHOCYTES # BLD: 0.8 K/UL (ref 0.5–4.6)
LYMPHOCYTES NFR BLD: 15 % (ref 13–44)
MCH RBC QN AUTO: 33.1 PG (ref 26.1–32.9)
MCHC RBC AUTO-ENTMCNC: 33.5 G/DL (ref 31.4–35)
MCV RBC AUTO: 98.8 FL (ref 82–102)
MONOCYTES # BLD: 0.6 K/UL (ref 0.1–1.3)
MONOCYTES NFR BLD: 12 % (ref 4–12)
NEUTS SEG # BLD: 3.3 K/UL (ref 1.7–8.2)
NEUTS SEG NFR BLD: 69 % (ref 43–78)
NRBC # BLD: 0 K/UL (ref 0–0.2)
PLATELET # BLD AUTO: 174 K/UL (ref 150–450)
PMV BLD AUTO: 9.6 FL (ref 9.4–12.3)
POTASSIUM SERPL-SCNC: 4.1 MMOL/L (ref 3.5–5.1)
PROT SERPL-MCNC: 7 G/DL (ref 6.3–8.2)
PSA SERPL-MCNC: 1.1 NG/ML
RBC # BLD AUTO: 4.05 M/UL (ref 4.23–5.6)
SODIUM SERPL-SCNC: 141 MMOL/L (ref 133–143)
TSH, 3RD GENERATION: 22.5 UIU/ML (ref 0.36–3)
WBC # BLD AUTO: 4.9 K/UL (ref 4.3–11.1)

## 2023-05-18 PROCEDURE — 2580000003 HC RX 258: Performed by: INTERNAL MEDICINE

## 2023-05-18 PROCEDURE — 84443 ASSAY THYROID STIM HORMONE: CPT

## 2023-05-18 PROCEDURE — 85025 COMPLETE CBC W/AUTO DIFF WBC: CPT

## 2023-05-18 PROCEDURE — 84153 ASSAY OF PSA TOTAL: CPT

## 2023-05-18 PROCEDURE — 80053 COMPREHEN METABOLIC PANEL: CPT

## 2023-05-18 PROCEDURE — 96413 CHEMO IV INFUSION 1 HR: CPT

## 2023-05-18 PROCEDURE — 6360000002 HC RX W HCPCS: Performed by: INTERNAL MEDICINE

## 2023-05-18 PROCEDURE — 36591 DRAW BLOOD OFF VENOUS DEVICE: CPT

## 2023-05-18 PROCEDURE — 99215 OFFICE O/P EST HI 40 MIN: CPT | Performed by: INTERNAL MEDICINE

## 2023-05-18 PROCEDURE — 1123F ACP DISCUSS/DSCN MKR DOCD: CPT | Performed by: INTERNAL MEDICINE

## 2023-05-18 RX ORDER — HEPARIN SODIUM (PORCINE) LOCK FLUSH IV SOLN 100 UNIT/ML 100 UNIT/ML
500 SOLUTION INTRAVENOUS PRN
OUTPATIENT
Start: 2023-06-08

## 2023-05-18 RX ORDER — SODIUM CHLORIDE 9 MG/ML
5-250 INJECTION, SOLUTION INTRAVENOUS PRN
OUTPATIENT
Start: 2023-06-08

## 2023-05-18 RX ORDER — SODIUM CHLORIDE 0.9 % (FLUSH) 0.9 %
10 SYRINGE (ML) INJECTION PRN
Status: DISCONTINUED | OUTPATIENT
Start: 2023-05-18 | End: 2023-05-19 | Stop reason: HOSPADM

## 2023-05-18 RX ORDER — ONDANSETRON 2 MG/ML
8 INJECTION INTRAMUSCULAR; INTRAVENOUS
OUTPATIENT
Start: 2023-06-08

## 2023-05-18 RX ORDER — FAMOTIDINE 10 MG/ML
20 INJECTION, SOLUTION INTRAVENOUS
OUTPATIENT
Start: 2023-06-08

## 2023-05-18 RX ORDER — EPINEPHRINE 1 MG/ML
0.3 INJECTION, SOLUTION, CONCENTRATE INTRAVENOUS PRN
Status: DISCONTINUED | OUTPATIENT
Start: 2023-05-18 | End: 2023-05-19 | Stop reason: HOSPADM

## 2023-05-18 RX ORDER — EPINEPHRINE 1 MG/ML
0.3 INJECTION, SOLUTION, CONCENTRATE INTRAVENOUS PRN
OUTPATIENT
Start: 2023-06-08

## 2023-05-18 RX ORDER — ACETAMINOPHEN 325 MG/1
650 TABLET ORAL
Status: DISCONTINUED | OUTPATIENT
Start: 2023-05-18 | End: 2023-05-19 | Stop reason: HOSPADM

## 2023-05-18 RX ORDER — MEPERIDINE HYDROCHLORIDE 25 MG/ML
12.5 INJECTION INTRAMUSCULAR; INTRAVENOUS; SUBCUTANEOUS PRN
Status: DISCONTINUED | OUTPATIENT
Start: 2023-05-18 | End: 2023-05-19 | Stop reason: HOSPADM

## 2023-05-18 RX ORDER — MEPERIDINE HYDROCHLORIDE 50 MG/ML
12.5 INJECTION INTRAMUSCULAR; INTRAVENOUS; SUBCUTANEOUS PRN
OUTPATIENT
Start: 2023-06-08

## 2023-05-18 RX ORDER — ALBUTEROL SULFATE 90 UG/1
4 AEROSOL, METERED RESPIRATORY (INHALATION) PRN
OUTPATIENT
Start: 2023-06-08

## 2023-05-18 RX ORDER — SODIUM CHLORIDE 0.9 % (FLUSH) 0.9 %
5-40 SYRINGE (ML) INJECTION PRN
OUTPATIENT
Start: 2023-06-08

## 2023-05-18 RX ORDER — SODIUM CHLORIDE 9 MG/ML
5-250 INJECTION, SOLUTION INTRAVENOUS PRN
Status: DISCONTINUED | OUTPATIENT
Start: 2023-05-18 | End: 2023-05-19 | Stop reason: HOSPADM

## 2023-05-18 RX ORDER — DIPHENHYDRAMINE HYDROCHLORIDE 50 MG/ML
50 INJECTION INTRAMUSCULAR; INTRAVENOUS
Status: DISCONTINUED | OUTPATIENT
Start: 2023-05-18 | End: 2023-05-19 | Stop reason: HOSPADM

## 2023-05-18 RX ORDER — SODIUM CHLORIDE 9 MG/ML
INJECTION, SOLUTION INTRAVENOUS CONTINUOUS
OUTPATIENT
Start: 2023-06-08

## 2023-05-18 RX ORDER — ALBUTEROL SULFATE 90 UG/1
4 AEROSOL, METERED RESPIRATORY (INHALATION) PRN
Status: DISCONTINUED | OUTPATIENT
Start: 2023-05-18 | End: 2023-05-19 | Stop reason: HOSPADM

## 2023-05-18 RX ORDER — ONDANSETRON 2 MG/ML
8 INJECTION INTRAMUSCULAR; INTRAVENOUS
Status: DISCONTINUED | OUTPATIENT
Start: 2023-05-18 | End: 2023-05-19 | Stop reason: HOSPADM

## 2023-05-18 RX ORDER — SODIUM CHLORIDE 0.9 % (FLUSH) 0.9 %
5-40 SYRINGE (ML) INJECTION PRN
Status: DISCONTINUED | OUTPATIENT
Start: 2023-05-18 | End: 2023-05-19 | Stop reason: HOSPADM

## 2023-05-18 RX ORDER — DIPHENHYDRAMINE HYDROCHLORIDE 50 MG/ML
50 INJECTION INTRAMUSCULAR; INTRAVENOUS
OUTPATIENT
Start: 2023-06-08

## 2023-05-18 RX ORDER — LEVOTHYROXINE SODIUM 0.05 MG/1
50 TABLET ORAL DAILY
Qty: 90 TABLET | Refills: 0 | Status: SHIPPED | OUTPATIENT
Start: 2023-05-18 | End: 2023-08-16

## 2023-05-18 RX ORDER — ACETAMINOPHEN 325 MG/1
650 TABLET ORAL
OUTPATIENT
Start: 2023-06-08

## 2023-05-18 RX ADMIN — SODIUM CHLORIDE 100 ML/HR: 9 INJECTION, SOLUTION INTRAVENOUS at 15:39

## 2023-05-18 RX ADMIN — SODIUM CHLORIDE, PRESERVATIVE FREE 10 ML: 5 INJECTION INTRAVENOUS at 15:39

## 2023-05-18 RX ADMIN — SODIUM CHLORIDE 200 MG: 9 INJECTION, SOLUTION INTRAVENOUS at 15:39

## 2023-05-18 RX ADMIN — SODIUM CHLORIDE, PRESERVATIVE FREE 10 ML: 5 INJECTION INTRAVENOUS at 13:45

## 2023-05-18 ASSESSMENT — PATIENT HEALTH QUESTIONNAIRE - PHQ9
1. LITTLE INTEREST OR PLEASURE IN DOING THINGS: 0
SUM OF ALL RESPONSES TO PHQ9 QUESTIONS 1 & 2: 0
2. FEELING DOWN, DEPRESSED OR HOPELESS: 0
SUM OF ALL RESPONSES TO PHQ QUESTIONS 1-9: 0

## 2023-05-18 NOTE — PATIENT INSTRUCTIONS
Patient Instructions from Today's Visit    Reason for Visit:  Follow up    Diagnosis Information:  https://[email or web address]/. net/about-us/asco-answers-patient-education-materials/qykb-ezxqrjb-doxu-sheets      Plan:  -Keytruda infusion today  -Your thyroid levels are increasing, this is a side effect from immunotherapy. We will wait for today's results and let you know if you need to increase the dose.     Follow Up:  6 weeks    Recent Lab Results:  Hospital Outpatient Visit on 05/18/2023   Component Date Value Ref Range Status    WBC 05/18/2023 4.9  4.3 - 11.1 K/uL Final    RBC 05/18/2023 4.05 (L)  4.23 - 5.6 M/uL Final    Hemoglobin 05/18/2023 13.4 (L)  13.6 - 17.2 g/dL Final    Hematocrit 05/18/2023 40.0 (L)  41.1 - 50.3 % Final    MCV 05/18/2023 98.8  82.0 - 102.0 FL Final    MCH 05/18/2023 33.1 (H)  26.1 - 32.9 PG Final    MCHC 05/18/2023 33.5  31.4 - 35.0 g/dL Final    RDW 05/18/2023 12.3  11.9 - 14.6 % Final    Platelets 65/93/1936 174  150 - 450 K/uL Final    MPV 05/18/2023 9.6  9.4 - 12.3 FL Final    nRBC 05/18/2023 0.00  0.0 - 0.2 K/uL Final    **Note: Absolute NRBC parameter is now reported with Hemogram**    Neutrophils % 05/18/2023 69  43 - 78 % Final    Lymphocytes % 05/18/2023 15  13 - 44 % Final    Monocytes % 05/18/2023 12  4.0 - 12.0 % Final    Eosinophils % 05/18/2023 3  0.5 - 7.8 % Final    Basophils % 05/18/2023 1  0.0 - 2.0 % Final    Immature Granulocytes 05/18/2023 0  0.0 - 5.0 % Final    Neutrophils Absolute 05/18/2023 3.3  1.7 - 8.2 K/UL Final    Lymphocytes Absolute 05/18/2023 0.8  0.5 - 4.6 K/UL Final    Monocytes Absolute 05/18/2023 0.6  0.1 - 1.3 K/UL Final    Eosinophils Absolute 05/18/2023 0.2  0.0 - 0.8 K/UL Final    Basophils Absolute 05/18/2023 0.0  0.0 - 0.2 K/UL Final    Absolute Immature Granulocyte 05/18/2023 0.0  0.0 - 0.5 K/UL Final    Differential Type 05/18/2023 AUTOMATED    Final         Treatment Summary has been discussed and given to patient:

## 2023-05-18 NOTE — PROGRESS NOTES
Arrived to the [facility]. Keytruda completed. Patient tolerated well. Any issues or concerns during appointment: no.  Patient aware of next infusion appointment on 6/8/23 (date) at 1 (time). Patient aware of next lab and [facility] office visit on 6/29/23 (date) at [phone] (time). Patient instructed to call provider with temperature of 100.4 or greater or nausea/vomiting/ diarrhea or pain not controlled by medications  Discharged ambulatory.

## 2023-05-18 NOTE — PROGRESS NOTES
4.23 - 5.6 M/uL    Hemoglobin 13.4 (L) 13.6 - 17.2 g/dL    Hematocrit 40.0 (L) 41.1 - 50.3 %    MCV 98.8 82.0 - 102.0 FL    MCH 33.1 (H) 26.1 - 32.9 PG    MCHC 33.5 31.4 - 35.0 g/dL    RDW 12.3 11.9 - 14.6 %    Platelets 976 825 - 993 K/uL    MPV 9.6 9.4 - 12.3 FL    nRBC 0.00 0.0 - 0.2 K/uL    Neutrophils % 69 43 - 78 %    Lymphocytes % 15 13 - 44 %    Monocytes % 12 4.0 - 12.0 %    Eosinophils % 3 0.5 - 7.8 %    Basophils % 1 0.0 - 2.0 %    Immature Granulocytes 0 0.0 - 5.0 %    Neutrophils Absolute 3.3 1.7 - 8.2 K/UL    Lymphocytes Absolute 0.8 0.5 - 4.6 K/UL    Monocytes Absolute 0.6 0.1 - 1.3 K/UL    Eosinophils Absolute 0.2 0.0 - 0.8 K/UL    Basophils Absolute 0.0 0.0 - 0.2 K/UL    Absolute Immature Granulocyte 0.0 0.0 - 0.5 K/UL    Differential Type AUTOMATED     Comprehensive Metabolic Panel    Collection Time: 05/18/23  1:36 PM   Result Value Ref Range    Sodium 141 133 - 143 mmol/L    Potassium 4.1 3.5 - 5.1 mmol/L    Chloride 107 101 - 110 mmol/L    CO2 28 21 - 32 mmol/L    Anion Gap 6 2 - 11 mmol/L    Glucose 96 65 - 100 mg/dL    BUN 17 8 - 23 MG/DL    Creatinine 0.80 0.8 - 1.5 MG/DL    Est, Glom Filt Rate >60 >60 ml/min/1.73m2    Calcium 9.6 8.3 - 10.4 MG/DL    Total Bilirubin 0.5 0.2 - 1.1 MG/DL    ALT 33 12 - 65 U/L    AST 27 15 - 37 U/L    Alk Phosphatase 97 50 - 136 U/L    Total Protein 7.0 6.3 - 8.2 g/dL    Albumin 4.0 3.2 - 4.6 g/dL    Globulin 3.0 2.8 - 4.5 g/dL    Albumin/Globulin Ratio 1.3 0.4 - 1.6     TSH    Collection Time: 05/18/23  1:36 PM   Result Value Ref Range    TSH, 3RD GENERATION 22.500 (H) 0.358 - 3 uIU/mL   PSA, Diagnostic    Collection Time: 05/18/23  1:36 PM   Result Value Ref Range    PSA 1.1 <4.0 ng/mL       Imaging:  No results found for this or any previous visit. ASSESSMENT/PLAN:   Diagnosis Orders   1. Melanoma of right posterior calf (HCC)  CBC With Auto Differential    Comprehensive metabolic panel    TSH without Reflex      2. High risk medication use        3.

## 2023-05-19 ENCOUNTER — HOSPITAL ENCOUNTER (OUTPATIENT)
Dept: ULTRASOUND IMAGING | Age: 78
Discharge: HOME | End: 2023-05-19
Payer: MEDICARE

## 2023-05-19 DIAGNOSIS — C43.71 MELANOMA OF RIGHT POSTERIOR CALF (HCC): ICD-10-CM

## 2023-05-19 PROCEDURE — 76882 US LMTD JT/FCL EVL NVASC XTR: CPT

## 2023-05-22 ENCOUNTER — OFFICE VISIT (OUTPATIENT)
Dept: SURGERY | Age: 78
End: 2023-05-22
Payer: MEDICARE

## 2023-05-22 VITALS — BODY MASS INDEX: 26.81 KG/M2 | HEIGHT: 69 IN | WEIGHT: 181 LBS

## 2023-05-22 DIAGNOSIS — C43.71 MELANOMA OF RIGHT POSTERIOR CALF (HCC): Primary | ICD-10-CM

## 2023-05-22 DIAGNOSIS — C61 PROSTATE CARCINOMA (HCC): ICD-10-CM

## 2023-05-22 PROCEDURE — 99214 OFFICE O/P EST MOD 30 MIN: CPT | Performed by: SURGERY

## 2023-05-22 PROCEDURE — 1123F ACP DISCUSS/DSCN MKR DOCD: CPT | Performed by: SURGERY

## 2023-05-22 NOTE — PROGRESS NOTES
H&P/Consult Note/Progress Note/Office Note:   Beverly Allen  MRN: [id number]  DLE:[birth year]  Age:77 y.o.    HPI: Beverly Allen is a 65 y.o. male who is s/p WLE with STSG of a cT3bN0, pT3bN1 right posterior calf melanoma and right inguinofemoral sent node excision on 10/18/22    He had a deep margin which was close but negative. We did remove the gastrocnemius fascia at the deep aspect of the incision  One of the 2 right inguinofemoral nodes was positive for metastasis. Prior to surgery he was referred by Dr. Merry Capps for evaluation of cT3bN0 malignant melanoma of the right calf. He had a shave biopsy on 8/24/22 by Dr. Boy Guzmán of the right distal calf which identified a malignant melanoma   Breslow 3.4 mm; IV; +ulceration, +regression; 20 mitosis/ sq mm; no microsatellitosis; no LVI    Lesion developed over several months and was getting larger in size. Nothing in particular made it better or worse. No associated weight loss. He takes a blood thinner but does not know the name. 10/24/22 CXR (baseline): No nodules  10/24/22 LFTs (baseline): normal        12/7/22 Whole-Body PET CT    Head and Neck: No enlarged or radiotracer avid lymph nodes. Chest: No discrete lung nodule. No mediastinal mass or lymphadenopathy. Multivessel coronary atherosclerotic calcification. Aortic atherosclerotic calcifications without aneurysmal dilation. Abdomen/Pelvis: No enlarged radiotracer avid lymph nodes in the abdomen or pelvis. Postsurgical changes from a right inguinal lymph node dissection. More laterally in the anterior proximal thighs tiny nodular focus with slight elevation of FDG uptake, maximum SUV 2.6. Penile prosthesis noted. There is a focus of asymmetric uptake in the right prostate. No aggressive appearing bone lesions. Small region of elevated tracer uptake in right mid calf, maximum SUV 5.1. Impression:  1.   Region of uptake in right mid calf

## 2023-06-08 ENCOUNTER — HOSPITAL ENCOUNTER (OUTPATIENT)
Dept: INFUSION THERAPY | Age: 78
Discharge: HOME | End: 2023-06-08
Payer: MEDICARE

## 2023-06-08 VITALS
TEMPERATURE: 98.3 F | BODY MASS INDEX: 27.11 KG/M2 | WEIGHT: 183.6 LBS | DIASTOLIC BLOOD PRESSURE: 77 MMHG | SYSTOLIC BLOOD PRESSURE: 115 MMHG | RESPIRATION RATE: 16 BRPM | OXYGEN SATURATION: 95 % | HEART RATE: 63 BPM

## 2023-06-08 DIAGNOSIS — C43.71 MELANOMA OF RIGHT POSTERIOR CALF (HCC): Primary | ICD-10-CM

## 2023-06-08 LAB
ALBUMIN SERPL-MCNC: 3.9 G/DL (ref 3.2–4.6)
ALBUMIN/GLOB SERPL: 1.2 (ref 0.4–1.6)
ALP SERPL-CCNC: 99 U/L (ref 50–136)
ALT SERPL-CCNC: 37 U/L (ref 12–65)
ANION GAP SERPL CALC-SCNC: 5 MMOL/L (ref 2–11)
AST SERPL-CCNC: 30 U/L (ref 15–37)
BASOPHILS # BLD: 0 K/UL (ref 0–0.2)
BASOPHILS NFR BLD: 1 % (ref 0–2)
BILIRUB SERPL-MCNC: 0.5 MG/DL (ref 0.2–1.1)
BUN SERPL-MCNC: 19 MG/DL (ref 8–23)
CALCIUM SERPL-MCNC: 8.8 MG/DL (ref 8.3–10.4)
CHLORIDE SERPL-SCNC: 108 MMOL/L (ref 101–110)
CO2 SERPL-SCNC: 28 MMOL/L (ref 21–32)
CREAT SERPL-MCNC: 1 MG/DL (ref 0.8–1.5)
DIFFERENTIAL METHOD BLD: ABNORMAL
EOSINOPHIL # BLD: 0.1 K/UL (ref 0–0.8)
EOSINOPHIL NFR BLD: 2 % (ref 0.5–7.8)
ERYTHROCYTE [DISTWIDTH] IN BLOOD BY AUTOMATED COUNT: 13.1 % (ref 11.9–14.6)
GLOBULIN SER CALC-MCNC: 3.2 G/DL (ref 2.8–4.5)
GLUCOSE SERPL-MCNC: 166 MG/DL (ref 65–100)
HCT VFR BLD AUTO: 38.6 % (ref 41.1–50.3)
HGB BLD-MCNC: 13.2 G/DL (ref 13.6–17.2)
IMM GRANULOCYTES # BLD AUTO: 0 K/UL (ref 0–0.5)
IMM GRANULOCYTES NFR BLD AUTO: 0 % (ref 0–5)
LYMPHOCYTES # BLD: 0.7 K/UL (ref 0.5–4.6)
LYMPHOCYTES NFR BLD: 14 % (ref 13–44)
MCH RBC QN AUTO: 33.7 PG (ref 26.1–32.9)
MCHC RBC AUTO-ENTMCNC: 34.2 G/DL (ref 31.4–35)
MCV RBC AUTO: 98.5 FL (ref 82–102)
MONOCYTES # BLD: 0.4 K/UL (ref 0.1–1.3)
MONOCYTES NFR BLD: 8 % (ref 4–12)
NEUTS SEG # BLD: 3.7 K/UL (ref 1.7–8.2)
NEUTS SEG NFR BLD: 75 % (ref 43–78)
NRBC # BLD: 0 K/UL (ref 0–0.2)
PLATELET # BLD AUTO: 193 K/UL (ref 150–450)
PMV BLD AUTO: 9.5 FL (ref 9.4–12.3)
POTASSIUM SERPL-SCNC: 3.4 MMOL/L (ref 3.5–5.1)
PROT SERPL-MCNC: 7.1 G/DL (ref 6.3–8.2)
RBC # BLD AUTO: 3.92 M/UL (ref 4.23–5.6)
SODIUM SERPL-SCNC: 141 MMOL/L (ref 133–143)
TSH, 3RD GENERATION: 29.7 UIU/ML (ref 0.36–3)
WBC # BLD AUTO: 4.9 K/UL (ref 4.3–11.1)

## 2023-06-08 PROCEDURE — 2580000003 HC RX 258: Performed by: INTERNAL MEDICINE

## 2023-06-08 PROCEDURE — 84443 ASSAY THYROID STIM HORMONE: CPT

## 2023-06-08 PROCEDURE — 80053 COMPREHEN METABOLIC PANEL: CPT

## 2023-06-08 PROCEDURE — 96413 CHEMO IV INFUSION 1 HR: CPT

## 2023-06-08 PROCEDURE — 6360000002 HC RX W HCPCS: Performed by: INTERNAL MEDICINE

## 2023-06-08 PROCEDURE — 85025 COMPLETE CBC W/AUTO DIFF WBC: CPT

## 2023-06-08 RX ORDER — SODIUM CHLORIDE 0.9 % (FLUSH) 0.9 %
5-40 SYRINGE (ML) INJECTION PRN
Status: DISCONTINUED | OUTPATIENT
Start: 2023-06-08 | End: 2023-06-09 | Stop reason: HOSPADM

## 2023-06-08 RX ORDER — SODIUM CHLORIDE 9 MG/ML
5-250 INJECTION, SOLUTION INTRAVENOUS PRN
Status: DISCONTINUED | OUTPATIENT
Start: 2023-06-08 | End: 2023-06-09 | Stop reason: HOSPADM

## 2023-06-08 RX ADMIN — SODIUM CHLORIDE, PRESERVATIVE FREE 10 ML: 5 INJECTION INTRAVENOUS at 14:55

## 2023-06-08 RX ADMIN — SODIUM CHLORIDE 200 MG: 9 INJECTION, SOLUTION INTRAVENOUS at 15:27

## 2023-06-08 RX ADMIN — SODIUM CHLORIDE 25 ML/HR: 9 INJECTION, SOLUTION INTRAVENOUS at 14:56

## 2023-06-22 DIAGNOSIS — C43.71 MELANOMA OF RIGHT POSTERIOR CALF (HCC): Primary | ICD-10-CM

## 2023-06-22 DIAGNOSIS — C61 PROSTATE CANCER (HCC): ICD-10-CM

## 2023-06-22 DIAGNOSIS — E03.2 HYPOTHYROIDISM DUE TO MEDICATION: ICD-10-CM

## 2023-06-29 ENCOUNTER — HOSPITAL ENCOUNTER (OUTPATIENT)
Dept: INFUSION THERAPY | Age: 78
Discharge: HOME | End: 2023-06-29
Payer: MEDICARE

## 2023-06-29 ENCOUNTER — OFFICE VISIT (OUTPATIENT)
Dept: ONCOLOGY | Age: 78
End: 2023-06-29
Payer: MEDICARE

## 2023-06-29 VITALS
BODY MASS INDEX: 27.11 KG/M2 | HEIGHT: 69 IN | DIASTOLIC BLOOD PRESSURE: 77 MMHG | HEART RATE: 69 BPM | SYSTOLIC BLOOD PRESSURE: 139 MMHG | OXYGEN SATURATION: 98 % | RESPIRATION RATE: 16 BRPM | WEIGHT: 183 LBS | TEMPERATURE: 97.5 F

## 2023-06-29 DIAGNOSIS — Z79.899 HIGH RISK MEDICATION USE: ICD-10-CM

## 2023-06-29 DIAGNOSIS — C43.71 MELANOMA OF RIGHT POSTERIOR CALF (HCC): Primary | ICD-10-CM

## 2023-06-29 DIAGNOSIS — C61 PROSTATE CANCER (HCC): ICD-10-CM

## 2023-06-29 DIAGNOSIS — C43.71 MELANOMA OF RIGHT POSTERIOR CALF (HCC): ICD-10-CM

## 2023-06-29 DIAGNOSIS — E03.2 HYPOTHYROIDISM DUE TO MEDICATION: ICD-10-CM

## 2023-06-29 LAB
ALBUMIN SERPL-MCNC: 4 G/DL (ref 3.2–4.6)
ALBUMIN/GLOB SERPL: 1.3 (ref 0.4–1.6)
ALP SERPL-CCNC: 103 U/L (ref 50–136)
ALT SERPL-CCNC: 40 U/L (ref 12–65)
ANION GAP SERPL CALC-SCNC: 5 MMOL/L (ref 2–11)
AST SERPL-CCNC: 38 U/L (ref 15–37)
BASOPHILS # BLD: 0 K/UL (ref 0–0.2)
BASOPHILS NFR BLD: 1 % (ref 0–2)
BILIRUB SERPL-MCNC: 0.5 MG/DL (ref 0.2–1.1)
BUN SERPL-MCNC: 24 MG/DL (ref 8–23)
CALCIUM SERPL-MCNC: 9.4 MG/DL (ref 8.3–10.4)
CHLORIDE SERPL-SCNC: 107 MMOL/L (ref 101–110)
CO2 SERPL-SCNC: 31 MMOL/L (ref 21–32)
CREAT SERPL-MCNC: 1.4 MG/DL (ref 0.8–1.5)
DIFFERENTIAL METHOD BLD: ABNORMAL
EOSINOPHIL # BLD: 0.1 K/UL (ref 0–0.8)
EOSINOPHIL NFR BLD: 3 % (ref 0.5–7.8)
ERYTHROCYTE [DISTWIDTH] IN BLOOD BY AUTOMATED COUNT: 13.2 % (ref 11.9–14.6)
GLOBULIN SER CALC-MCNC: 3 G/DL (ref 2.8–4.5)
GLUCOSE SERPL-MCNC: 93 MG/DL (ref 65–100)
HCT VFR BLD AUTO: 38.3 % (ref 41.1–50.3)
HGB BLD-MCNC: 13.1 G/DL (ref 13.6–17.2)
IMM GRANULOCYTES # BLD AUTO: 0 K/UL (ref 0–0.5)
IMM GRANULOCYTES NFR BLD AUTO: 0 % (ref 0–5)
LYMPHOCYTES # BLD: 0.7 K/UL (ref 0.5–4.6)
LYMPHOCYTES NFR BLD: 13 % (ref 13–44)
MCH RBC QN AUTO: 33.9 PG (ref 26.1–32.9)
MCHC RBC AUTO-ENTMCNC: 34.2 G/DL (ref 31.4–35)
MCV RBC AUTO: 99 FL (ref 82–102)
MONOCYTES # BLD: 0.5 K/UL (ref 0.1–1.3)
MONOCYTES NFR BLD: 9 % (ref 4–12)
NEUTS SEG # BLD: 4 K/UL (ref 1.7–8.2)
NEUTS SEG NFR BLD: 74 % (ref 43–78)
NRBC # BLD: 0 K/UL (ref 0–0.2)
PLATELET # BLD AUTO: 181 K/UL (ref 150–450)
PMV BLD AUTO: 9.3 FL (ref 9.4–12.3)
POTASSIUM SERPL-SCNC: 3.6 MMOL/L (ref 3.5–5.1)
PROT SERPL-MCNC: 7 G/DL (ref 6.3–8.2)
PSA SERPL-MCNC: 1.2 NG/ML
RBC # BLD AUTO: 3.87 M/UL (ref 4.23–5.6)
SODIUM SERPL-SCNC: 143 MMOL/L (ref 133–143)
TSH, 3RD GENERATION: 33.3 UIU/ML (ref 0.36–3)
WBC # BLD AUTO: 5.4 K/UL (ref 4.3–11.1)

## 2023-06-29 PROCEDURE — 99215 OFFICE O/P EST HI 40 MIN: CPT | Performed by: INTERNAL MEDICINE

## 2023-06-29 PROCEDURE — 96413 CHEMO IV INFUSION 1 HR: CPT

## 2023-06-29 PROCEDURE — 84443 ASSAY THYROID STIM HORMONE: CPT

## 2023-06-29 PROCEDURE — 84153 ASSAY OF PSA TOTAL: CPT

## 2023-06-29 PROCEDURE — 36591 DRAW BLOOD OFF VENOUS DEVICE: CPT

## 2023-06-29 PROCEDURE — 85025 COMPLETE CBC W/AUTO DIFF WBC: CPT

## 2023-06-29 PROCEDURE — 1123F ACP DISCUSS/DSCN MKR DOCD: CPT | Performed by: INTERNAL MEDICINE

## 2023-06-29 PROCEDURE — 2580000003 HC RX 258: Performed by: INTERNAL MEDICINE

## 2023-06-29 PROCEDURE — 80053 COMPREHEN METABOLIC PANEL: CPT

## 2023-06-29 PROCEDURE — 6360000002 HC RX W HCPCS: Performed by: INTERNAL MEDICINE

## 2023-06-29 RX ORDER — ONDANSETRON 2 MG/ML
8 INJECTION INTRAMUSCULAR; INTRAVENOUS
Status: DISCONTINUED | OUTPATIENT
Start: 2023-06-29 | End: 2023-06-30 | Stop reason: HOSPADM

## 2023-06-29 RX ORDER — ALBUTEROL SULFATE 90 UG/1
4 AEROSOL, METERED RESPIRATORY (INHALATION) PRN
OUTPATIENT
Start: 2023-08-10

## 2023-06-29 RX ORDER — DIPHENHYDRAMINE HYDROCHLORIDE 50 MG/ML
50 INJECTION INTRAMUSCULAR; INTRAVENOUS
Status: CANCELLED | OUTPATIENT
Start: 2023-06-29

## 2023-06-29 RX ORDER — ONDANSETRON 2 MG/ML
8 INJECTION INTRAMUSCULAR; INTRAVENOUS
Status: CANCELLED | OUTPATIENT
Start: 2023-06-29

## 2023-06-29 RX ORDER — SODIUM CHLORIDE 0.9 % (FLUSH) 0.9 %
5-40 SYRINGE (ML) INJECTION PRN
OUTPATIENT
Start: 2023-07-20

## 2023-06-29 RX ORDER — ACETAMINOPHEN 325 MG/1
650 TABLET ORAL
OUTPATIENT
Start: 2023-08-31

## 2023-06-29 RX ORDER — SODIUM CHLORIDE 9 MG/ML
INJECTION, SOLUTION INTRAVENOUS CONTINUOUS
OUTPATIENT
Start: 2023-08-31

## 2023-06-29 RX ORDER — FAMOTIDINE 10 MG/ML
20 INJECTION, SOLUTION INTRAVENOUS
OUTPATIENT
Start: 2023-08-10

## 2023-06-29 RX ORDER — ACETAMINOPHEN 325 MG/1
650 TABLET ORAL
Status: CANCELLED | OUTPATIENT
Start: 2023-06-29

## 2023-06-29 RX ORDER — MEPERIDINE HYDROCHLORIDE 50 MG/ML
12.5 INJECTION INTRAMUSCULAR; INTRAVENOUS; SUBCUTANEOUS PRN
Status: CANCELLED | OUTPATIENT
Start: 2023-06-29

## 2023-06-29 RX ORDER — SODIUM CHLORIDE 9 MG/ML
5-250 INJECTION, SOLUTION INTRAVENOUS PRN
Status: DISCONTINUED | OUTPATIENT
Start: 2023-06-29 | End: 2023-06-30 | Stop reason: HOSPADM

## 2023-06-29 RX ORDER — ACETAMINOPHEN 325 MG/1
650 TABLET ORAL
OUTPATIENT
Start: 2023-07-20

## 2023-06-29 RX ORDER — MEPERIDINE HYDROCHLORIDE 25 MG/ML
12.5 INJECTION INTRAMUSCULAR; INTRAVENOUS; SUBCUTANEOUS PRN
Status: DISCONTINUED | OUTPATIENT
Start: 2023-06-29 | End: 2023-06-30 | Stop reason: HOSPADM

## 2023-06-29 RX ORDER — SODIUM CHLORIDE 9 MG/ML
5-250 INJECTION, SOLUTION INTRAVENOUS PRN
OUTPATIENT
Start: 2023-07-20

## 2023-06-29 RX ORDER — DIPHENHYDRAMINE HYDROCHLORIDE 50 MG/ML
50 INJECTION INTRAMUSCULAR; INTRAVENOUS
OUTPATIENT
Start: 2023-08-31

## 2023-06-29 RX ORDER — MEPERIDINE HYDROCHLORIDE 50 MG/ML
12.5 INJECTION INTRAMUSCULAR; INTRAVENOUS; SUBCUTANEOUS PRN
OUTPATIENT
Start: 2023-07-20

## 2023-06-29 RX ORDER — HEPARIN SODIUM (PORCINE) LOCK FLUSH IV SOLN 100 UNIT/ML 100 UNIT/ML
500 SOLUTION INTRAVENOUS PRN
OUTPATIENT
Start: 2023-08-10

## 2023-06-29 RX ORDER — DIPHENHYDRAMINE HYDROCHLORIDE 50 MG/ML
50 INJECTION INTRAMUSCULAR; INTRAVENOUS
OUTPATIENT
Start: 2023-07-20

## 2023-06-29 RX ORDER — SODIUM CHLORIDE 9 MG/ML
5-250 INJECTION, SOLUTION INTRAVENOUS PRN
Status: CANCELLED | OUTPATIENT
Start: 2023-06-29

## 2023-06-29 RX ORDER — FAMOTIDINE 10 MG/ML
20 INJECTION, SOLUTION INTRAVENOUS
OUTPATIENT
Start: 2023-07-20

## 2023-06-29 RX ORDER — SODIUM CHLORIDE 0.9 % (FLUSH) 0.9 %
5-40 SYRINGE (ML) INJECTION PRN
Status: DISCONTINUED | OUTPATIENT
Start: 2023-06-29 | End: 2023-06-30 | Stop reason: HOSPADM

## 2023-06-29 RX ORDER — DIPHENHYDRAMINE HYDROCHLORIDE 50 MG/ML
50 INJECTION INTRAMUSCULAR; INTRAVENOUS
OUTPATIENT
Start: 2023-08-10

## 2023-06-29 RX ORDER — SODIUM CHLORIDE 9 MG/ML
INJECTION, SOLUTION INTRAVENOUS CONTINUOUS
OUTPATIENT
Start: 2023-07-20

## 2023-06-29 RX ORDER — ONDANSETRON 2 MG/ML
8 INJECTION INTRAMUSCULAR; INTRAVENOUS
OUTPATIENT
Start: 2023-08-10

## 2023-06-29 RX ORDER — MEPERIDINE HYDROCHLORIDE 50 MG/ML
12.5 INJECTION INTRAMUSCULAR; INTRAVENOUS; SUBCUTANEOUS PRN
OUTPATIENT
Start: 2023-08-10

## 2023-06-29 RX ORDER — ALBUTEROL SULFATE 90 UG/1
4 AEROSOL, METERED RESPIRATORY (INHALATION) PRN
Status: DISCONTINUED | OUTPATIENT
Start: 2023-06-29 | End: 2023-06-30 | Stop reason: HOSPADM

## 2023-06-29 RX ORDER — SODIUM CHLORIDE 9 MG/ML
INJECTION, SOLUTION INTRAVENOUS CONTINUOUS
OUTPATIENT
Start: 2023-08-10

## 2023-06-29 RX ORDER — ALBUTEROL SULFATE 90 UG/1
4 AEROSOL, METERED RESPIRATORY (INHALATION) PRN
OUTPATIENT
Start: 2023-08-31

## 2023-06-29 RX ORDER — HEPARIN SODIUM (PORCINE) LOCK FLUSH IV SOLN 100 UNIT/ML 100 UNIT/ML
500 SOLUTION INTRAVENOUS PRN
OUTPATIENT
Start: 2023-08-31

## 2023-06-29 RX ORDER — ONDANSETRON 2 MG/ML
8 INJECTION INTRAMUSCULAR; INTRAVENOUS
OUTPATIENT
Start: 2023-07-20

## 2023-06-29 RX ORDER — ALBUTEROL SULFATE 90 UG/1
4 AEROSOL, METERED RESPIRATORY (INHALATION) PRN
Status: CANCELLED | OUTPATIENT
Start: 2023-06-29

## 2023-06-29 RX ORDER — MEPERIDINE HYDROCHLORIDE 50 MG/ML
12.5 INJECTION INTRAMUSCULAR; INTRAVENOUS; SUBCUTANEOUS PRN
OUTPATIENT
Start: 2023-08-31

## 2023-06-29 RX ORDER — SODIUM CHLORIDE 0.9 % (FLUSH) 0.9 %
5-40 SYRINGE (ML) INJECTION PRN
Status: CANCELLED | OUTPATIENT
Start: 2023-06-29

## 2023-06-29 RX ORDER — EPINEPHRINE 1 MG/ML
0.3 INJECTION, SOLUTION, CONCENTRATE INTRAVENOUS PRN
OUTPATIENT
Start: 2023-07-20

## 2023-06-29 RX ORDER — ACETAMINOPHEN 325 MG/1
650 TABLET ORAL
OUTPATIENT
Start: 2023-08-10

## 2023-06-29 RX ORDER — HEPARIN SODIUM (PORCINE) LOCK FLUSH IV SOLN 100 UNIT/ML 100 UNIT/ML
500 SOLUTION INTRAVENOUS PRN
Status: CANCELLED | OUTPATIENT
Start: 2023-06-29

## 2023-06-29 RX ORDER — SODIUM CHLORIDE 0.9 % (FLUSH) 0.9 %
5-40 SYRINGE (ML) INJECTION PRN
OUTPATIENT
Start: 2023-08-10

## 2023-06-29 RX ORDER — EPINEPHRINE 1 MG/ML
0.3 INJECTION, SOLUTION, CONCENTRATE INTRAVENOUS PRN
OUTPATIENT
Start: 2023-08-31

## 2023-06-29 RX ORDER — ONDANSETRON 2 MG/ML
8 INJECTION INTRAMUSCULAR; INTRAVENOUS
OUTPATIENT
Start: 2023-08-31

## 2023-06-29 RX ORDER — SODIUM CHLORIDE 9 MG/ML
5-250 INJECTION, SOLUTION INTRAVENOUS PRN
OUTPATIENT
Start: 2023-08-31

## 2023-06-29 RX ORDER — EPINEPHRINE 1 MG/ML
0.3 INJECTION, SOLUTION, CONCENTRATE INTRAVENOUS PRN
Status: DISCONTINUED | OUTPATIENT
Start: 2023-06-29 | End: 2023-06-30 | Stop reason: HOSPADM

## 2023-06-29 RX ORDER — DIPHENHYDRAMINE HYDROCHLORIDE 50 MG/ML
50 INJECTION INTRAMUSCULAR; INTRAVENOUS
Status: DISCONTINUED | OUTPATIENT
Start: 2023-06-29 | End: 2023-06-30 | Stop reason: HOSPADM

## 2023-06-29 RX ORDER — EPINEPHRINE 1 MG/ML
0.3 INJECTION, SOLUTION, CONCENTRATE INTRAVENOUS PRN
Status: CANCELLED | OUTPATIENT
Start: 2023-06-29

## 2023-06-29 RX ORDER — HEPARIN SODIUM (PORCINE) LOCK FLUSH IV SOLN 100 UNIT/ML 100 UNIT/ML
500 SOLUTION INTRAVENOUS PRN
OUTPATIENT
Start: 2023-07-20

## 2023-06-29 RX ORDER — ACETAMINOPHEN 325 MG/1
650 TABLET ORAL
Status: DISCONTINUED | OUTPATIENT
Start: 2023-06-29 | End: 2023-06-30 | Stop reason: HOSPADM

## 2023-06-29 RX ORDER — SODIUM CHLORIDE 9 MG/ML
INJECTION, SOLUTION INTRAVENOUS CONTINUOUS
Status: CANCELLED | OUTPATIENT
Start: 2023-06-29

## 2023-06-29 RX ORDER — FAMOTIDINE 10 MG/ML
20 INJECTION, SOLUTION INTRAVENOUS
Status: CANCELLED | OUTPATIENT
Start: 2023-06-29

## 2023-06-29 RX ORDER — ALBUTEROL SULFATE 90 UG/1
4 AEROSOL, METERED RESPIRATORY (INHALATION) PRN
OUTPATIENT
Start: 2023-07-20

## 2023-06-29 RX ORDER — EPINEPHRINE 1 MG/ML
0.3 INJECTION, SOLUTION, CONCENTRATE INTRAVENOUS PRN
OUTPATIENT
Start: 2023-08-10

## 2023-06-29 RX ORDER — SODIUM CHLORIDE 9 MG/ML
5-250 INJECTION, SOLUTION INTRAVENOUS PRN
OUTPATIENT
Start: 2023-08-10

## 2023-06-29 RX ORDER — FAMOTIDINE 10 MG/ML
20 INJECTION, SOLUTION INTRAVENOUS
OUTPATIENT
Start: 2023-08-31

## 2023-06-29 RX ORDER — SODIUM CHLORIDE 0.9 % (FLUSH) 0.9 %
5-40 SYRINGE (ML) INJECTION PRN
OUTPATIENT
Start: 2023-08-31

## 2023-06-29 RX ORDER — LEVOTHYROXINE SODIUM 0.07 MG/1
75 TABLET ORAL DAILY
Qty: 90 TABLET | Refills: 1 | Status: SHIPPED | OUTPATIENT
Start: 2023-06-29

## 2023-06-29 RX ADMIN — SODIUM CHLORIDE, PRESERVATIVE FREE 10 ML: 5 INJECTION INTRAVENOUS at 16:35

## 2023-06-29 RX ADMIN — SODIUM CHLORIDE 25 ML/HR: 9 INJECTION, SOLUTION INTRAVENOUS at 16:30

## 2023-06-29 RX ADMIN — SODIUM CHLORIDE, PRESERVATIVE FREE 10 ML: 5 INJECTION INTRAVENOUS at 14:17

## 2023-06-29 RX ADMIN — SODIUM CHLORIDE 200 MG: 9 INJECTION, SOLUTION INTRAVENOUS at 16:40

## 2023-06-29 ASSESSMENT — PATIENT HEALTH QUESTIONNAIRE - PHQ9
SUM OF ALL RESPONSES TO PHQ QUESTIONS 1-9: 0
SUM OF ALL RESPONSES TO PHQ QUESTIONS 1-9: 0
SUM OF ALL RESPONSES TO PHQ9 QUESTIONS 1 & 2: 0
2. FEELING DOWN, DEPRESSED OR HOPELESS: 0
SUM OF ALL RESPONSES TO PHQ QUESTIONS 1-9: 0
SUM OF ALL RESPONSES TO PHQ QUESTIONS 1-9: 0
1. LITTLE INTEREST OR PLEASURE IN DOING THINGS: 0

## 2023-07-18 ENCOUNTER — HOSPITAL ENCOUNTER (OUTPATIENT)
Dept: PET IMAGING | Age: 78
Discharge: HOME | End: 2023-07-21
Payer: MEDICARE

## 2023-07-18 DIAGNOSIS — C61 PROSTATE CARCINOMA (HCC): ICD-10-CM

## 2023-07-18 DIAGNOSIS — C43.71 MELANOMA OF RIGHT POSTERIOR CALF (HCC): ICD-10-CM

## 2023-07-18 LAB
GLUCOSE BLD STRIP.AUTO-MCNC: 128 MG/DL (ref 65–100)
SERVICE CMNT-IMP: ABNORMAL

## 2023-07-18 PROCEDURE — 6360000004 HC RX CONTRAST MEDICATION: Performed by: SURGERY

## 2023-07-18 PROCEDURE — 3430000000 HC RX DIAGNOSTIC RADIOPHARMACEUTICAL: Performed by: SURGERY

## 2023-07-18 PROCEDURE — 2580000003 HC RX 258: Performed by: SURGERY

## 2023-07-18 PROCEDURE — 78816 PET IMAGE W/CT FULL BODY: CPT

## 2023-07-18 PROCEDURE — A9552 F18 FDG: HCPCS | Performed by: SURGERY

## 2023-07-18 PROCEDURE — 82962 GLUCOSE BLOOD TEST: CPT

## 2023-07-18 RX ORDER — FLUDEOXYGLUCOSE F 18 200 MCI/ML
14.9 INJECTION, SOLUTION INTRAVENOUS
Status: COMPLETED | OUTPATIENT
Start: 2023-07-18 | End: 2023-07-18

## 2023-07-18 RX ORDER — SODIUM CHLORIDE 0.9 % (FLUSH) 0.9 %
20 SYRINGE (ML) INJECTION AS NEEDED
Status: DISCONTINUED | OUTPATIENT
Start: 2023-07-18 | End: 2023-07-22 | Stop reason: HOSPADM

## 2023-07-18 RX ADMIN — DIATRIZOATE MEGLUMINE AND DIATRIZOATE SODIUM 10 ML: 660; 100 LIQUID ORAL; RECTAL at 07:40

## 2023-07-18 RX ADMIN — SODIUM CHLORIDE, PRESERVATIVE FREE 20 ML: 5 INJECTION INTRAVENOUS at 07:40

## 2023-07-18 RX ADMIN — FLUDEOXYGLUCOSE F 18 14.9 MILLICURIE: 200 INJECTION, SOLUTION INTRAVENOUS at 07:40

## 2023-07-20 ENCOUNTER — HOSPITAL ENCOUNTER (OUTPATIENT)
Dept: INFUSION THERAPY | Age: 78
Discharge: HOME | End: 2023-07-20
Payer: MEDICARE

## 2023-07-20 VITALS
RESPIRATION RATE: 16 BRPM | HEART RATE: 89 BPM | DIASTOLIC BLOOD PRESSURE: 72 MMHG | OXYGEN SATURATION: 96 % | TEMPERATURE: 98.4 F | SYSTOLIC BLOOD PRESSURE: 103 MMHG | WEIGHT: 185.4 LBS | BODY MASS INDEX: 27.38 KG/M2

## 2023-07-20 DIAGNOSIS — C43.71 MELANOMA OF RIGHT POSTERIOR CALF (HCC): Primary | ICD-10-CM

## 2023-07-20 DIAGNOSIS — B37.2 YEAST DERMATITIS: Primary | ICD-10-CM

## 2023-07-20 LAB
ALBUMIN SERPL-MCNC: 4.2 G/DL (ref 3.2–4.6)
ALBUMIN/GLOB SERPL: 1.4 (ref 0.4–1.6)
ALP SERPL-CCNC: 88 U/L (ref 50–136)
ALT SERPL-CCNC: 40 U/L (ref 12–65)
ANION GAP SERPL CALC-SCNC: 6 MMOL/L (ref 2–11)
AST SERPL-CCNC: 34 U/L (ref 15–37)
BASOPHILS # BLD: 0.1 K/UL (ref 0–0.2)
BASOPHILS NFR BLD: 1 % (ref 0–2)
BILIRUB SERPL-MCNC: 0.6 MG/DL (ref 0.2–1.1)
BUN SERPL-MCNC: 22 MG/DL (ref 8–23)
CALCIUM SERPL-MCNC: 9.7 MG/DL (ref 8.3–10.4)
CHLORIDE SERPL-SCNC: 107 MMOL/L (ref 101–110)
CO2 SERPL-SCNC: 26 MMOL/L (ref 21–32)
CREAT SERPL-MCNC: 1 MG/DL (ref 0.8–1.5)
DIFFERENTIAL METHOD BLD: ABNORMAL
EOSINOPHIL # BLD: 0.1 K/UL (ref 0–0.8)
EOSINOPHIL NFR BLD: 2 % (ref 0.5–7.8)
ERYTHROCYTE [DISTWIDTH] IN BLOOD BY AUTOMATED COUNT: 13.5 % (ref 11.9–14.6)
GLOBULIN SER CALC-MCNC: 2.9 G/DL (ref 2.8–4.5)
GLUCOSE SERPL-MCNC: 94 MG/DL (ref 65–100)
HCT VFR BLD AUTO: 37.8 % (ref 41.1–50.3)
HGB BLD-MCNC: 13 G/DL (ref 13.6–17.2)
IMM GRANULOCYTES # BLD AUTO: 0 K/UL (ref 0–0.5)
IMM GRANULOCYTES NFR BLD AUTO: 1 % (ref 0–5)
LYMPHOCYTES # BLD: 0.8 K/UL (ref 0.5–4.6)
LYMPHOCYTES NFR BLD: 14 % (ref 13–44)
MCH RBC QN AUTO: 34.1 PG (ref 26.1–32.9)
MCHC RBC AUTO-ENTMCNC: 34.4 G/DL (ref 31.4–35)
MCV RBC AUTO: 99.2 FL (ref 82–102)
MONOCYTES # BLD: 0.6 K/UL (ref 0.1–1.3)
MONOCYTES NFR BLD: 11 % (ref 4–12)
NEUTS SEG # BLD: 4.3 K/UL (ref 1.7–8.2)
NEUTS SEG NFR BLD: 71 % (ref 43–78)
NRBC # BLD: 0 K/UL (ref 0–0.2)
PLATELET # BLD AUTO: 201 K/UL (ref 150–450)
PMV BLD AUTO: 9.5 FL (ref 9.4–12.3)
POTASSIUM SERPL-SCNC: 3.6 MMOL/L (ref 3.5–5.1)
PROT SERPL-MCNC: 7.1 G/DL (ref 6.3–8.2)
RBC # BLD AUTO: 3.81 M/UL (ref 4.23–5.6)
SODIUM SERPL-SCNC: 139 MMOL/L (ref 133–143)
TSH, 3RD GENERATION: 28.1 UIU/ML (ref 0.36–3)
WBC # BLD AUTO: 5.9 K/UL (ref 4.3–11.1)

## 2023-07-20 PROCEDURE — 6360000002 HC RX W HCPCS: Performed by: INTERNAL MEDICINE

## 2023-07-20 PROCEDURE — 85025 COMPLETE CBC W/AUTO DIFF WBC: CPT

## 2023-07-20 PROCEDURE — 96413 CHEMO IV INFUSION 1 HR: CPT

## 2023-07-20 PROCEDURE — 84443 ASSAY THYROID STIM HORMONE: CPT

## 2023-07-20 PROCEDURE — 2580000003 HC RX 258: Performed by: INTERNAL MEDICINE

## 2023-07-20 PROCEDURE — 80053 COMPREHEN METABOLIC PANEL: CPT

## 2023-07-20 RX ORDER — HEPARIN 100 UNIT/ML
500 SYRINGE INTRAVENOUS PRN
Status: DISCONTINUED | OUTPATIENT
Start: 2023-07-20 | End: 2023-07-21 | Stop reason: HOSPADM

## 2023-07-20 RX ORDER — SODIUM CHLORIDE 9 MG/ML
5-250 INJECTION, SOLUTION INTRAVENOUS PRN
Status: DISCONTINUED | OUTPATIENT
Start: 2023-07-20 | End: 2023-07-21 | Stop reason: HOSPADM

## 2023-07-20 RX ORDER — NYSTATIN 100000 [USP'U]/G
POWDER TOPICAL
Qty: 60 G | Refills: 1 | Status: SHIPPED | OUTPATIENT
Start: 2023-07-20

## 2023-07-20 RX ORDER — SODIUM CHLORIDE 0.9 % (FLUSH) 0.9 %
5-40 SYRINGE (ML) INJECTION PRN
Status: DISCONTINUED | OUTPATIENT
Start: 2023-07-20 | End: 2023-07-21 | Stop reason: HOSPADM

## 2023-07-20 RX ADMIN — SODIUM CHLORIDE 200 MG: 9 INJECTION, SOLUTION INTRAVENOUS at 15:21

## 2023-07-20 RX ADMIN — SODIUM CHLORIDE 100 ML/HR: 9 INJECTION, SOLUTION INTRAVENOUS at 14:00

## 2023-07-20 NOTE — PROGRESS NOTES
Arrived to the infusion center. Merline Black completed without signs of adverse reaction. New complaint of itching rash. in groins. Seen by Ivette--navigator. Felt it was a yeast infection. Medication to be ordered. Aware of his next appointment on 8/10 at 1500. Discharged ambulatory in satisfactory condition.

## 2023-07-24 ENCOUNTER — OFFICE VISIT (OUTPATIENT)
Dept: SURGERY | Age: 78
End: 2023-07-24
Payer: MEDICARE

## 2023-07-24 DIAGNOSIS — C43.71 MELANOMA OF RIGHT POSTERIOR CALF (HCC): Primary | ICD-10-CM

## 2023-07-24 DIAGNOSIS — C77.9 MELANOMA METASTATIC TO LYMPH NODE (HCC): ICD-10-CM

## 2023-07-24 DIAGNOSIS — C43.9 MELANOMA METASTATIC TO LYMPH NODE (HCC): ICD-10-CM

## 2023-07-24 PROCEDURE — 99214 OFFICE O/P EST MOD 30 MIN: CPT | Performed by: SURGERY

## 2023-07-24 PROCEDURE — 1123F ACP DISCUSS/DSCN MKR DOCD: CPT | Performed by: SURGERY

## 2023-08-03 DIAGNOSIS — Z79.899 HIGH RISK MEDICATION USE: Primary | ICD-10-CM

## 2023-08-10 ENCOUNTER — HOSPITAL ENCOUNTER (OUTPATIENT)
Dept: INFUSION THERAPY | Age: 78
Discharge: HOME | End: 2023-08-10
Payer: MEDICARE

## 2023-08-10 ENCOUNTER — OFFICE VISIT (OUTPATIENT)
Dept: ONCOLOGY | Age: 78
End: 2023-08-10
Payer: MEDICARE

## 2023-08-10 VITALS
BODY MASS INDEX: 28.3 KG/M2 | SYSTOLIC BLOOD PRESSURE: 129 MMHG | HEIGHT: 69 IN | TEMPERATURE: 97.6 F | RESPIRATION RATE: 16 BRPM | WEIGHT: 191.1 LBS | OXYGEN SATURATION: 100 % | DIASTOLIC BLOOD PRESSURE: 72 MMHG | HEART RATE: 71 BPM

## 2023-08-10 DIAGNOSIS — Z79.899 HIGH RISK MEDICATION USE: ICD-10-CM

## 2023-08-10 DIAGNOSIS — C43.71 MELANOMA OF RIGHT POSTERIOR CALF (HCC): Primary | ICD-10-CM

## 2023-08-10 DIAGNOSIS — M79.605 PAIN IN BOTH LOWER EXTREMITIES: ICD-10-CM

## 2023-08-10 DIAGNOSIS — M79.604 PAIN IN BOTH LOWER EXTREMITIES: ICD-10-CM

## 2023-08-10 DIAGNOSIS — C43.71 MELANOMA OF RIGHT POSTERIOR CALF (HCC): ICD-10-CM

## 2023-08-10 DIAGNOSIS — E03.2 HYPOTHYROIDISM DUE TO MEDICATION: ICD-10-CM

## 2023-08-10 DIAGNOSIS — R60.0 LOWER EXTREMITY EDEMA: ICD-10-CM

## 2023-08-10 LAB
ALBUMIN SERPL-MCNC: 4.1 G/DL (ref 3.2–4.6)
ALBUMIN/GLOB SERPL: 1.4 (ref 0.4–1.6)
ALP SERPL-CCNC: 83 U/L (ref 50–136)
ALT SERPL-CCNC: 30 U/L (ref 12–65)
ANION GAP SERPL CALC-SCNC: 6 MMOL/L (ref 2–11)
AST SERPL-CCNC: 24 U/L (ref 15–37)
BASOPHILS # BLD: 0 K/UL (ref 0–0.2)
BASOPHILS NFR BLD: 1 % (ref 0–2)
BILIRUB SERPL-MCNC: 0.5 MG/DL (ref 0.2–1.1)
BUN SERPL-MCNC: 18 MG/DL (ref 8–23)
CALCIUM SERPL-MCNC: 9.4 MG/DL (ref 8.3–10.4)
CHLORIDE SERPL-SCNC: 110 MMOL/L (ref 101–110)
CO2 SERPL-SCNC: 29 MMOL/L (ref 21–32)
CREAT SERPL-MCNC: 1 MG/DL (ref 0.8–1.5)
DIFFERENTIAL METHOD BLD: ABNORMAL
EOSINOPHIL # BLD: 0.1 K/UL (ref 0–0.8)
EOSINOPHIL NFR BLD: 2 % (ref 0.5–7.8)
ERYTHROCYTE [DISTWIDTH] IN BLOOD BY AUTOMATED COUNT: 13.8 % (ref 11.9–14.6)
GLOBULIN SER CALC-MCNC: 3 G/DL (ref 2.8–4.5)
GLUCOSE SERPL-MCNC: 137 MG/DL (ref 65–100)
HCT VFR BLD AUTO: 36.9 % (ref 41.1–50.3)
HGB BLD-MCNC: 12.5 G/DL (ref 13.6–17.2)
IMM GRANULOCYTES # BLD AUTO: 0 K/UL (ref 0–0.5)
IMM GRANULOCYTES NFR BLD AUTO: 0 % (ref 0–5)
LYMPHOCYTES # BLD: 0.9 K/UL (ref 0.5–4.6)
LYMPHOCYTES NFR BLD: 15 % (ref 13–44)
MCH RBC QN AUTO: 34.2 PG (ref 26.1–32.9)
MCHC RBC AUTO-ENTMCNC: 33.9 G/DL (ref 31.4–35)
MCV RBC AUTO: 101.1 FL (ref 82–102)
MONOCYTES # BLD: 0.4 K/UL (ref 0.1–1.3)
MONOCYTES NFR BLD: 7 % (ref 4–12)
NEUTS SEG # BLD: 4.3 K/UL (ref 1.7–8.2)
NEUTS SEG NFR BLD: 75 % (ref 43–78)
NRBC # BLD: 0 K/UL (ref 0–0.2)
PLATELET # BLD AUTO: 207 K/UL (ref 150–450)
PMV BLD AUTO: 9.6 FL (ref 9.4–12.3)
POTASSIUM SERPL-SCNC: 3.6 MMOL/L (ref 3.5–5.1)
PROT SERPL-MCNC: 7.1 G/DL (ref 6.3–8.2)
RBC # BLD AUTO: 3.65 M/UL (ref 4.23–5.6)
SODIUM SERPL-SCNC: 145 MMOL/L (ref 133–143)
TSH, 3RD GENERATION: 23.9 UIU/ML (ref 0.36–3.74)
WBC # BLD AUTO: 5.7 K/UL (ref 4.3–11.1)

## 2023-08-10 PROCEDURE — 96413 CHEMO IV INFUSION 1 HR: CPT

## 2023-08-10 PROCEDURE — 80053 COMPREHEN METABOLIC PANEL: CPT

## 2023-08-10 PROCEDURE — 1123F ACP DISCUSS/DSCN MKR DOCD: CPT | Performed by: INTERNAL MEDICINE

## 2023-08-10 PROCEDURE — 84443 ASSAY THYROID STIM HORMONE: CPT

## 2023-08-10 PROCEDURE — 2580000003 HC RX 258: Performed by: INTERNAL MEDICINE

## 2023-08-10 PROCEDURE — 6360000002 HC RX W HCPCS: Performed by: INTERNAL MEDICINE

## 2023-08-10 PROCEDURE — 99215 OFFICE O/P EST HI 40 MIN: CPT | Performed by: INTERNAL MEDICINE

## 2023-08-10 PROCEDURE — 36591 DRAW BLOOD OFF VENOUS DEVICE: CPT

## 2023-08-10 PROCEDURE — 85025 COMPLETE CBC W/AUTO DIFF WBC: CPT

## 2023-08-10 RX ORDER — DIPHENHYDRAMINE HYDROCHLORIDE 50 MG/ML
50 INJECTION INTRAMUSCULAR; INTRAVENOUS
Status: DISCONTINUED | OUTPATIENT
Start: 2023-08-10 | End: 2023-08-11 | Stop reason: HOSPADM

## 2023-08-10 RX ORDER — ONDANSETRON 2 MG/ML
8 INJECTION INTRAMUSCULAR; INTRAVENOUS
Status: DISCONTINUED | OUTPATIENT
Start: 2023-08-10 | End: 2023-08-11 | Stop reason: HOSPADM

## 2023-08-10 RX ORDER — ALBUTEROL SULFATE 90 UG/1
4 AEROSOL, METERED RESPIRATORY (INHALATION) PRN
Status: DISCONTINUED | OUTPATIENT
Start: 2023-08-10 | End: 2023-08-11 | Stop reason: HOSPADM

## 2023-08-10 RX ORDER — SODIUM CHLORIDE 0.9 % (FLUSH) 0.9 %
5-40 SYRINGE (ML) INJECTION PRN
Status: DISCONTINUED | OUTPATIENT
Start: 2023-08-10 | End: 2023-08-11 | Stop reason: HOSPADM

## 2023-08-10 RX ORDER — EPINEPHRINE 1 MG/ML
0.3 INJECTION, SOLUTION, CONCENTRATE INTRAVENOUS PRN
Status: DISCONTINUED | OUTPATIENT
Start: 2023-08-10 | End: 2023-08-11 | Stop reason: HOSPADM

## 2023-08-10 RX ORDER — SODIUM CHLORIDE 9 MG/ML
5-250 INJECTION, SOLUTION INTRAVENOUS PRN
Status: DISCONTINUED | OUTPATIENT
Start: 2023-08-10 | End: 2023-08-11 | Stop reason: HOSPADM

## 2023-08-10 RX ORDER — ACETAMINOPHEN 325 MG/1
650 TABLET ORAL
Status: DISCONTINUED | OUTPATIENT
Start: 2023-08-10 | End: 2023-08-11 | Stop reason: HOSPADM

## 2023-08-10 RX ORDER — MEPERIDINE HYDROCHLORIDE 25 MG/ML
12.5 INJECTION INTRAMUSCULAR; INTRAVENOUS; SUBCUTANEOUS PRN
Status: DISCONTINUED | OUTPATIENT
Start: 2023-08-10 | End: 2023-08-11 | Stop reason: HOSPADM

## 2023-08-10 RX ORDER — LEVOTHYROXINE SODIUM 0.12 MG/1
125 TABLET ORAL DAILY
Qty: 30 TABLET | Refills: 2 | Status: SHIPPED | OUTPATIENT
Start: 2023-08-10

## 2023-08-10 RX ADMIN — SODIUM CHLORIDE 200 MG: 9 INJECTION, SOLUTION INTRAVENOUS at 15:44

## 2023-08-10 RX ADMIN — SODIUM CHLORIDE, PRESERVATIVE FREE 10 ML: 5 INJECTION INTRAVENOUS at 15:29

## 2023-08-10 RX ADMIN — SODIUM CHLORIDE, PRESERVATIVE FREE 10 ML: 5 INJECTION INTRAVENOUS at 13:59

## 2023-08-10 RX ADMIN — SODIUM CHLORIDE 50 ML/HR: 9 INJECTION, SOLUTION INTRAVENOUS at 15:30

## 2023-08-10 NOTE — PROGRESS NOTES
Patient arrived to port lab for port access and lab draw   [address] accessed and labs drawn per protocol   *Port remains accessed.  Patient discharged from port lab ambulatory*

## 2023-08-10 NOTE — PROGRESS NOTES
Arrived to the [address] [place] [address]. [facility]. keytruda completed. Patient tolerated well. Any issues or concerns during appointment: none. Patient aware of next infusion appointment on  8/31/2023 (date) at 1400 (time). Patient aware of next lab and [facility] office visit on 9/21/2023 (date) at 1500 (time). Patient instructed to call provider with temperature of 100.4 or greater or nausea/vomiting/ diarrhea or pain not controlled by medications  Discharged ambulatory. No

## 2023-08-10 NOTE — PROGRESS NOTES
[facility]: Office Visit Progress Note    Chief Complaint:    Melanoma of right posterior calf pT3bN1    History of Present Illness:  Reason for Referral: Melanoma of right posterior calf     Referring Provider: Esmer Zaomra MD     Primary Care Provider: Marilin Murillo MD     Family History of Cancer/Hematologic Disorders: Family history is significant for mother with cervical cancer. Presenting Symptoms: Enlarging skin lesion to right calf     Mr. Randal Alan is a 77 y.o. white male with a history of tobacco use (former smoker), atrial fibrillation, cardioversion, HLD, HTN, cataract extraction, ED, penile prosthesis, prostate cancer, elevated MCV, B12 deficiency, IFG, right optic neuritis, kidney stone, and vertigo, who was recently diagnosed with melanoma of the right posterior calf. A shave biopsy of a lesion to patient's right distal calf done by Dermatologist, Dr. Melissa Huang, on 8/24/22 identified a malignant melanoma, Breslow 3.4 mm; IV; +ulceration, +regression; 20 mitosis/ sq mm; no microscopic satellitosis; no LVI. The lesion had developed over several months and was getting larger. Patient was referred to Surgeon, Dr. Aleksander Green, and evaluated in consultation on 9/21/22. He was assessed with signs and symptoms of a pT3bN0 right calf malignant melanoma and recommended for wide excision with split-thickness skin grafting and sentinel node excision. He was taken to the OR on 10/18/22 and underwent radical resection of soft tissue of right posterior calf for invasive melanoma; right inguinofemoral sentinel node excision x 2 following intraoperative mapping and identification; split-thickness skin grafting from right anterior thigh to right posterior calf; and advancement flap closure of right thigh graft harvest site. Two right inguinofemoral nodes and wide excision of right posterior calf was sent to Pathology for review.  Final pathology revealed malignant melanoma

## 2023-08-10 NOTE — PATIENT INSTRUCTIONS
Patient Instructions from Today's Visit    Reason for Visit:  Follow up-Melanoma    Diagnosis Information:  https://www.[employer]/. net/about-us/asco-answers-patient-education-materials/ealu-dghfkmr-puqu-sheets      Plan:  The PET scan showed no signs of cancer  We will get a doppler study of both of your legs. We will refer you to an orthopedist for your leg pain. Proceed with Keytruda.   Follow Up:  Infusion only in 3 weeks  Dr Dianna Eisenberg in 6 weeks    Recent Lab Results:  Hospital Outpatient Visit on 08/10/2023   Component Date Value Ref Range Status    WBC 08/10/2023 5.7  4.3 - 11.1 K/uL Final    RBC 08/10/2023 3.65 (L)  4.23 - 5.6 M/uL Final    Hemoglobin 08/10/2023 12.5 (L)  13.6 - 17.2 g/dL Final    Hematocrit 08/10/2023 36.9 (L)  41.1 - 50.3 % Final    MCV 08/10/2023 101.1  82.0 - 102.0 FL Final    MCH 08/10/2023 34.2 (H)  26.1 - 32.9 PG Final    MCHC 08/10/2023 33.9  31.4 - 35.0 g/dL Final    RDW 08/10/2023 13.8  11.9 - 14.6 % Final    Platelets 84/18/0250 207  150 - 450 K/uL Final    MPV 08/10/2023 9.6  9.4 - 12.3 FL Final    nRBC 08/10/2023 0.00  0.0 - 0.2 K/uL Final    **Note: Absolute NRBC parameter is now reported with Hemogram**    Neutrophils % 08/10/2023 75  43 - 78 % Final    Lymphocytes % 08/10/2023 15  13 - 44 % Final    Monocytes % 08/10/2023 7  4.0 - 12.0 % Final    Eosinophils % 08/10/2023 2  0.5 - 7.8 % Final    Basophils % 08/10/2023 1  0.0 - 2.0 % Final    Immature Granulocytes 08/10/2023 0  0.0 - 5.0 % Final    Neutrophils Absolute 08/10/2023 4.3  1.7 - 8.2 K/UL Final    Lymphocytes Absolute 08/10/2023 0.9  0.5 - 4.6 K/UL Final    Monocytes Absolute 08/10/2023 0.4  0.1 - 1.3 K/UL Final    Eosinophils Absolute 08/10/2023 0.1  0.0 - 0.8 K/UL Final    Basophils Absolute 08/10/2023 0.0  0.0 - 0.2 K/UL Final    Absolute Immature Granulocyte 08/10/2023 0.0  0.0 - 0.5 K/UL Final    Differential Type 08/10/2023 AUTOMATED    Final         Treatment Summary has been discussed and given to patient:

## 2023-08-14 ENCOUNTER — HOSPITAL ENCOUNTER (OUTPATIENT)
Dept: ULTRASOUND IMAGING | Age: 78
Discharge: HOME | End: 2023-08-17
Attending: INTERNAL MEDICINE
Payer: MEDICARE

## 2023-08-14 DIAGNOSIS — R60.0 LOWER EXTREMITY EDEMA: ICD-10-CM

## 2023-08-14 PROCEDURE — 93970 EXTREMITY STUDY: CPT

## 2023-08-16 ENCOUNTER — OFFICE VISIT (OUTPATIENT)
Dept: ORTHOPEDIC SURGERY | Age: 78
End: 2023-08-16
Payer: MEDICARE

## 2023-08-16 VITALS — HEIGHT: 69 IN | WEIGHT: 191 LBS | BODY MASS INDEX: 28.29 KG/M2

## 2023-08-16 DIAGNOSIS — M54.50 LOW BACK PAIN, UNSPECIFIED BACK PAIN LATERALITY, UNSPECIFIED CHRONICITY, UNSPECIFIED WHETHER SCIATICA PRESENT: Primary | ICD-10-CM

## 2023-08-16 DIAGNOSIS — M79.605 LEFT LEG PAIN: ICD-10-CM

## 2023-08-16 PROCEDURE — 99203 OFFICE O/P NEW LOW 30 MIN: CPT | Performed by: ORTHOPAEDIC SURGERY

## 2023-08-16 PROCEDURE — 1123F ACP DISCUSS/DSCN MKR DOCD: CPT | Performed by: ORTHOPAEDIC SURGERY

## 2023-08-16 NOTE — PROGRESS NOTES
[facility]  Consultation Note    Patient ID:  Name: Latrice Newby  MRN: [id number]  AGE: 77 y.o.  : 1945    Date of Consultation:  2023    CC:   Chief Complaint   Patient presents with    New Patient    Lower Back Pain         HPI: This is a new patient visit on Latrice Newby, he is a 21-year-old white male who complains of low back and left leg pain these had for several weeks without any injury. He describes the pain is going down the posterior leg to the distal calf without any numbness tingling or weakness and no pain in the right leg. The pain comes and goes and is brought on by standing and walking especially weightbearing on the left leg. His pain goes away when he sits down. Been treating this with some type of arthritis pill from [facility] and has tried ibuprofen as well and has been using heat rubs. Past Medical History Includes: He is not a diabetic but he does have atrial fibrillation but no other heart problems and is on Eliquis. He was recently diagnosed with melanoma on his leg and had surgical removal and has had some further work-up with PET scans which were negative and most recently a Doppler study of his legs get because he had some swelling and they did not see any DVT. Family History:   Family History   Problem Relation Age of Onset    Cancer Mother       Social History:   Social History     Tobacco Use    Smoking status: Former     Types: Cigarettes     Quit date: 1959     Years since quittin.6    Smokeless tobacco: Never   Substance Use Topics    Alcohol use: Not Currently       ALLERGIES:   Allergies   Allergen Reactions    Lisinopril Cough        Patient Medications    Current Outpatient Medications   Medication Sig    levothyroxine (SYNTHROID) 125 MCG tablet Take 1 tablet by mouth daily    nystatin (MYCOSTATIN) 293641 UNIT/GM powder Apply 3 times daily to affected areas.     tamsulosin (FLOMAX) 0.4 MG capsule 1 capsule

## 2023-08-31 ENCOUNTER — HOSPITAL ENCOUNTER (OUTPATIENT)
Dept: INFUSION THERAPY | Age: 78
Discharge: HOME | End: 2023-08-31
Payer: MEDICARE

## 2023-08-31 VITALS
BODY MASS INDEX: 27.11 KG/M2 | DIASTOLIC BLOOD PRESSURE: 69 MMHG | SYSTOLIC BLOOD PRESSURE: 113 MMHG | TEMPERATURE: 97.7 F | WEIGHT: 183.6 LBS | RESPIRATION RATE: 18 BRPM | HEART RATE: 90 BPM | OXYGEN SATURATION: 97 %

## 2023-08-31 DIAGNOSIS — C43.71 MELANOMA OF RIGHT POSTERIOR CALF (HCC): Primary | ICD-10-CM

## 2023-08-31 LAB
ALBUMIN SERPL-MCNC: 4 G/DL (ref 3.2–4.6)
ALBUMIN/GLOB SERPL: 1.2 (ref 0.4–1.6)
ALP SERPL-CCNC: 85 U/L (ref 50–136)
ALT SERPL-CCNC: 34 U/L (ref 12–65)
ANION GAP SERPL CALC-SCNC: 3 MMOL/L (ref 2–11)
AST SERPL-CCNC: 25 U/L (ref 15–37)
BASOPHILS # BLD: 0 K/UL (ref 0–0.2)
BASOPHILS NFR BLD: 1 % (ref 0–2)
BILIRUB SERPL-MCNC: 0.5 MG/DL (ref 0.2–1.1)
BUN SERPL-MCNC: 24 MG/DL (ref 8–23)
CALCIUM SERPL-MCNC: 9.1 MG/DL (ref 8.3–10.4)
CHLORIDE SERPL-SCNC: 107 MMOL/L (ref 101–110)
CO2 SERPL-SCNC: 30 MMOL/L (ref 21–32)
CREAT SERPL-MCNC: 1.1 MG/DL (ref 0.8–1.5)
DIFFERENTIAL METHOD BLD: ABNORMAL
EOSINOPHIL # BLD: 0.1 K/UL (ref 0–0.8)
EOSINOPHIL NFR BLD: 3 % (ref 0.5–7.8)
ERYTHROCYTE [DISTWIDTH] IN BLOOD BY AUTOMATED COUNT: 13 % (ref 11.9–14.6)
GLOBULIN SER CALC-MCNC: 3.4 G/DL (ref 2.8–4.5)
GLUCOSE SERPL-MCNC: 102 MG/DL (ref 65–100)
HCT VFR BLD AUTO: 38.5 % (ref 41.1–50.3)
HGB BLD-MCNC: 13.2 G/DL (ref 13.6–17.2)
IMM GRANULOCYTES # BLD AUTO: 0 K/UL (ref 0–0.5)
IMM GRANULOCYTES NFR BLD AUTO: 0 % (ref 0–5)
LYMPHOCYTES # BLD: 0.8 K/UL (ref 0.5–4.6)
LYMPHOCYTES NFR BLD: 16 % (ref 13–44)
MCH RBC QN AUTO: 34.8 PG (ref 26.1–32.9)
MCHC RBC AUTO-ENTMCNC: 34.3 G/DL (ref 31.4–35)
MCV RBC AUTO: 101.6 FL (ref 82–102)
MONOCYTES # BLD: 0.6 K/UL (ref 0.1–1.3)
MONOCYTES NFR BLD: 12 % (ref 4–12)
NEUTS SEG # BLD: 3.4 K/UL (ref 1.7–8.2)
NEUTS SEG NFR BLD: 68 % (ref 43–78)
NRBC # BLD: 0 K/UL (ref 0–0.2)
PLATELET # BLD AUTO: 221 K/UL (ref 150–450)
PMV BLD AUTO: 9.8 FL (ref 9.4–12.3)
POTASSIUM SERPL-SCNC: 3.9 MMOL/L (ref 3.5–5.1)
PROT SERPL-MCNC: 7.4 G/DL (ref 6.3–8.2)
RBC # BLD AUTO: 3.79 M/UL (ref 4.23–5.6)
SODIUM SERPL-SCNC: 140 MMOL/L (ref 133–143)
TSH, 3RD GENERATION: 11.9 UIU/ML (ref 0.36–3.74)
WBC # BLD AUTO: 5 K/UL (ref 4.3–11.1)

## 2023-08-31 PROCEDURE — 85025 COMPLETE CBC W/AUTO DIFF WBC: CPT

## 2023-08-31 PROCEDURE — 2580000003 HC RX 258: Performed by: INTERNAL MEDICINE

## 2023-08-31 PROCEDURE — 96413 CHEMO IV INFUSION 1 HR: CPT

## 2023-08-31 PROCEDURE — 6360000002 HC RX W HCPCS: Performed by: INTERNAL MEDICINE

## 2023-08-31 PROCEDURE — 84443 ASSAY THYROID STIM HORMONE: CPT

## 2023-08-31 PROCEDURE — 80053 COMPREHEN METABOLIC PANEL: CPT

## 2023-08-31 RX ORDER — SODIUM CHLORIDE 9 MG/ML
5-250 INJECTION, SOLUTION INTRAVENOUS PRN
Status: DISCONTINUED | OUTPATIENT
Start: 2023-08-31 | End: 2023-09-01 | Stop reason: HOSPADM

## 2023-08-31 RX ORDER — ACETAMINOPHEN 325 MG/1
650 TABLET ORAL
Status: DISCONTINUED | OUTPATIENT
Start: 2023-08-31 | End: 2023-09-01 | Stop reason: HOSPADM

## 2023-08-31 RX ORDER — SODIUM CHLORIDE 0.9 % (FLUSH) 0.9 %
5-40 SYRINGE (ML) INJECTION PRN
Status: DISCONTINUED | OUTPATIENT
Start: 2023-08-31 | End: 2023-09-01 | Stop reason: HOSPADM

## 2023-08-31 RX ORDER — EPINEPHRINE 1 MG/ML
0.3 INJECTION, SOLUTION, CONCENTRATE INTRAVENOUS PRN
Status: DISCONTINUED | OUTPATIENT
Start: 2023-08-31 | End: 2023-09-01 | Stop reason: HOSPADM

## 2023-08-31 RX ORDER — DIPHENHYDRAMINE HYDROCHLORIDE 50 MG/ML
50 INJECTION INTRAMUSCULAR; INTRAVENOUS
Status: DISCONTINUED | OUTPATIENT
Start: 2023-08-31 | End: 2023-09-01 | Stop reason: HOSPADM

## 2023-08-31 RX ORDER — ONDANSETRON 2 MG/ML
8 INJECTION INTRAMUSCULAR; INTRAVENOUS
Status: DISCONTINUED | OUTPATIENT
Start: 2023-08-31 | End: 2023-09-01 | Stop reason: HOSPADM

## 2023-08-31 RX ORDER — ALBUTEROL SULFATE 90 UG/1
4 AEROSOL, METERED RESPIRATORY (INHALATION) PRN
Status: DISCONTINUED | OUTPATIENT
Start: 2023-08-31 | End: 2023-09-01 | Stop reason: HOSPADM

## 2023-08-31 RX ORDER — MEPERIDINE HYDROCHLORIDE 25 MG/ML
12.5 INJECTION INTRAMUSCULAR; INTRAVENOUS; SUBCUTANEOUS PRN
Status: DISCONTINUED | OUTPATIENT
Start: 2023-08-31 | End: 2023-09-01 | Stop reason: HOSPADM

## 2023-08-31 RX ADMIN — SODIUM CHLORIDE, PRESERVATIVE FREE 10 ML: 5 INJECTION INTRAVENOUS at 15:00

## 2023-08-31 RX ADMIN — SODIUM CHLORIDE 125 ML/HR: 9 INJECTION, SOLUTION INTRAVENOUS at 15:02

## 2023-08-31 RX ADMIN — SODIUM CHLORIDE 200 MG: 9 INJECTION, SOLUTION INTRAVENOUS at 15:02

## 2023-08-31 NOTE — PROGRESS NOTES
Patient arrived to [address] [facility] for [facility]. Assessment completed. No needs voiced at this time. Patient tolerated infusion well and is aware of next appointment on 9/21/2023 @1500. Patient discharged ambulatory.

## 2023-09-11 ENCOUNTER — OFFICE VISIT (OUTPATIENT)
Dept: ORTHOPEDIC SURGERY | Age: 78
End: 2023-09-11

## 2023-09-11 VITALS — BODY MASS INDEX: 27.11 KG/M2 | HEIGHT: 69 IN | WEIGHT: 183 LBS

## 2023-09-11 DIAGNOSIS — M48.061 SPINAL STENOSIS OF LUMBAR REGION, UNSPECIFIED WHETHER NEUROGENIC CLAUDICATION PRESENT: ICD-10-CM

## 2023-09-11 DIAGNOSIS — M54.50 LOW BACK PAIN, UNSPECIFIED BACK PAIN LATERALITY, UNSPECIFIED CHRONICITY, UNSPECIFIED WHETHER SCIATICA PRESENT: Primary | ICD-10-CM

## 2023-09-11 RX ORDER — ALPRAZOLAM 0.5 MG/1
TABLET ORAL
Qty: 2 TABLET | Refills: 0 | Status: SHIPPED | OUTPATIENT
Start: 2023-09-11 | End: 2023-10-27

## 2023-09-11 NOTE — PROGRESS NOTES
Name: Nate Davison  YOB: 1945  Gender: male  MRN: [id number]    DATE: 9/11/2023    CC: Follow-up (MRI results )       HPI:  This is a recheck on patient Nate Davison he is a 69-year-old white male that we are seeing for back and leg pain that is exacerbated by standing and walking and is improved by sitting or bending over he has a positive grocery cart sign. He has had failed conservative care so we sent him for a lumbar MRI scan and he is following up with the results today. RADIOGRAPHS: Bar MRI scan done 9/6/2023 shows rather severe spinal stenosis at L3-4 and just minor stenosis at L4-5. Looks like he may have a grade 1 spondylolisthesis at L4-5 as well     PE: Exam is unchanged    CURRENT MEDS:     Current Outpatient Medications:     ALPRAZolam (XANAX) 0.5 MG tablet, TAKE 45 MINUTES PRIOR TO MRI, MAY REPEAT IF NEEDED, Disp: 2 tablet, Rfl: 0    levothyroxine (SYNTHROID) 125 MCG tablet, Take 1 tablet by mouth daily, Disp: 30 tablet, Rfl: 2    nystatin (MYCOSTATIN) 761221 UNIT/GM powder, Apply 3 times daily to affected areas. , Disp: 60 g, Rfl: 1    tamsulosin (FLOMAX) 0.4 MG capsule, 1 capsule daily, Disp: , Rfl:     finasteride (PROSCAR) 5 MG tablet, , Disp: , Rfl:     losartan-hydroCHLOROthiazide (HYZAAR) 100-25 MG per tablet, Take 1 tablet by mouth daily, Disp: , Rfl:     apixaban (ELIQUIS) 5 MG TABS tablet, Take 1 tablet by mouth 2 times daily Hold 48 hours prior to surgery per medication hold clearance / [facility], Disp: , Rfl:     atorvastatin (LIPITOR) 20 MG tablet, Take 1 tablet by mouth every morning, Disp: , Rfl:     cetirizine (ZYRTEC) 10 MG tablet, Take 1 tablet by mouth daily, Disp: , Rfl:     metoprolol tartrate (LOPRESSOR) 25 MG tablet, Take 1 tablet by mouth 2 times daily, Disp: , Rfl:     Multiple Vitamin (MULTIVITAMINS PO), Take 1 tablet by mouth daily, Disp: , Rfl:    Allergies   Allergen Reactions    Lisinopril Cough       ASSESSMENT/PLAN:

## 2023-09-19 DIAGNOSIS — M48.061 SPINAL STENOSIS OF LUMBAR REGION, UNSPECIFIED WHETHER NEUROGENIC CLAUDICATION PRESENT: Primary | ICD-10-CM

## 2023-09-19 RX ORDER — TRAMADOL HYDROCHLORIDE 50 MG/1
50 TABLET ORAL EVERY 6 HOURS PRN
Qty: 28 TABLET | Refills: 0 | Status: SHIPPED | OUTPATIENT
Start: 2023-09-19 | End: 2023-09-26

## 2023-09-19 NOTE — TELEPHONE ENCOUNTER
He is scheduled for an injection next Tuesday but is in a lot of pain and wondering if he can get something sent just to get him by until then. He doesn't want more than 7 days of something because it will be too expensive for him.

## 2023-09-21 ENCOUNTER — HOSPITAL ENCOUNTER (OUTPATIENT)
Dept: INFUSION THERAPY | Age: 78
Discharge: HOME | End: 2023-09-21
Payer: MEDICARE

## 2023-09-21 ENCOUNTER — OFFICE VISIT (OUTPATIENT)
Dept: ONCOLOGY | Age: 78
End: 2023-09-21
Payer: MEDICARE

## 2023-09-21 ENCOUNTER — HOSPITAL ENCOUNTER (OUTPATIENT)
Dept: LAB | Age: 78
End: 2023-09-21
Payer: MEDICARE

## 2023-09-21 VITALS
DIASTOLIC BLOOD PRESSURE: 93 MMHG | HEART RATE: 60 BPM | HEIGHT: 69 IN | SYSTOLIC BLOOD PRESSURE: 159 MMHG | BODY MASS INDEX: 27.7 KG/M2 | OXYGEN SATURATION: 98 % | WEIGHT: 187 LBS | TEMPERATURE: 97.9 F | RESPIRATION RATE: 17 BRPM

## 2023-09-21 DIAGNOSIS — C43.71 MELANOMA OF RIGHT POSTERIOR CALF (HCC): ICD-10-CM

## 2023-09-21 DIAGNOSIS — C43.71 MELANOMA OF RIGHT POSTERIOR CALF (HCC): Primary | ICD-10-CM

## 2023-09-21 DIAGNOSIS — Z79.899 HIGH RISK MEDICATION USE: ICD-10-CM

## 2023-09-21 DIAGNOSIS — R60.0 LOWER EXTREMITY EDEMA: ICD-10-CM

## 2023-09-21 DIAGNOSIS — M79.604 PAIN IN BOTH LOWER EXTREMITIES: ICD-10-CM

## 2023-09-21 DIAGNOSIS — M79.605 PAIN IN BOTH LOWER EXTREMITIES: ICD-10-CM

## 2023-09-21 DIAGNOSIS — E03.2 HYPOTHYROIDISM DUE TO MEDICATION: ICD-10-CM

## 2023-09-21 LAB
ALBUMIN SERPL-MCNC: 3.9 G/DL (ref 3.2–4.6)
ALBUMIN/GLOB SERPL: 1.3 (ref 0.4–1.6)
ALP SERPL-CCNC: 66 U/L (ref 50–136)
ALT SERPL-CCNC: 29 U/L (ref 12–65)
ANION GAP SERPL CALC-SCNC: 5 MMOL/L (ref 2–11)
AST SERPL-CCNC: 20 U/L (ref 15–37)
BASOPHILS # BLD: 0 K/UL (ref 0–0.2)
BASOPHILS NFR BLD: 1 % (ref 0–2)
BILIRUB SERPL-MCNC: 0.5 MG/DL (ref 0.2–1.1)
BUN SERPL-MCNC: 17 MG/DL (ref 8–23)
CALCIUM SERPL-MCNC: 9.1 MG/DL (ref 8.3–10.4)
CHLORIDE SERPL-SCNC: 106 MMOL/L (ref 101–110)
CO2 SERPL-SCNC: 29 MMOL/L (ref 21–32)
CREAT SERPL-MCNC: 0.8 MG/DL (ref 0.8–1.5)
DIFFERENTIAL METHOD BLD: ABNORMAL
EOSINOPHIL # BLD: 0.1 K/UL (ref 0–0.8)
EOSINOPHIL NFR BLD: 2 % (ref 0.5–7.8)
ERYTHROCYTE [DISTWIDTH] IN BLOOD BY AUTOMATED COUNT: 12.7 % (ref 11.9–14.6)
GLOBULIN SER CALC-MCNC: 3.1 G/DL (ref 2.8–4.5)
GLUCOSE SERPL-MCNC: 100 MG/DL (ref 65–100)
HCT VFR BLD AUTO: 37.3 % (ref 41.1–50.3)
HGB BLD-MCNC: 12.5 G/DL (ref 13.6–17.2)
IMM GRANULOCYTES # BLD AUTO: 0 K/UL (ref 0–0.5)
IMM GRANULOCYTES NFR BLD AUTO: 0 % (ref 0–5)
LYMPHOCYTES # BLD: 0.8 K/UL (ref 0.5–4.6)
LYMPHOCYTES NFR BLD: 20 % (ref 13–44)
MCH RBC QN AUTO: 34.7 PG (ref 26.1–32.9)
MCHC RBC AUTO-ENTMCNC: 33.5 G/DL (ref 31.4–35)
MCV RBC AUTO: 103.6 FL (ref 82–102)
MONOCYTES # BLD: 0.5 K/UL (ref 0.1–1.3)
MONOCYTES NFR BLD: 13 % (ref 4–12)
NEUTS SEG # BLD: 2.7 K/UL (ref 1.7–8.2)
NEUTS SEG NFR BLD: 64 % (ref 43–78)
NRBC # BLD: 0 K/UL (ref 0–0.2)
PLATELET # BLD AUTO: 211 K/UL (ref 150–450)
PMV BLD AUTO: 9.3 FL (ref 9.4–12.3)
POTASSIUM SERPL-SCNC: 4.1 MMOL/L (ref 3.5–5.1)
PROT SERPL-MCNC: 7 G/DL (ref 6.3–8.2)
RBC # BLD AUTO: 3.6 M/UL (ref 4.23–5.6)
SODIUM SERPL-SCNC: 140 MMOL/L (ref 133–143)
TSH, 3RD GENERATION: 5.33 UIU/ML (ref 0.36–3.74)
WBC # BLD AUTO: 4.2 K/UL (ref 4.3–11.1)

## 2023-09-21 PROCEDURE — 2580000003 HC RX 258: Performed by: INTERNAL MEDICINE

## 2023-09-21 PROCEDURE — 1123F ACP DISCUSS/DSCN MKR DOCD: CPT | Performed by: INTERNAL MEDICINE

## 2023-09-21 PROCEDURE — 80053 COMPREHEN METABOLIC PANEL: CPT

## 2023-09-21 PROCEDURE — 84443 ASSAY THYROID STIM HORMONE: CPT

## 2023-09-21 PROCEDURE — 6360000002 HC RX W HCPCS: Performed by: INTERNAL MEDICINE

## 2023-09-21 PROCEDURE — 36591 DRAW BLOOD OFF VENOUS DEVICE: CPT

## 2023-09-21 PROCEDURE — 96413 CHEMO IV INFUSION 1 HR: CPT

## 2023-09-21 PROCEDURE — 85025 COMPLETE CBC W/AUTO DIFF WBC: CPT

## 2023-09-21 PROCEDURE — 99215 OFFICE O/P EST HI 40 MIN: CPT | Performed by: INTERNAL MEDICINE

## 2023-09-21 RX ORDER — FAMOTIDINE 10 MG/ML
20 INJECTION, SOLUTION INTRAVENOUS
OUTPATIENT
Start: 2023-10-12

## 2023-09-21 RX ORDER — SODIUM CHLORIDE 9 MG/ML
5-250 INJECTION, SOLUTION INTRAVENOUS PRN
OUTPATIENT
Start: 2023-10-12

## 2023-09-21 RX ORDER — EPINEPHRINE 1 MG/ML
0.3 INJECTION, SOLUTION, CONCENTRATE INTRAVENOUS PRN
Status: DISCONTINUED | OUTPATIENT
Start: 2023-09-21 | End: 2023-09-22 | Stop reason: HOSPADM

## 2023-09-21 RX ORDER — ALBUTEROL SULFATE 90 UG/1
4 AEROSOL, METERED RESPIRATORY (INHALATION) PRN
Status: DISCONTINUED | OUTPATIENT
Start: 2023-09-21 | End: 2023-09-22 | Stop reason: HOSPADM

## 2023-09-21 RX ORDER — MEPERIDINE HYDROCHLORIDE 50 MG/ML
12.5 INJECTION INTRAMUSCULAR; INTRAVENOUS; SUBCUTANEOUS PRN
OUTPATIENT
Start: 2023-10-12

## 2023-09-21 RX ORDER — EPINEPHRINE 1 MG/ML
0.3 INJECTION, SOLUTION, CONCENTRATE INTRAVENOUS PRN
OUTPATIENT
Start: 2023-10-12

## 2023-09-21 RX ORDER — MEPERIDINE HYDROCHLORIDE 50 MG/ML
12.5 INJECTION INTRAMUSCULAR; INTRAVENOUS; SUBCUTANEOUS PRN
Status: CANCELLED | OUTPATIENT
Start: 2023-09-21

## 2023-09-21 RX ORDER — HEPARIN SODIUM (PORCINE) LOCK FLUSH IV SOLN 100 UNIT/ML 100 UNIT/ML
500 SOLUTION INTRAVENOUS PRN
OUTPATIENT
Start: 2023-10-12

## 2023-09-21 RX ORDER — SODIUM CHLORIDE 0.9 % (FLUSH) 0.9 %
5-40 SYRINGE (ML) INJECTION PRN
Status: CANCELLED | OUTPATIENT
Start: 2023-09-21

## 2023-09-21 RX ORDER — LEVOTHYROXINE SODIUM 0.12 MG/1
125 TABLET ORAL DAILY
Qty: 90 TABLET | Refills: 1 | Status: SHIPPED | OUTPATIENT
Start: 2023-09-21

## 2023-09-21 RX ORDER — ALBUTEROL SULFATE 90 UG/1
4 AEROSOL, METERED RESPIRATORY (INHALATION) PRN
OUTPATIENT
Start: 2023-10-12

## 2023-09-21 RX ORDER — SODIUM CHLORIDE 9 MG/ML
5-250 INJECTION, SOLUTION INTRAVENOUS PRN
Status: CANCELLED | OUTPATIENT
Start: 2023-09-21

## 2023-09-21 RX ORDER — ONDANSETRON 2 MG/ML
8 INJECTION INTRAMUSCULAR; INTRAVENOUS
Status: DISCONTINUED | OUTPATIENT
Start: 2023-09-21 | End: 2023-09-22 | Stop reason: HOSPADM

## 2023-09-21 RX ORDER — HEPARIN SODIUM (PORCINE) LOCK FLUSH IV SOLN 100 UNIT/ML 100 UNIT/ML
500 SOLUTION INTRAVENOUS PRN
Status: CANCELLED | OUTPATIENT
Start: 2023-09-21

## 2023-09-21 RX ORDER — DIPHENHYDRAMINE HYDROCHLORIDE 50 MG/ML
50 INJECTION INTRAMUSCULAR; INTRAVENOUS
Status: CANCELLED | OUTPATIENT
Start: 2023-09-21

## 2023-09-21 RX ORDER — DIPHENHYDRAMINE HYDROCHLORIDE 50 MG/ML
50 INJECTION INTRAMUSCULAR; INTRAVENOUS
OUTPATIENT
Start: 2023-10-12

## 2023-09-21 RX ORDER — EPINEPHRINE 1 MG/ML
0.3 INJECTION, SOLUTION, CONCENTRATE INTRAVENOUS PRN
Status: CANCELLED | OUTPATIENT
Start: 2023-09-21

## 2023-09-21 RX ORDER — SODIUM CHLORIDE 9 MG/ML
INJECTION, SOLUTION INTRAVENOUS CONTINUOUS
Status: DISCONTINUED | OUTPATIENT
Start: 2023-09-21 | End: 2023-09-22 | Stop reason: HOSPADM

## 2023-09-21 RX ORDER — MEPERIDINE HYDROCHLORIDE 25 MG/ML
12.5 INJECTION INTRAMUSCULAR; INTRAVENOUS; SUBCUTANEOUS PRN
Status: DISCONTINUED | OUTPATIENT
Start: 2023-09-21 | End: 2023-09-22 | Stop reason: HOSPADM

## 2023-09-21 RX ORDER — ONDANSETRON 2 MG/ML
8 INJECTION INTRAMUSCULAR; INTRAVENOUS
Status: CANCELLED | OUTPATIENT
Start: 2023-09-21

## 2023-09-21 RX ORDER — SODIUM CHLORIDE 9 MG/ML
5-250 INJECTION, SOLUTION INTRAVENOUS PRN
Status: DISCONTINUED | OUTPATIENT
Start: 2023-09-21 | End: 2023-09-22 | Stop reason: HOSPADM

## 2023-09-21 RX ORDER — FAMOTIDINE 10 MG/ML
20 INJECTION, SOLUTION INTRAVENOUS
Status: CANCELLED | OUTPATIENT
Start: 2023-09-21

## 2023-09-21 RX ORDER — ONDANSETRON 2 MG/ML
8 INJECTION INTRAMUSCULAR; INTRAVENOUS
OUTPATIENT
Start: 2023-10-12

## 2023-09-21 RX ORDER — SODIUM CHLORIDE 0.9 % (FLUSH) 0.9 %
5-40 SYRINGE (ML) INJECTION PRN
OUTPATIENT
Start: 2023-10-12

## 2023-09-21 RX ORDER — ACETAMINOPHEN 325 MG/1
650 TABLET ORAL
Status: DISCONTINUED | OUTPATIENT
Start: 2023-09-21 | End: 2023-09-22 | Stop reason: HOSPADM

## 2023-09-21 RX ORDER — DIPHENHYDRAMINE HYDROCHLORIDE 50 MG/ML
50 INJECTION INTRAMUSCULAR; INTRAVENOUS
Status: DISCONTINUED | OUTPATIENT
Start: 2023-09-21 | End: 2023-09-22 | Stop reason: HOSPADM

## 2023-09-21 RX ORDER — SODIUM CHLORIDE 0.9 % (FLUSH) 0.9 %
5-40 SYRINGE (ML) INJECTION PRN
Status: DISCONTINUED | OUTPATIENT
Start: 2023-09-21 | End: 2023-09-25 | Stop reason: HOSPADM

## 2023-09-21 RX ORDER — ACETAMINOPHEN 325 MG/1
650 TABLET ORAL
Status: CANCELLED | OUTPATIENT
Start: 2023-09-21

## 2023-09-21 RX ORDER — ALBUTEROL SULFATE 90 UG/1
4 AEROSOL, METERED RESPIRATORY (INHALATION) PRN
Status: CANCELLED | OUTPATIENT
Start: 2023-09-21

## 2023-09-21 RX ORDER — ACETAMINOPHEN 325 MG/1
650 TABLET ORAL
OUTPATIENT
Start: 2023-10-12

## 2023-09-21 RX ORDER — SODIUM CHLORIDE 9 MG/ML
INJECTION, SOLUTION INTRAVENOUS CONTINUOUS
Status: CANCELLED | OUTPATIENT
Start: 2023-09-21

## 2023-09-21 RX ORDER — SODIUM CHLORIDE 9 MG/ML
INJECTION, SOLUTION INTRAVENOUS CONTINUOUS
OUTPATIENT
Start: 2023-10-12

## 2023-09-21 RX ORDER — SODIUM CHLORIDE 0.9 % (FLUSH) 0.9 %
5-40 SYRINGE (ML) INJECTION PRN
Status: DISCONTINUED | OUTPATIENT
Start: 2023-09-21 | End: 2023-09-22 | Stop reason: HOSPADM

## 2023-09-21 RX ADMIN — SODIUM CHLORIDE, PRESERVATIVE FREE 10 ML: 5 INJECTION INTRAVENOUS at 16:00

## 2023-09-21 RX ADMIN — SODIUM CHLORIDE, PRESERVATIVE FREE 10 ML: 5 INJECTION INTRAVENOUS at 14:18

## 2023-09-21 RX ADMIN — SODIUM CHLORIDE, PRESERVATIVE FREE 10 ML: 5 INJECTION INTRAVENOUS at 17:21

## 2023-09-21 RX ADMIN — SODIUM CHLORIDE 100 ML/HR: 9 INJECTION, SOLUTION INTRAVENOUS at 16:00

## 2023-09-21 RX ADMIN — SODIUM CHLORIDE 200 MG: 9 INJECTION, SOLUTION INTRAVENOUS at 16:27

## 2023-09-21 ASSESSMENT — PAIN DESCRIPTION - ONSET: ONSET: ON-GOING

## 2023-09-21 ASSESSMENT — PAIN DESCRIPTION - LOCATION: LOCATION: LEG

## 2023-09-21 ASSESSMENT — PAIN DESCRIPTION - PAIN TYPE: TYPE: DEEP SOMATIC PAIN

## 2023-09-21 ASSESSMENT — PAIN DESCRIPTION - DESCRIPTORS: DESCRIPTORS: DISCOMFORT;GNAWING

## 2023-09-21 ASSESSMENT — PAIN - FUNCTIONAL ASSESSMENT: PAIN_FUNCTIONAL_ASSESSMENT: PREVENTS OR INTERFERES SOME ACTIVE ACTIVITIES AND ADLS

## 2023-09-21 ASSESSMENT — PAIN DESCRIPTION - FREQUENCY: FREQUENCY: INTERMITTENT

## 2023-09-21 ASSESSMENT — PAIN DESCRIPTION - ORIENTATION: ORIENTATION: LEFT

## 2023-09-21 ASSESSMENT — PAIN SCALES - GENERAL: PAINLEVEL_OUTOF10: 7

## 2023-09-21 NOTE — PROGRESS NOTES
Arrived to the [address] [place] [address]. Keytruda completed. Patient tolerated without difficulty. Any issues or concerns during appointment: None. Patient aware of next infusion appointment on 10/12/2023 (date) at  (time). Patient aware of next lab and [facility] office visit on 11/02/2023 (date) at [phone] with lab followed by OV at [phone] (time). Patient instructed to call provider with temperature of 100.4 or greater or nausea/vomiting/ diarrhea or pain not controlled by medications  Discharged via w/c to home with wife at side.

## 2023-09-21 NOTE — PATIENT INSTRUCTIONS
09/21/2023 5  2 - 11 mmol/L Final    Glucose 09/21/2023 100  65 - 100 mg/dL Final    BUN 09/21/2023 17  8 - 23 MG/DL Final    Creatinine 09/21/2023 0.80  0.8 - 1.5 MG/DL Final    Est, Glom Filt Rate 09/21/2023 >60  >60 ml/min/1.73m2 Final    Comment:    Pediatric calculator link: [employer].at. org/professionals/kdoqi/gfr_calculatorped     These results are not intended for use in patients <25years of age. eGFR results are calculated without a race factor using  the 2021 CKD-EPI equation. Careful clinical correlation is recommended, particularly when comparing to results calculated using previous equations. The CKD-EPI equation is less accurate in patients with extremes of muscle mass, extra-renal metabolism of creatinine, excessive creatine ingestion, or following therapy that affects renal tubular secretion. Calcium 09/21/2023 9.1  8.3 - 10.4 MG/DL Final    Total Bilirubin 09/21/2023 0.5  0.2 - 1.1 MG/DL Final    ALT 09/21/2023 29  12 - 65 U/L Final    AST 09/21/2023 20  15 - 37 U/L Final    Alk Phosphatase 09/21/2023 66  50 - 136 U/L Final    Total Protein 09/21/2023 7.0  6.3 - 8.2 g/dL Final    Albumin 09/21/2023 3.9  3.2 - 4.6 g/dL Final    Globulin 09/21/2023 3.1  2.8 - 4.5 g/dL Final    Albumin/Globulin Ratio 09/21/2023 1.3  0.4 - 1.6   Final    TSH, 3RD GENERATION 09/21/2023 5.330 (H)  0.358 - 3.740 uIU/mL Final         Treatment Summary has been discussed and given to patient: n/a        -------------------------------------------------------------------------------------------------------------------  Please call our office at [phone] if you have any  of the following symptoms:   Fever of 100.5 or greater  Chills  Shortness of breath  Swelling or pain in one leg    After office hours an answering service is available and will contact a provider for emergencies or if you are experiencing any of the above symptoms. Patient does express an interest in My Chart.   My Chart log in

## 2023-09-21 NOTE — PROGRESS NOTES
8.2 K/UL    Lymphocytes Absolute 0.8 0.5 - 4.6 K/UL    Monocytes Absolute 0.5 0.1 - 1.3 K/UL    Eosinophils Absolute 0.1 0.0 - 0.8 K/UL    Basophils Absolute 0.0 0.0 - 0.2 K/UL    Absolute Immature Granulocyte 0.0 0.0 - 0.5 K/UL    Differential Type AUTOMATED     Comprehensive metabolic panel    Collection Time: 09/21/23  2:18 PM   Result Value Ref Range    Sodium 140 133 - 143 mmol/L    Potassium 4.1 3.5 - 5.1 mmol/L    Chloride 106 101 - 110 mmol/L    CO2 29 21 - 32 mmol/L    Anion Gap 5 2 - 11 mmol/L    Glucose 100 65 - 100 mg/dL    BUN 17 8 - 23 MG/DL    Creatinine 0.80 0.8 - 1.5 MG/DL    Est, Glom Filt Rate >60 >60 ml/min/1.73m2    Calcium 9.1 8.3 - 10.4 MG/DL    Total Bilirubin 0.5 0.2 - 1.1 MG/DL    ALT 29 12 - 65 U/L    AST 20 15 - 37 U/L    Alk Phosphatase 66 50 - 136 U/L    Total Protein 7.0 6.3 - 8.2 g/dL    Albumin 3.9 3.2 - 4.6 g/dL    Globulin 3.1 2.8 - 4.5 g/dL    Albumin/Globulin Ratio 1.3 0.4 - 1.6     TSH without Reflex    Collection Time: 09/21/23  2:18 PM   Result Value Ref Range    TSH, 3RD GENERATION 5.330 (H) 0.358 - 3.740 uIU/mL       Imaging:  No results found for this or any previous visit. ASSESSMENT/PLAN:   Diagnosis Orders   1. Melanoma of right posterior calf (HCC)  CBC with Auto Differential    Comprehensive Metabolic Panel    TSH      2. Lower extremity edema        3. High risk medication use  TSH      4. Pain in both lower extremities        5. Hypothyroidism due to medication              77 y.o. M consulted for right leg melanoma presented to [facility] on 1/25/2023.   He was found of the right calf melanoma and Dr. 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

## 2023-09-26 ENCOUNTER — OFFICE VISIT (OUTPATIENT)
Dept: ORTHOPEDIC SURGERY | Age: 78
End: 2023-09-26
Payer: MEDICARE

## 2023-09-26 DIAGNOSIS — M79.605 LEFT LEG PAIN: Primary | ICD-10-CM

## 2023-09-26 DIAGNOSIS — M48.062 SPINAL STENOSIS OF LUMBAR REGION WITH NEUROGENIC CLAUDICATION: ICD-10-CM

## 2023-09-26 PROCEDURE — 62323 NJX INTERLAMINAR LMBR/SAC: CPT | Performed by: PHYSICAL MEDICINE & REHABILITATION

## 2023-09-26 RX ORDER — BETAMETHASONE SODIUM PHOSPHATE AND BETAMETHASONE ACETATE 3; 3 MG/ML; MG/ML
12 INJECTION, SUSPENSION INTRA-ARTICULAR; INTRALESIONAL; INTRAMUSCULAR; SOFT TISSUE ONCE
Status: COMPLETED | OUTPATIENT
Start: 2023-09-26 | End: 2023-09-26

## 2023-09-26 RX ADMIN — BETAMETHASONE SODIUM PHOSPHATE AND BETAMETHASONE ACETATE 12 MG: 3; 3 INJECTION, SUSPENSION INTRA-ARTICULAR; INTRALESIONAL; INTRAMUSCULAR; SOFT TISSUE at 13:58

## 2023-09-26 NOTE — PROGRESS NOTES
Name: Dmitriy Cruz  YOB: 1945  Gender: male  MRN: [id number]        Interlaminar DELLA Procedure Note      Procedure: L3-L4 interlaminar epidural steroid injection    Precautions: Dmitriy Cruz denies prior sensitivity to steroid, local anesthetic, iodine, or shellfish. Consent:  Consent was obtained prior to the procedure. The procedure was discussed at length with Dmitriy Cruz. He was given the opportunity to ask questions regarding the procedure and its associated risks. In addition to the potential risks associated with the procedure itself, the patient was informed both verbally and in writing of potential side effects of the use glucocorticoids. The patient appeared to comprehend the informed consent and desired to have the procedure performed, and informed consent was signed. He was placed in a prone position on the fluoroscopy table and the skin was prepped and draped in a routine sterile fashion. The areas to be injected were each anesthetized with 1 ml of 1% Lidocaine. A 22 gauge 3.5 inch spinal needle was carefully advanced under fluoroscopic guidance to the L3-L4 interlaminar space (left paramedian). 0.5 ml of 70% of Omnipaque was injected to confirm proper needle placement and absence of subdural or vascular flow Once proper placement was confirmed, 2ml of sterile water and 12 mg of betamethasone were injected through the spinal needle. Fluoroscopic guidance was used intermittently over a 10-minute period to insure proper needle placement and his safety. A hard copy of the fluoroscopic image has been placed in his chart and is saved on the C-arm hard drive. He was monitored for 30 minutes after the procedure and discharged home in a stable fashion with a routine follow up. Procedural Diagnosis:     ICD-10-CM    1.  Left leg pain  M79.605 XR INJ SPINE THER SUBST LUM/SAC W IMG     betamethasone acetate-betamethasone sodium phosphate (CELESTONE)

## 2023-10-04 DIAGNOSIS — M48.062 SPINAL STENOSIS OF LUMBAR REGION WITH NEUROGENIC CLAUDICATION: Primary | ICD-10-CM

## 2023-10-04 RX ORDER — HYDROCODONE BITARTRATE AND ACETAMINOPHEN 5; 325 MG/1; MG/1
1 TABLET ORAL EVERY 8 HOURS PRN
Qty: 21 TABLET | Refills: 0 | Status: SHIPPED | OUTPATIENT
Start: 2023-10-04 | End: 2023-10-11

## 2023-10-04 NOTE — TELEPHONE ENCOUNTER
Spoke with wife. Per CATARINO, he will send over some norco for the pain and they are to call us back in a few days if the pain is no better.

## 2023-10-04 NOTE — TELEPHONE ENCOUNTER
He had an injection a week ago Tuesday and he is in excruciating pain. His wife is wondering what they can try.

## 2023-10-09 ENCOUNTER — TELEPHONE (OUTPATIENT)
Dept: ORTHOPEDIC SURGERY | Age: 78
End: 2023-10-09

## 2023-10-09 DIAGNOSIS — M48.062 SPINAL STENOSIS OF LUMBAR REGION WITH NEUROGENIC CLAUDICATION: Primary | ICD-10-CM

## 2023-10-09 NOTE — TELEPHONE ENCOUNTER
His wife is calling again because his pain is not any better. She said you mentioned an MRI the other day but he just had one. Please call to discuss.

## 2023-10-11 ENCOUNTER — TELEPHONE (OUTPATIENT)
Dept: ORTHOPEDIC SURGERY | Age: 78
End: 2023-10-11

## 2023-10-11 ENCOUNTER — HOSPITAL ENCOUNTER (OUTPATIENT)
Age: 78
Discharge: HOME | End: 2023-10-13
Payer: MEDICARE

## 2023-10-11 DIAGNOSIS — M48.062 SPINAL STENOSIS OF LUMBAR REGION WITH NEUROGENIC CLAUDICATION: ICD-10-CM

## 2023-10-11 PROCEDURE — 72148 MRI LUMBAR SPINE W/O DYE: CPT

## 2023-10-11 NOTE — TELEPHONE ENCOUNTER
He had his MRI this morning and she wants CATARINO to look at it asap so maybe he can get some relief for his pain. Please call as soon as you know something.

## 2023-10-11 NOTE — TELEPHONE ENCOUNTER
Returned call and notified patient that the scan was negative for a hematoma. CATARINO will refer him to one of our surgeons. Appointment made.

## 2023-10-12 ENCOUNTER — HOSPITAL ENCOUNTER (OUTPATIENT)
Dept: INFUSION THERAPY | Age: 78
Discharge: HOME | End: 2023-10-12
Payer: MEDICARE

## 2023-10-12 VITALS
RESPIRATION RATE: 16 BRPM | TEMPERATURE: 97.3 F | BODY MASS INDEX: 26.7 KG/M2 | WEIGHT: 180.8 LBS | OXYGEN SATURATION: 97 % | DIASTOLIC BLOOD PRESSURE: 83 MMHG | HEART RATE: 83 BPM | SYSTOLIC BLOOD PRESSURE: 163 MMHG

## 2023-10-12 DIAGNOSIS — C43.71 MELANOMA OF RIGHT POSTERIOR CALF (HCC): Primary | ICD-10-CM

## 2023-10-12 LAB
ALBUMIN SERPL-MCNC: 3.7 G/DL (ref 3.2–4.6)
ALBUMIN/GLOB SERPL: 1.2 (ref 0.4–1.6)
ALP SERPL-CCNC: 94 U/L (ref 50–136)
ALT SERPL-CCNC: 37 U/L (ref 12–65)
ANION GAP SERPL CALC-SCNC: 5 MMOL/L (ref 2–11)
AST SERPL-CCNC: 22 U/L (ref 15–37)
BASOPHILS # BLD: 0.1 K/UL (ref 0–0.2)
BASOPHILS NFR BLD: 1 % (ref 0–2)
BILIRUB SERPL-MCNC: 0.4 MG/DL (ref 0.2–1.1)
BUN SERPL-MCNC: 16 MG/DL (ref 8–23)
CALCIUM SERPL-MCNC: 9.3 MG/DL (ref 8.3–10.4)
CHLORIDE SERPL-SCNC: 104 MMOL/L (ref 101–110)
CO2 SERPL-SCNC: 30 MMOL/L (ref 21–32)
CREAT SERPL-MCNC: 0.8 MG/DL (ref 0.8–1.5)
DIFFERENTIAL METHOD BLD: ABNORMAL
EOSINOPHIL # BLD: 0.1 K/UL (ref 0–0.8)
EOSINOPHIL NFR BLD: 2 % (ref 0.5–7.8)
ERYTHROCYTE [DISTWIDTH] IN BLOOD BY AUTOMATED COUNT: 12.1 % (ref 11.9–14.6)
GLOBULIN SER CALC-MCNC: 3.2 G/DL (ref 2.8–4.5)
GLUCOSE SERPL-MCNC: 119 MG/DL (ref 65–100)
HCT VFR BLD AUTO: 39.4 % (ref 41.1–50.3)
HGB BLD-MCNC: 13.6 G/DL (ref 13.6–17.2)
IMM GRANULOCYTES # BLD AUTO: 0 K/UL (ref 0–0.5)
IMM GRANULOCYTES NFR BLD AUTO: 0 % (ref 0–5)
LYMPHOCYTES # BLD: 1 K/UL (ref 0.5–4.6)
LYMPHOCYTES NFR BLD: 17 % (ref 13–44)
MCH RBC QN AUTO: 35 PG (ref 26.1–32.9)
MCHC RBC AUTO-ENTMCNC: 34.5 G/DL (ref 31.4–35)
MCV RBC AUTO: 101.3 FL (ref 82–102)
MONOCYTES # BLD: 0.5 K/UL (ref 0.1–1.3)
MONOCYTES NFR BLD: 10 % (ref 4–12)
NEUTS SEG # BLD: 3.9 K/UL (ref 1.7–8.2)
NEUTS SEG NFR BLD: 70 % (ref 43–78)
NRBC # BLD: 0 K/UL (ref 0–0.2)
PLATELET # BLD AUTO: 216 K/UL (ref 150–450)
PMV BLD AUTO: 9.5 FL (ref 9.4–12.3)
POTASSIUM SERPL-SCNC: 4 MMOL/L (ref 3.5–5.1)
PROT SERPL-MCNC: 6.9 G/DL (ref 6.3–8.2)
RBC # BLD AUTO: 3.89 M/UL (ref 4.23–5.6)
SODIUM SERPL-SCNC: 139 MMOL/L (ref 133–143)
TSH, 3RD GENERATION: 4.62 UIU/ML (ref 0.36–3)
WBC # BLD AUTO: 5.6 K/UL (ref 4.3–11.1)

## 2023-10-12 PROCEDURE — 96413 CHEMO IV INFUSION 1 HR: CPT

## 2023-10-12 PROCEDURE — 6360000002 HC RX W HCPCS: Performed by: INTERNAL MEDICINE

## 2023-10-12 PROCEDURE — 84443 ASSAY THYROID STIM HORMONE: CPT

## 2023-10-12 PROCEDURE — 2580000003 HC RX 258: Performed by: INTERNAL MEDICINE

## 2023-10-12 PROCEDURE — 80053 COMPREHEN METABOLIC PANEL: CPT

## 2023-10-12 PROCEDURE — 85025 COMPLETE CBC W/AUTO DIFF WBC: CPT

## 2023-10-12 RX ORDER — SODIUM CHLORIDE 0.9 % (FLUSH) 0.9 %
5-40 SYRINGE (ML) INJECTION PRN
Status: DISCONTINUED | OUTPATIENT
Start: 2023-10-12 | End: 2023-10-13 | Stop reason: HOSPADM

## 2023-10-12 RX ORDER — SODIUM CHLORIDE 9 MG/ML
5-250 INJECTION, SOLUTION INTRAVENOUS PRN
Status: DISCONTINUED | OUTPATIENT
Start: 2023-10-12 | End: 2023-10-13 | Stop reason: HOSPADM

## 2023-10-12 RX ORDER — DIPHENHYDRAMINE HYDROCHLORIDE 50 MG/ML
50 INJECTION INTRAMUSCULAR; INTRAVENOUS
Status: DISCONTINUED | OUTPATIENT
Start: 2023-10-12 | End: 2023-10-13 | Stop reason: HOSPADM

## 2023-10-12 RX ADMIN — SODIUM CHLORIDE 200 MG: 9 INJECTION, SOLUTION INTRAVENOUS at 16:40

## 2023-10-12 RX ADMIN — SODIUM CHLORIDE, PRESERVATIVE FREE 10 ML: 5 INJECTION INTRAVENOUS at 17:13

## 2023-10-12 RX ADMIN — SODIUM CHLORIDE 100 ML/HR: 9 INJECTION, SOLUTION INTRAVENOUS at 16:17

## 2023-10-12 RX ADMIN — SODIUM CHLORIDE, PRESERVATIVE FREE 10 ML: 5 INJECTION INTRAVENOUS at 16:17

## 2023-10-12 NOTE — PROGRESS NOTES
Arrived to the [address] [place] [address]. Labs + Keytruda completed. Patient tolerated well. Any issues or concerns during appointment: none. Patient aware of next infusion appointment on 11/2 (date) at 2:45 PM (time). Patient instructed to call provider with temperature of 100.4 or greater or nausea/vomiting/ diarrhea or pain not controlled by medications  Discharged ambulatory.

## 2023-10-13 ENCOUNTER — OFFICE VISIT (OUTPATIENT)
Dept: ORTHOPEDIC SURGERY | Age: 78
End: 2023-10-13
Payer: MEDICARE

## 2023-10-13 VITALS — WEIGHT: 175 LBS | BODY MASS INDEX: 24.5 KG/M2 | HEIGHT: 71 IN

## 2023-10-13 DIAGNOSIS — M79.605 LEFT LEG PAIN: ICD-10-CM

## 2023-10-13 DIAGNOSIS — M48.062 SPINAL STENOSIS OF LUMBAR REGION WITH NEUROGENIC CLAUDICATION: Primary | ICD-10-CM

## 2023-10-13 PROCEDURE — 99213 OFFICE O/P EST LOW 20 MIN: CPT | Performed by: ORTHOPAEDIC SURGERY

## 2023-10-13 PROCEDURE — 1123F ACP DISCUSS/DSCN MKR DOCD: CPT | Performed by: ORTHOPAEDIC SURGERY

## 2023-10-13 RX ORDER — HYDROCODONE BITARTRATE AND ACETAMINOPHEN 5; 325 MG/1; MG/1
1 TABLET ORAL EVERY 6 HOURS PRN
Qty: 28 TABLET | Refills: 0 | Status: SHIPPED | OUTPATIENT
Start: 2023-10-13 | End: 2023-10-20

## 2023-10-13 NOTE — PROGRESS NOTES
HYDROcodone-acetaminophen (NORCO) 5-325 MG per tablet, Take 1 tablet by mouth every 6 hours as needed for Pain for up to 7 days. Intended supply: 7 days. Take lowest dose possible to manage pain Max Daily Amount: 4 tablets, Disp: 28 tablet, Rfl: 0    ALPRAZolam (XANAX) 0.5 MG tablet, TAKE 45 MINUTES PRIOR TO MRI, MAY REPEAT IF NEEDED (Patient not taking: Reported on 10/13/2023), Disp: 2 tablet, Rfl: 0    Allergies: Allergies   Allergen Reactions    Lisinopril Cough         Physical Exam:     This is a well developed well nourished male adult in no acute distress. Mood and affect are appropriate. Oriented to person, place, and time. Respirations are unlabored and there is no evidence of cyanosis        Sensory testing:     L2 L3 L4 L5 S1 S2   Right 2 2 2 2 2 2   Left 2 2 2 2 2 2     0=absent; 1=altered; 2=normal; NT= not tested  Reflexes    Reflexes   Right Left   Quadriceps (L4) 2 2   Achilles (S1) 2 2     Strength testing in the lower extremity reveals the following based on the 5 point grading scale:     HF  (L2) KE (L3/4) ADF (L4) EHL (L5) APF (S1)   Right 5 5 5 5 5   Left 5 5 5 5 5   0=total paralysis; 1=palpable or visible contraction; 2= active movement with gravity eliminated; 3= active movement against gravity; 4= active movement against moderate resistance; 5= active movement against full resistance         Radiographic Studies:     X-rays including AP and lateral views of the lumbar spine were reviewed and interpreted:     X-rays of the lumbar spine show some disc degeneration. There is also spondylolisthesis at L3 on L4 as well as L4 and L5. Radiographic Impression:    Lumbar spondylosis  Lumbar spondylolisthesis    MRI of the lumbar spine images were reviewed and interpreted: MRI of the lumbar spine shows spondylolisthesis at L3 and L4 and L4 and L5. There is disc degeneration present. There is also mild spinal stenosis at the level of L3 and L4 as well as L2 and L3.   There is

## 2023-10-23 ENCOUNTER — OFFICE VISIT (OUTPATIENT)
Dept: ORTHOPEDIC SURGERY | Age: 78
End: 2023-10-23
Payer: MEDICARE

## 2023-10-23 DIAGNOSIS — M79.605 LEFT LEG PAIN: ICD-10-CM

## 2023-10-23 DIAGNOSIS — M48.062 SPINAL STENOSIS OF LUMBAR REGION WITH NEUROGENIC CLAUDICATION: Primary | ICD-10-CM

## 2023-10-23 DIAGNOSIS — M48.061 SPINAL STENOSIS OF LUMBAR REGION, UNSPECIFIED WHETHER NEUROGENIC CLAUDICATION PRESENT: ICD-10-CM

## 2023-10-23 DIAGNOSIS — M54.50 LOW BACK PAIN, UNSPECIFIED BACK PAIN LATERALITY, UNSPECIFIED CHRONICITY, UNSPECIFIED WHETHER SCIATICA PRESENT: ICD-10-CM

## 2023-10-23 PROCEDURE — 64483 NJX AA&/STRD TFRM EPI L/S 1: CPT | Performed by: PHYSICAL MEDICINE & REHABILITATION

## 2023-10-23 RX ORDER — TRIAMCINOLONE ACETONIDE 40 MG/ML
40 INJECTION, SUSPENSION INTRA-ARTICULAR; INTRAMUSCULAR ONCE
Status: COMPLETED | OUTPATIENT
Start: 2023-10-23 | End: 2023-10-23

## 2023-10-23 RX ADMIN — TRIAMCINOLONE ACETONIDE 40 MG: 40 INJECTION, SUSPENSION INTRA-ARTICULAR; INTRAMUSCULAR at 09:08

## 2023-10-23 NOTE — PROGRESS NOTES
Name: Radha Brito  YOB: 1945  Gender: male  MRN: [id number]        Transforaminal DELLA Procedure Note    Procedure: Left  L5-S1 transforaminal epidural steroid injections     Precautions: Radha Brito denies prior sensitivity to steroid, local anesthetic, iodine, or shellfish. Consent:  Consent was obtained prior to the procedure. The procedure was discussed at length with Radha Brtio. He was given the opportunity to ask questions regarding the procedure and its associated risks. In addition to the potential risks associated with the procedure itself, the patient was informed both verbally and in writing of potential side effects of the use glucocorticoids. The patient appeared to comprehend the informed consent and desired to have the procedure performed, and informed consent was signed. He was placed in a prone position on the fluoroscopy table and the skin was prepped and draped in a routine sterile fashion. The areas to be injected were each anesthetized with 1 ml of 1% Lidocaine. A 22 gauge 3.5 inch spinal needle was carefully advanced under fluoroscopic guidance to the left L5-S1 transforaminal space  0.5 ml of 70% of Omnipaque was injected to confirm proper needle placement and absence of subdural or vascular flow Once proper placement was confirmed, 0.5ml of 0.25 marcaine and 40 mg kenalog were injected through the spinal needle. Fluoroscopic guidance was used intermittently over a 10-minute period to insure proper needle placement and his safety. A hard copy of the fluoroscopic image has been placed in his chart and is saved on the C-arm hard drive. He was monitored for 30 minutes after the procedure and discharged home in a stable fashion with a routine follow up. Procedural Diagnosis:     ICD-10-CM    1.  Spinal stenosis of lumbar region with neurogenic claudication  M48.062 FL NERVE BLOCK LUMBOSACRAL 1ST     triamcinolone acetonide (KENALOG-40)

## 2023-10-27 ENCOUNTER — OFFICE VISIT (OUTPATIENT)
Dept: ORTHOPEDIC SURGERY | Age: 78
End: 2023-10-27
Payer: MEDICARE

## 2023-10-27 DIAGNOSIS — M79.605 LEFT LEG PAIN: ICD-10-CM

## 2023-10-27 DIAGNOSIS — M48.062 SPINAL STENOSIS OF LUMBAR REGION WITH NEUROGENIC CLAUDICATION: Primary | ICD-10-CM

## 2023-10-27 PROCEDURE — 99213 OFFICE O/P EST LOW 20 MIN: CPT | Performed by: ORTHOPAEDIC SURGERY

## 2023-10-27 PROCEDURE — 1123F ACP DISCUSS/DSCN MKR DOCD: CPT | Performed by: ORTHOPAEDIC SURGERY

## 2023-10-27 NOTE — PROGRESS NOTES
Name: Jf Russell  YOB: 1945  Gender: male  MRN: [id number]  Age: 77 y.o. Chief Complaint: Low back pain left leg pain. History of Present Illness: This is a very pleasant 77 y.o. male who presents with a history of low back pain and left leg pain. Pain is sharp and patient's is 10 out of 10 in severity. He has to use a walker to ambulate. He had an injection which gave him about 1 day of improvement. He has not done physical therapy. He states his pain goes down the back of the leg to the ankles as well as to the top of the foot. Medications:       Current Outpatient Medications:     levothyroxine (SYNTHROID) 125 MCG tablet, Take 1 tablet by mouth daily, Disp: 90 tablet, Rfl: 1    ALPRAZolam (XANAX) 0.5 MG tablet, TAKE 45 MINUTES PRIOR TO MRI, MAY REPEAT IF NEEDED (Patient not taking: Reported on 10/13/2023), Disp: 2 tablet, Rfl: 0    nystatin (MYCOSTATIN) 232142 UNIT/GM powder, Apply 3 times daily to affected areas. , Disp: 60 g, Rfl: 1    tamsulosin (FLOMAX) 0.4 MG capsule, 1 capsule daily, Disp: , Rfl:     finasteride (PROSCAR) 5 MG tablet, , Disp: , Rfl:     losartan-hydroCHLOROthiazide (HYZAAR) 100-25 MG per tablet, Take 1 tablet by mouth daily, Disp: , Rfl:     apixaban (ELIQUIS) 5 MG TABS tablet, Take 1 tablet by mouth 2 times daily Hold 48 hours prior to surgery per medication hold clearance / [facility], Disp: , Rfl:     atorvastatin (LIPITOR) 20 MG tablet, Take 1 tablet by mouth every morning, Disp: , Rfl:     cetirizine (ZYRTEC) 10 MG tablet, Take 1 tablet by mouth daily, Disp: , Rfl:     metoprolol tartrate (LOPRESSOR) 25 MG tablet, Take 1 tablet by mouth 2 times daily, Disp: , Rfl:     Multiple Vitamin (MULTIVITAMINS PO), Take 1 tablet by mouth daily, Disp: , Rfl:     Allergies: Allergies   Allergen Reactions    Lisinopril Cough         Physical Exam:     This is a well developed well nourished male adult in no acute distress.      Mood and

## 2023-10-30 RX ORDER — LEVOTHYROXINE SODIUM 0.05 MG/1
50 TABLET ORAL DAILY
Qty: 90 TABLET | Refills: 0 | OUTPATIENT
Start: 2023-10-30 | End: 2024-01-28

## 2023-10-31 ENCOUNTER — TELEPHONE (OUTPATIENT)
Dept: ORTHOPEDIC SURGERY | Age: 78
End: 2023-10-31

## 2023-10-31 NOTE — TELEPHONE ENCOUNTER
His wife is calling because he is still in extreme pain. He is also supposed to go to PT but hasn't heard anything.

## 2023-11-01 ENCOUNTER — TELEPHONE (OUTPATIENT)
Dept: ORTHOPEDIC SURGERY | Age: 78
End: 2023-11-01

## 2023-11-01 NOTE — TELEPHONE ENCOUNTER
Patient's wife Dayan Evans called and wanted to schedule PT for patient. The next available is in December. She states the patient's pain level is a 10 and has been for 3 months. I informed her that she could go somewhere else for PT and an order can be faxed to that office, but she insisted I send a message because she would like to speak to someone today. Thank you.

## 2023-11-01 NOTE — TELEPHONE ENCOUNTER
Called spoke to patient he is scheduled with [address] in [place] on 11/2/23 at 1:30 pm.    Patient states he has an infusion on 11/2/23 and he will need to r/s: gave phone number to patient to call to r/s at a convenient time.

## 2023-11-02 ENCOUNTER — HOSPITAL ENCOUNTER (OUTPATIENT)
Dept: INFUSION THERAPY | Age: 78
Discharge: HOME | End: 2023-11-02
Payer: MEDICARE

## 2023-11-02 ENCOUNTER — OFFICE VISIT (OUTPATIENT)
Dept: ONCOLOGY | Age: 78
End: 2023-11-02
Payer: MEDICARE

## 2023-11-02 ENCOUNTER — HOSPITAL ENCOUNTER (OUTPATIENT)
Dept: LAB | Age: 78
Discharge: HOME | End: 2023-11-02
Payer: MEDICARE

## 2023-11-02 VITALS
HEART RATE: 74 BPM | DIASTOLIC BLOOD PRESSURE: 80 MMHG | WEIGHT: 175 LBS | SYSTOLIC BLOOD PRESSURE: 137 MMHG | HEIGHT: 69 IN | RESPIRATION RATE: 16 BRPM | BODY MASS INDEX: 25.92 KG/M2 | OXYGEN SATURATION: 97 %

## 2023-11-02 DIAGNOSIS — Z79.899 HIGH RISK MEDICATION USE: ICD-10-CM

## 2023-11-02 DIAGNOSIS — C61 PROSTATE CANCER (HCC): ICD-10-CM

## 2023-11-02 DIAGNOSIS — C43.71 MELANOMA OF RIGHT POSTERIOR CALF (HCC): Primary | ICD-10-CM

## 2023-11-02 DIAGNOSIS — Z51.11 ENCOUNTER FOR ANTINEOPLASTIC CHEMOTHERAPY: ICD-10-CM

## 2023-11-02 DIAGNOSIS — C43.71 MELANOMA OF RIGHT POSTERIOR CALF (HCC): ICD-10-CM

## 2023-11-02 DIAGNOSIS — E03.2 HYPOTHYROIDISM DUE TO MEDICATION: ICD-10-CM

## 2023-11-02 LAB
ALBUMIN SERPL-MCNC: 4.2 G/DL (ref 3.2–4.6)
ALBUMIN/GLOB SERPL: 1.3 (ref 0.4–1.6)
ALP SERPL-CCNC: 92 U/L (ref 50–136)
ALT SERPL-CCNC: 43 U/L (ref 12–65)
ANION GAP SERPL CALC-SCNC: 3 MMOL/L (ref 2–11)
AST SERPL-CCNC: 24 U/L (ref 15–37)
BASOPHILS # BLD: 0.1 K/UL (ref 0–0.2)
BASOPHILS NFR BLD: 1 % (ref 0–2)
BILIRUB SERPL-MCNC: 0.5 MG/DL (ref 0.2–1.1)
BUN SERPL-MCNC: 19 MG/DL (ref 8–23)
CALCIUM SERPL-MCNC: 9.4 MG/DL (ref 8.3–10.4)
CHLORIDE SERPL-SCNC: 106 MMOL/L (ref 101–110)
CO2 SERPL-SCNC: 29 MMOL/L (ref 21–32)
CREAT SERPL-MCNC: 1 MG/DL (ref 0.8–1.5)
DIFFERENTIAL METHOD BLD: ABNORMAL
EOSINOPHIL # BLD: 0.1 K/UL (ref 0–0.8)
EOSINOPHIL NFR BLD: 2 % (ref 0.5–7.8)
ERYTHROCYTE [DISTWIDTH] IN BLOOD BY AUTOMATED COUNT: 12.3 % (ref 11.9–14.6)
GLOBULIN SER CALC-MCNC: 3.3 G/DL (ref 2.8–4.5)
GLUCOSE SERPL-MCNC: 135 MG/DL (ref 65–100)
HCT VFR BLD AUTO: 42.2 % (ref 41.1–50.3)
HGB BLD-MCNC: 14.5 G/DL (ref 13.6–17.2)
IMM GRANULOCYTES # BLD AUTO: 0 K/UL (ref 0–0.5)
IMM GRANULOCYTES NFR BLD AUTO: 0 % (ref 0–5)
LYMPHOCYTES # BLD: 0.9 K/UL (ref 0.5–4.6)
LYMPHOCYTES NFR BLD: 12 % (ref 13–44)
MCH RBC QN AUTO: 34.4 PG (ref 26.1–32.9)
MCHC RBC AUTO-ENTMCNC: 34.4 G/DL (ref 31.4–35)
MCV RBC AUTO: 100 FL (ref 82–102)
MONOCYTES # BLD: 0.6 K/UL (ref 0.1–1.3)
MONOCYTES NFR BLD: 8 % (ref 4–12)
NEUTS SEG # BLD: 5.8 K/UL (ref 1.7–8.2)
NEUTS SEG NFR BLD: 77 % (ref 43–78)
NRBC # BLD: 0 K/UL (ref 0–0.2)
PLATELET # BLD AUTO: 251 K/UL (ref 150–450)
PMV BLD AUTO: 9.1 FL (ref 9.4–12.3)
POTASSIUM SERPL-SCNC: 3.8 MMOL/L (ref 3.5–5.1)
PROT SERPL-MCNC: 7.5 G/DL (ref 6.3–8.2)
RBC # BLD AUTO: 4.22 M/UL (ref 4.23–5.6)
SODIUM SERPL-SCNC: 138 MMOL/L (ref 133–143)
TSH, 3RD GENERATION: 3.36 UIU/ML (ref 0.36–3.74)
WBC # BLD AUTO: 7.5 K/UL (ref 4.3–11.1)

## 2023-11-02 PROCEDURE — 99215 OFFICE O/P EST HI 40 MIN: CPT | Performed by: INTERNAL MEDICINE

## 2023-11-02 PROCEDURE — 96413 CHEMO IV INFUSION 1 HR: CPT

## 2023-11-02 PROCEDURE — 1123F ACP DISCUSS/DSCN MKR DOCD: CPT | Performed by: INTERNAL MEDICINE

## 2023-11-02 PROCEDURE — 2580000003 HC RX 258: Performed by: INTERNAL MEDICINE

## 2023-11-02 PROCEDURE — 84443 ASSAY THYROID STIM HORMONE: CPT

## 2023-11-02 PROCEDURE — 85025 COMPLETE CBC W/AUTO DIFF WBC: CPT

## 2023-11-02 PROCEDURE — 80053 COMPREHEN METABOLIC PANEL: CPT

## 2023-11-02 PROCEDURE — 6360000002 HC RX W HCPCS: Performed by: INTERNAL MEDICINE

## 2023-11-02 RX ORDER — SODIUM CHLORIDE 0.9 % (FLUSH) 0.9 %
5-40 SYRINGE (ML) INJECTION PRN
Status: DISCONTINUED | OUTPATIENT
Start: 2023-11-02 | End: 2023-11-03 | Stop reason: HOSPADM

## 2023-11-02 RX ORDER — ONDANSETRON 2 MG/ML
8 INJECTION INTRAMUSCULAR; INTRAVENOUS
OUTPATIENT
Start: 2023-11-23

## 2023-11-02 RX ORDER — SODIUM CHLORIDE 9 MG/ML
5-250 INJECTION, SOLUTION INTRAVENOUS PRN
OUTPATIENT
Start: 2023-11-23

## 2023-11-02 RX ORDER — ALBUTEROL SULFATE 90 UG/1
4 AEROSOL, METERED RESPIRATORY (INHALATION) PRN
OUTPATIENT
Start: 2023-11-23

## 2023-11-02 RX ORDER — ACETAMINOPHEN 325 MG/1
650 TABLET ORAL
OUTPATIENT
Start: 2023-11-23

## 2023-11-02 RX ORDER — SODIUM CHLORIDE 9 MG/ML
5-250 INJECTION, SOLUTION INTRAVENOUS PRN
Status: CANCELLED | OUTPATIENT
Start: 2023-11-02

## 2023-11-02 RX ORDER — ALBUTEROL SULFATE 90 UG/1
4 AEROSOL, METERED RESPIRATORY (INHALATION) PRN
Status: DISCONTINUED | OUTPATIENT
Start: 2023-11-02 | End: 2023-11-03 | Stop reason: HOSPADM

## 2023-11-02 RX ORDER — ONDANSETRON 2 MG/ML
8 INJECTION INTRAMUSCULAR; INTRAVENOUS
OUTPATIENT
Start: 2023-12-14

## 2023-11-02 RX ORDER — DIPHENHYDRAMINE HYDROCHLORIDE 50 MG/ML
50 INJECTION INTRAMUSCULAR; INTRAVENOUS
OUTPATIENT
Start: 2023-12-14

## 2023-11-02 RX ORDER — EPINEPHRINE 1 MG/ML
0.3 INJECTION, SOLUTION, CONCENTRATE INTRAVENOUS PRN
OUTPATIENT
Start: 2023-12-14

## 2023-11-02 RX ORDER — SODIUM CHLORIDE 9 MG/ML
5-250 INJECTION, SOLUTION INTRAVENOUS PRN
OUTPATIENT
Start: 2023-12-14

## 2023-11-02 RX ORDER — SODIUM CHLORIDE 0.9 % (FLUSH) 0.9 %
5-40 SYRINGE (ML) INJECTION PRN
Status: CANCELLED | OUTPATIENT
Start: 2023-11-02

## 2023-11-02 RX ORDER — SODIUM CHLORIDE 9 MG/ML
INJECTION, SOLUTION INTRAVENOUS CONTINUOUS
Status: CANCELLED | OUTPATIENT
Start: 2023-11-02

## 2023-11-02 RX ORDER — EPINEPHRINE 1 MG/ML
0.3 INJECTION, SOLUTION, CONCENTRATE INTRAVENOUS PRN
OUTPATIENT
Start: 2023-11-23

## 2023-11-02 RX ORDER — ONDANSETRON 2 MG/ML
8 INJECTION INTRAMUSCULAR; INTRAVENOUS
Status: DISCONTINUED | OUTPATIENT
Start: 2023-11-02 | End: 2023-11-03 | Stop reason: HOSPADM

## 2023-11-02 RX ORDER — ALBUTEROL SULFATE 90 UG/1
4 AEROSOL, METERED RESPIRATORY (INHALATION) PRN
Status: CANCELLED | OUTPATIENT
Start: 2023-11-02

## 2023-11-02 RX ORDER — ACETAMINOPHEN 325 MG/1
650 TABLET ORAL
Status: DISCONTINUED | OUTPATIENT
Start: 2023-11-02 | End: 2023-11-03 | Stop reason: HOSPADM

## 2023-11-02 RX ORDER — DIPHENHYDRAMINE HYDROCHLORIDE 50 MG/ML
50 INJECTION INTRAMUSCULAR; INTRAVENOUS
Status: DISCONTINUED | OUTPATIENT
Start: 2023-11-02 | End: 2023-11-03 | Stop reason: HOSPADM

## 2023-11-02 RX ORDER — MEPERIDINE HYDROCHLORIDE 25 MG/ML
12.5 INJECTION INTRAMUSCULAR; INTRAVENOUS; SUBCUTANEOUS PRN
Status: DISCONTINUED | OUTPATIENT
Start: 2023-11-02 | End: 2023-11-03 | Stop reason: HOSPADM

## 2023-11-02 RX ORDER — MEPERIDINE HYDROCHLORIDE 50 MG/ML
12.5 INJECTION INTRAMUSCULAR; INTRAVENOUS; SUBCUTANEOUS PRN
OUTPATIENT
Start: 2023-11-23

## 2023-11-02 RX ORDER — ONDANSETRON 2 MG/ML
8 INJECTION INTRAMUSCULAR; INTRAVENOUS
Status: CANCELLED | OUTPATIENT
Start: 2023-11-02

## 2023-11-02 RX ORDER — SODIUM CHLORIDE 9 MG/ML
5-250 INJECTION, SOLUTION INTRAVENOUS PRN
Status: DISCONTINUED | OUTPATIENT
Start: 2023-11-02 | End: 2023-11-03 | Stop reason: HOSPADM

## 2023-11-02 RX ORDER — SODIUM CHLORIDE 0.9 % (FLUSH) 0.9 %
5-40 SYRINGE (ML) INJECTION PRN
OUTPATIENT
Start: 2023-12-14

## 2023-11-02 RX ORDER — MEPERIDINE HYDROCHLORIDE 50 MG/ML
12.5 INJECTION INTRAMUSCULAR; INTRAVENOUS; SUBCUTANEOUS PRN
OUTPATIENT
Start: 2023-12-14

## 2023-11-02 RX ORDER — DIPHENHYDRAMINE HYDROCHLORIDE 50 MG/ML
50 INJECTION INTRAMUSCULAR; INTRAVENOUS
OUTPATIENT
Start: 2023-11-23

## 2023-11-02 RX ORDER — HEPARIN SODIUM (PORCINE) LOCK FLUSH IV SOLN 100 UNIT/ML 100 UNIT/ML
500 SOLUTION INTRAVENOUS PRN
OUTPATIENT
Start: 2023-12-14

## 2023-11-02 RX ORDER — FAMOTIDINE 10 MG/ML
20 INJECTION, SOLUTION INTRAVENOUS
Status: CANCELLED | OUTPATIENT
Start: 2023-11-02

## 2023-11-02 RX ORDER — EPINEPHRINE 1 MG/ML
0.3 INJECTION, SOLUTION, CONCENTRATE INTRAVENOUS PRN
Status: CANCELLED | OUTPATIENT
Start: 2023-11-02

## 2023-11-02 RX ORDER — HEPARIN SODIUM (PORCINE) LOCK FLUSH IV SOLN 100 UNIT/ML 100 UNIT/ML
500 SOLUTION INTRAVENOUS PRN
Status: CANCELLED | OUTPATIENT
Start: 2023-11-02

## 2023-11-02 RX ORDER — SODIUM CHLORIDE 0.9 % (FLUSH) 0.9 %
5-40 SYRINGE (ML) INJECTION PRN
OUTPATIENT
Start: 2023-11-23

## 2023-11-02 RX ORDER — SODIUM CHLORIDE 0.9 % (FLUSH) 0.9 %
5-40 SYRINGE (ML) INJECTION PRN
Status: DISCONTINUED | OUTPATIENT
Start: 2023-11-02 | End: 2023-11-06 | Stop reason: HOSPADM

## 2023-11-02 RX ORDER — HEPARIN SODIUM (PORCINE) LOCK FLUSH IV SOLN 100 UNIT/ML 100 UNIT/ML
500 SOLUTION INTRAVENOUS PRN
OUTPATIENT
Start: 2023-11-23

## 2023-11-02 RX ORDER — DIPHENHYDRAMINE HYDROCHLORIDE 50 MG/ML
50 INJECTION INTRAMUSCULAR; INTRAVENOUS
Status: CANCELLED | OUTPATIENT
Start: 2023-11-02

## 2023-11-02 RX ORDER — EPINEPHRINE 1 MG/ML
0.3 INJECTION, SOLUTION, CONCENTRATE INTRAVENOUS PRN
Status: DISCONTINUED | OUTPATIENT
Start: 2023-11-02 | End: 2023-11-03 | Stop reason: HOSPADM

## 2023-11-02 RX ORDER — FAMOTIDINE 10 MG/ML
20 INJECTION, SOLUTION INTRAVENOUS
OUTPATIENT
Start: 2023-11-23

## 2023-11-02 RX ORDER — MEPERIDINE HYDROCHLORIDE 50 MG/ML
12.5 INJECTION INTRAMUSCULAR; INTRAVENOUS; SUBCUTANEOUS PRN
Status: CANCELLED | OUTPATIENT
Start: 2023-11-02

## 2023-11-02 RX ORDER — ACETAMINOPHEN 325 MG/1
650 TABLET ORAL
Status: CANCELLED | OUTPATIENT
Start: 2023-11-02

## 2023-11-02 RX ORDER — ACETAMINOPHEN 325 MG/1
650 TABLET ORAL
OUTPATIENT
Start: 2023-12-14

## 2023-11-02 RX ORDER — SODIUM CHLORIDE 9 MG/ML
INJECTION, SOLUTION INTRAVENOUS CONTINUOUS
OUTPATIENT
Start: 2023-11-23

## 2023-11-02 RX ORDER — FAMOTIDINE 10 MG/ML
20 INJECTION, SOLUTION INTRAVENOUS
OUTPATIENT
Start: 2023-12-14

## 2023-11-02 RX ORDER — ALBUTEROL SULFATE 90 UG/1
4 AEROSOL, METERED RESPIRATORY (INHALATION) PRN
OUTPATIENT
Start: 2023-12-14

## 2023-11-02 RX ORDER — SODIUM CHLORIDE 9 MG/ML
INJECTION, SOLUTION INTRAVENOUS CONTINUOUS
OUTPATIENT
Start: 2023-12-14

## 2023-11-02 RX ADMIN — SODIUM CHLORIDE, PRESERVATIVE FREE 10 ML: 5 INJECTION INTRAVENOUS at 13:05

## 2023-11-02 RX ADMIN — SODIUM CHLORIDE, PRESERVATIVE FREE 10 ML: 5 INJECTION INTRAVENOUS at 12:49

## 2023-11-02 RX ADMIN — SODIUM CHLORIDE 200 MG: 9 INJECTION, SOLUTION INTRAVENOUS at 15:25

## 2023-11-02 RX ADMIN — SODIUM CHLORIDE 50 ML/HR: 9 INJECTION, SOLUTION INTRAVENOUS at 15:12

## 2023-11-02 NOTE — PATIENT INSTRUCTIONS
Patient Instructions from Today's Visit    Reason for Visit:  Follow up    Diagnosis Information:  [email or web address] net/about-us/[email or web address]-answers-patient-education-materials/kqtx-pubahdm-gjsz-sheets      Plan:  -Keytruda infusion today.  Next time you will get a bigger dose of Keytruda next time you see Dr Prieto Knows and it will be your last one.   -After you are done with treatment you can follow up with Dr Mirella Pyle and get scan once a year and come back to us as needed since you want to save on visits.   -Continue synthroid to 125mcg    Follow Up:  6 weeks    Recent Lab Results:  Hospital Outpatient Visit on 11/02/2023   Component Date Value Ref Range Status    WBC 11/02/2023 7.5  4.3 - 11.1 K/uL Final    RBC 11/02/2023 4.22 (L)  4.23 - 5.6 M/uL Final    Hemoglobin 11/02/2023 14.5  13.6 - 17.2 g/dL Final    Hematocrit 11/02/2023 42.2  41.1 - 50.3 % Final    MCV 11/02/2023 100.0  82.0 - 102.0 FL Final    MCH 11/02/2023 34.4 (H)  26.1 - 32.9 PG Final    MCHC 11/02/2023 34.4  31.4 - 35.0 g/dL Final    RDW 11/02/2023 12.3  11.9 - 14.6 % Final    Platelets 53/30/1122 251  150 - 450 K/uL Final    MPV 11/02/2023 9.1 (L)  9.4 - 12.3 FL Final    nRBC 11/02/2023 0.00  0.0 - 0.2 K/uL Final    **Note: Absolute NRBC parameter is now reported with Hemogram**    Differential Type 11/02/2023 AUTOMATED    Final    Neutrophils % 11/02/2023 77  43 - 78 % Final    Lymphocytes % 11/02/2023 12 (L)  13 - 44 % Final    Monocytes % 11/02/2023 8  4.0 - 12.0 % Final    Eosinophils % 11/02/2023 2  0.5 - 7.8 % Final    Basophils % 11/02/2023 1  0.0 - 2.0 % Final    Immature Granulocytes 11/02/2023 0  0.0 - 5.0 % Final    Neutrophils Absolute 11/02/2023 5.8  1.7 - 8.2 K/UL Final    Lymphocytes Absolute 11/02/2023 0.9  0.5 - 4.6 K/UL Final    Monocytes Absolute 11/02/2023 0.6  0.1 - 1.3 K/UL Final    Eosinophils Absolute 11/02/2023 0.1  0.0 - 0.8 K/UL Final    Basophils Absolute 11/02/2023 0.1  0.0 - 0.2 K/UL Final    Absolute Immature Granulocyte

## 2023-11-02 NOTE — PROGRESS NOTES
Arrived to the [facility]. C14D1 Keytruda infusion completed. Patient tolerated well. Any issues or concerns during appointment: none. Patient aware of next infusion appointment on 11/24/23 (date) at [phone] (time). Patient aware of next lab and [facility] office visit on 12/14/23 (date) at [phone] (time). Patient instructed to call provider with temperature of 100.4 or greater or nausea/vomiting/ diarrhea or pain not controlled by medications  Discharged home ambulatory.

## 2023-11-03 ENCOUNTER — HOSPITAL ENCOUNTER (OUTPATIENT)
Dept: PHYSICAL THERAPY | Age: 78
Setting detail: RECURRING SERIES
Discharge: HOME | End: 2023-11-06
Payer: MEDICARE

## 2023-11-03 PROCEDURE — 97162 PT EVAL MOD COMPLEX 30 MIN: CPT

## 2023-11-03 PROCEDURE — 97110 THERAPEUTIC EXERCISES: CPT

## 2023-11-03 ASSESSMENT — PAIN SCALES - GENERAL: PAINLEVEL_OUTOF10: 1

## 2023-11-03 NOTE — THERAPY EVALUATION
Karla Love  : 1945  Primary: [facility] (Medicare Managed)  Secondary: Steven Prescott @ [address] [facility] [address]  Phone: [phone]  Fax: [phone] Plan Frequency: 2 times a week for 6 weeks    Plan of Care/Certification Expiration Date: 23      PT Visit Info:  Plan Frequency: 2 times a week for 6 weeks  Plan of Care/Certification Expiration Date: 23      Visit Count:  1                OUTPATIENT PHYSICAL THERAPY:             Initial Assessment 11/3/2023               Episode (back and L leg pain) Appt Desk         Treatment Diagnosis:  Difficulty in walking, Not elsewhere classified (R26.2)  Other abnormalities of gait and mobility (R26.89)  Generalized weakness (M62.81)  Medical/Referring Diagnosis:  Spinal stenosis of lumbar region with neurogenic claudication [M48.062]  Left leg pain [M79.605]  Referring Physician:  Jazmyne Dawson MD MD Orders:  PT Eval and Treat   Return MD Appt:  TBD  Date of Onset:  Onset Date:  (unknown)      Allergies:  Lisinopril  Restrictions/Precautions:    Restrictions/Precautions: Cardiac; Other (comment) (hx includes cardioversion and malignant melanoma)     Medications Last Reviewed:  11/3/2023     SUBJECTIVE   History of Injury/Illness (Reason for Referral):  Mr. Karla Love is a 77 y.o. male presenting to physical therapy with complaints of left leg pain with walking. Pain radiates from L posterior thigh to back of ankle and anterior L foot. Patient reports that he received cortisone injections x3 in back; they did not help. This morning before coming in to the clinic, he awoke with pain, and it continued to last for 5 hours. Patient reports that he sleeps in recliner. He is unable to sleep laying fully flat. Pain medication such as ibuprofen or topical cream for  arthritic pain tend to alleviate pain.  Patient has to take two

## 2023-11-06 NOTE — PROGRESS NOTES
Chaim Christopher  : 1945  Primary: [facility] (Medicare Managed)  Secondary: Steven Prescott @ [address] [facility] [address]  Phone: [phone]  Fax: [phone] Plan Frequency: 2 times a week for 6 weeks    Plan of Care/Certification Expiration Date: 23      >PT Visit Info:  Plan Frequency: 2 times a week for 6 weeks  Plan of Care/Certification Expiration Date: 23      Visit Count:  1    OUTPATIENT PHYSICAL THERAPY:OP NOTE TYPE: Treatment Note 11/3/2023       Episode  }Appt Desk             Treatment Diagnosis:  Difficulty in walking, Not elsewhere classified (R26.2)  Other abnormalities of gait and mobility (R26.89)  Abnormal posture (R29.3)  Medical/Referring Diagnosis:  Spinal stenosis of lumbar region with neurogenic claudication [M48.062]  Left leg pain [M79.605]  Referring Physician:  Ángela Chairez MD MD Orders:  PT Eval and Treat   Date of Onset:  Onset Date:  (unknown)     Allergies:   Lisinopril  Restrictions/Precautions:  Restrictions/Precautions: Cardiac; Other (comment) (hx includes cardioversion and malignant melanoma)  No data recorded   Interventions Planned (Treatment may consist of any combination of the following):    Current Treatment Recommendations: Strengthening; ROM; Balance training; Functional mobility training; Transfer training; ADL/Self-care training; IADL training; Endurance training; Gait training; Stair training; Neuromuscular re-education; Manual; Pain management; Home exercise program; Safety education & training; Patient/Caregiver education & training; Equipment evaluation, education, & procurement; Modalities; Positioning; Dry needling; Therapeutic activities     >Subjective Comments:  Patient reports that it took 5 hours for his L leg pain to go away this morning.   >Initial:     1/10>Post Session:       5/10  Medications Last Reviewed:

## 2023-11-07 ENCOUNTER — HOSPITAL ENCOUNTER (OUTPATIENT)
Dept: PHYSICAL THERAPY | Age: 78
Setting detail: RECURRING SERIES
Discharge: HOME | End: 2023-11-10
Payer: MEDICARE

## 2023-11-07 PROCEDURE — 97110 THERAPEUTIC EXERCISES: CPT

## 2023-11-07 PROCEDURE — 97140 MANUAL THERAPY 1/> REGIONS: CPT

## 2023-11-07 ASSESSMENT — PAIN SCALES - GENERAL: PAINLEVEL_OUTOF10: 2

## 2023-11-07 NOTE — PROGRESS NOTES
11/7/2023  Updated Objective Findings:   Adjusted cane to height and angle. Treatment   THERAPEUTIC EXERCISE: (40 minutes):    Exercises per grid below to improve mobility, strength, balance, and coordination. Required moderate visual, verbal, manual, and tactile cues to promote proper body alignment and promote proper body posture. Progressed range, repetitions, and complexity of movement as indicated. Date:  11/3/23 Date:  11/7/23 Date:     Activity/Exercise Parameters Parameters Parameters   Hamstring stretch Seated 3 x 30sec Supin with strap 4x30 sec hold     Gait training 50 ft x 2 using quad cane; verbal cues for technique 150 feet x 2 with quad cane verbal cuing not to lean onto cane with ambulation    Piriformis stretch Next visit Supine BLE's 4x30 sec hold each     Pelvic tilts Next visit X 10 reps 5 sec hold     IT band stretch Next visit Strap 4x30 sec hold each     Plyo ball roll outs Next visit ------    Dead bugs  ----    Clam shells  -----    Slant board  ------    Hip flexion  Standing x 20 reps at bar    Supine LE flexion & extension  Green swiss ball x 20 reps in comfort zone    Trunk rotation   Supine BLE's on green swiss ball in comfort range x 20 reps     Hamstring curls  X 20 reps standing at bar BLE's     Hip abduction   Standing x 10 reps each LE at bar    Long arc quads  Seated x 10 reps BLE's 5 sec hold each     Side stepping   At wall 20 ft. X 4 reps     Transfer training skills  X 5 mins supine to sit, sit to stand, transfers with rolling walker            MANUAL THERAPY: (x 15 minutes):   Joint mobilization and Soft tissue mobilization was utilized and necessary because of the patient's restricted joint motion, loss of articular motion, and restricted motion of soft tissue.   -Pt. Received soft tissue mobilization to left hip and IT band in sidelying to decrease pain and tightness    MODALITIES: (8 minutes)       Pt.  Received ice pack to left hip and IT band in right sidelying

## 2023-11-08 ENCOUNTER — PREP FOR PROCEDURE (OUTPATIENT)
Dept: ORTHOPEDIC SURGERY | Age: 78
End: 2023-11-08

## 2023-11-08 ENCOUNTER — OFFICE VISIT (OUTPATIENT)
Dept: ORTHOPEDIC SURGERY | Age: 78
End: 2023-11-08
Payer: MEDICARE

## 2023-11-08 ENCOUNTER — CLINICAL DOCUMENTATION (OUTPATIENT)
Dept: INFUSION THERAPY | Age: 78
End: 2023-11-08

## 2023-11-08 ENCOUNTER — CLINICAL DOCUMENTATION (OUTPATIENT)
Dept: ORTHOPEDIC SURGERY | Age: 78
End: 2023-11-08

## 2023-11-08 DIAGNOSIS — M54.16 LUMBAR RADICULOPATHY: Primary | ICD-10-CM

## 2023-11-08 DIAGNOSIS — M79.605 LEFT LEG PAIN: ICD-10-CM

## 2023-11-08 DIAGNOSIS — M48.062 SPINAL STENOSIS OF LUMBAR REGION WITH NEUROGENIC CLAUDICATION: Primary | ICD-10-CM

## 2023-11-08 PROCEDURE — 99213 OFFICE O/P EST LOW 20 MIN: CPT | Performed by: ORTHOPAEDIC SURGERY

## 2023-11-08 PROCEDURE — 1123F ACP DISCUSS/DSCN MKR DOCD: CPT | Performed by: ORTHOPAEDIC SURGERY

## 2023-11-08 NOTE — PROGRESS NOTES
Faxed letter to [facility] at [phone] for cardiac clearance and to hold Eliquis for 5 days.

## 2023-11-08 NOTE — PROGRESS NOTES
Faxed letter to [facility] at [phone] for cardiac clearance and to hold Eliquis for 5 days.

## 2023-11-08 NOTE — PROGRESS NOTES
Name: Edel Ronquillo  YOB: 1945  Gender: male  MRN: [id number]  Age: 77 y.o. Chief Complaint: Buttock pain and posterior left leg pain    History of Present Illness: This is a very pleasant 77 y.o. male who presents with a 3-month history of buttock pain and left posterior leg pain. We have seen in the past and tried injections which provided a short amount of relief. He is done physical therapy and he states that the physical therapy did not help him and may even have made it a little worse. At this moment his pain level is 5 out of 10 in severity. He is using a cane to ambulate secondary to the pain in his leg. Medications:       Current Outpatient Medications:     levothyroxine (SYNTHROID) 125 MCG tablet, Take 1 tablet by mouth daily, Disp: 90 tablet, Rfl: 1    nystatin (MYCOSTATIN) 017025 UNIT/GM powder, Apply 3 times daily to affected areas. , Disp: 60 g, Rfl: 1    tamsulosin (FLOMAX) 0.4 MG capsule, 1 capsule daily, Disp: , Rfl:     finasteride (PROSCAR) 5 MG tablet, , Disp: , Rfl:     losartan-hydroCHLOROthiazide (HYZAAR) 100-25 MG per tablet, Take 1 tablet by mouth daily, Disp: , Rfl:     apixaban (ELIQUIS) 5 MG TABS tablet, Take 1 tablet by mouth 2 times daily Hold 48 hours prior to surgery per medication hold clearance / [facility], Disp: , Rfl:     atorvastatin (LIPITOR) 20 MG tablet, Take 1 tablet by mouth every morning, Disp: , Rfl:     cetirizine (ZYRTEC) 10 MG tablet, Take 1 tablet by mouth daily, Disp: , Rfl:     metoprolol tartrate (LOPRESSOR) 25 MG tablet, Take 1 tablet by mouth 2 times daily, Disp: , Rfl:     Multiple Vitamin (MULTIVITAMINS PO), Take 1 tablet by mouth daily, Disp: , Rfl:     Allergies: Allergies   Allergen Reactions    Lisinopril Cough         Physical Exam:     This is a well developed well nourished male adult in no acute distress. Mood and affect are appropriate. Oriented to person, place, and time.     Respirations are

## 2023-11-08 NOTE — PROGRESS NOTES
Patient Assistance        Navigator Type:  Infusion  Documentation Type: Assistance Review  Navigation Status: Free Drug Enrollment  Status of Patient Insurance Coverage: Patient has active coverage          Additional notes: free Keytruda 1/1/2024-12/31/2024    Drug Name: Blase Fulling  Form of PAP Assistance: Free Drug

## 2023-11-10 ENCOUNTER — HOSPITAL ENCOUNTER (OUTPATIENT)
Dept: PHYSICAL THERAPY | Age: 78
Setting detail: RECURRING SERIES
Discharge: HOME | End: 2023-11-13
Payer: MEDICARE

## 2023-11-10 PROCEDURE — 97140 MANUAL THERAPY 1/> REGIONS: CPT

## 2023-11-10 PROCEDURE — 97110 THERAPEUTIC EXERCISES: CPT

## 2023-11-10 ASSESSMENT — PAIN SCALES - GENERAL: PAINLEVEL_OUTOF10: 2

## 2023-11-10 NOTE — PROGRESS NOTES
transfers with rolling walker    Bent knee fall outs   X 10 reps; cued to stay in tolerable range     MANUAL THERAPY: (50 minutes):   Joint mobilization and Soft tissue mobilization was utilized and necessary because of the patient's restricted joint motion, loss of articular motion, and restricted motion of soft tissue.   -Pt. Received soft tissue mobilization to left gastroc/soleus, quadriceps, hamstring, distal IT band, L lumbar paraspinals in sidelying to decrease pain and tightness    MODALITIES: (20 minutes total - not billed)       Pt. Received heat pack to distal IT band and quadriceps, then heat pack moved to gastroc - 10 minutes each - muscle relaxation. Heat pack was alternated with site of manual treatment. Treatment/Session Summary:    >Treatment Assessment:   Patient tolerated session well overall. Radiating pain to L leg was absent majority of session; emphasis of above activities was mostly to stretch and work within flexion today. However, patient reported 10/10 L leg pain following seated lumbar flexion exercise near end of session. Pain reduced with passive L hip extension in R sidelying. Patient was encouraged to trial this at home to see if this will continue to help relieve sudden onsets of radiating symptoms. If not, stop and rest with L leg in flexion. Further monitoring of symptoms and movements that promote relief is recommended. Reinforcement of above therex and interventions will be beneficial at this time. Patient is nervous and expressed concerns about forgetting what is spoken about during PT sessions. Communication/Consultation:   Pt encouraged to contact this PT to follow up about any concerns pertaining to symptoms, symptom management and HEP. Importance of routine movement within tolerable ROM reviewed. Use of heat at L hip and thigh for pain relief encouraged. Equipment provided today:  Updated HEP issued.   Recommendations/Intent for next treatment session: Next visit will

## 2023-11-13 ENCOUNTER — TELEPHONE (OUTPATIENT)
Dept: ORTHOPEDIC SURGERY | Age: 78
End: 2023-11-13

## 2023-11-13 NOTE — TELEPHONE ENCOUNTER
I called spoke to patient regarding pre assessment appointment on 11/15/23 at 11:15 am. Patient is aware of time, date, and location.

## 2023-11-14 ENCOUNTER — TELEPHONE (OUTPATIENT)
Dept: ORTHOPEDIC SURGERY | Age: 78
End: 2023-11-14

## 2023-11-14 NOTE — TELEPHONE ENCOUNTER
Called spoke to patients spouse states PT discharged him form PT due to PT isn't helping and its causing hi pain to increase. Per Dr. Angela Skaggs patient can discontinue PT. Patient is aware.

## 2023-11-15 ENCOUNTER — HOSPITAL ENCOUNTER (OUTPATIENT)
Dept: SURGERY | Age: 78
Discharge: HOME | End: 2023-11-18
Payer: MEDICARE

## 2023-11-15 VITALS
HEIGHT: 71 IN | HEART RATE: 79 BPM | WEIGHT: 177.2 LBS | OXYGEN SATURATION: 98 % | RESPIRATION RATE: 18 BRPM | TEMPERATURE: 79 F | BODY MASS INDEX: 24.81 KG/M2

## 2023-11-15 PROCEDURE — 87641 MR-STAPH DNA AMP PROBE: CPT

## 2023-11-15 NOTE — PROGRESS NOTES
Patient verified name and     Order for consent not found in EHR and matches case posting; patient verified. Type 2 surgery, walk in assessment complete. Labs per surgeon: MRSA, ;   Labs per anesthesia protocol: Hgb ; results WNL 23  EK22, echo 8/15/23, Eliquis hold from Leia Olson NP in Care everywhere 11/10/23    MRSA/MSSA swab collected; pharmacy to review and dose antibiotic as appropriate. Hospital approved surgical skin cleanser and instructions given per hospital policy. Patient provided with and instructed on educational handouts including Guide to Surgery, Pain Management, Hand Hygiene, Blood Transfusion Education, and [facility] Brochure. Patient answered medical/surgical history questions at their best of ability. All prior to admission medications documented in [facility]. Original medication prescription bottle not visualized during patient appointment. Patient instructed to hold all vitamins 7 days prior to surgery and NSAIDS 5 days prior to surgery, patient verbalized understanding. Patient teach back successful and patient demonstrates knowledge of instructions.

## 2023-11-15 NOTE — PROGRESS NOTES
PLEASE CONTINUE TAKING ALL PRESCRIPTION MEDICATIONS UP TO THE DAY OF SURGERY UNLESS OTHERWISE DIRECTED BELOW. You may take Tylenol, allergy,  and/or indigestion medications. TAKE ONLY THESE MEDICATIONS ON THE DAY OF SURGERY    Atorvastatin , Finasteride, Levothyroxine, Metoprolol, Tamsulosin            DISCONTINUE all vitamins and supplements 7 days prior to surgery. DISCONTINUE Non-Steroidal Anti-Inflammatory (NSAIDS) such as Advil and Aleve 5 days prior to surgery. Home Medications to Hold- please continue all other medications except these. Eliquis as directed         Comments      On the day before surgery please take 2 Tylenol in the morning and then again before bed. You may use either regular or extra strength. Please do not bring home medications with you on the day of surgery unless otherwise directed by your nurse. If you are instructed to bring home medications, please give them to your nurse as they will be administered by the nursing staff. If you have any questions, please call [address] [place] [address] [phone]. A copy of this note was provided to the patient for reference.

## 2023-11-16 ENCOUNTER — CLINICAL DOCUMENTATION (OUTPATIENT)
Dept: NEUROSURGERY | Age: 78
End: 2023-11-16

## 2023-11-16 LAB
MRSA DNA SPEC QL NAA+PROBE: NOT DETECTED
S AUREUS CPE NOSE QL NAA+PROBE: DETECTED

## 2023-11-17 ENCOUNTER — APPOINTMENT (OUTPATIENT)
Dept: PHYSICAL THERAPY | Age: 78
End: 2023-11-17
Payer: MEDICARE

## 2023-11-17 DIAGNOSIS — M54.16 LUMBAR RADICULOPATHY: Primary | ICD-10-CM

## 2023-11-17 RX ORDER — OXYCODONE HYDROCHLORIDE 5 MG/1
5 TABLET ORAL EVERY 6 HOURS PRN
Qty: 20 TABLET | Refills: 0 | Status: CANCELLED | OUTPATIENT
Start: 2023-11-17 | End: 2023-11-22

## 2023-11-17 NOTE — TELEPHONE ENCOUNTER
I spoke with this Patient wife about moving the surgery states he is in so much patient and was thinking of even going to the ER before I called to move his surgery. Can he have something for pain.

## 2023-11-18 RX ORDER — TRAMADOL HYDROCHLORIDE 50 MG/1
50 TABLET ORAL EVERY 8 HOURS PRN
Qty: 21 TABLET | Refills: 0 | Status: SHIPPED | OUTPATIENT
Start: 2023-11-18 | End: 2023-11-25

## 2023-11-21 ENCOUNTER — TELEPHONE (OUTPATIENT)
Dept: ORTHOPEDIC SURGERY | Age: 78
End: 2023-11-21

## 2023-11-21 ENCOUNTER — APPOINTMENT (OUTPATIENT)
Dept: PHYSICAL THERAPY | Age: 78
End: 2023-11-21
Payer: MEDICARE

## 2023-11-22 ENCOUNTER — TELEPHONE (OUTPATIENT)
Dept: ORTHOPEDIC SURGERY | Age: 78
End: 2023-11-22

## 2023-11-22 NOTE — TELEPHONE ENCOUNTER
Patient would like a return call please to let him know if his surgery has been approved for 11/27/23 so that he will know if he needs to stop taking his blood pressure medicine.

## 2023-11-22 NOTE — TELEPHONE ENCOUNTER
Called spoke to spouse she is aware I'm waiting on response from authorization department if surgery has been approved for 11/27/2023. She si aware I will call her when I receive a response. She verbalized understanding.

## 2023-11-22 NOTE — TELEPHONE ENCOUNTER
Called spoke to patient spouse she is aware still awaiting authorization for surgery and I'm waiting for update from authorization department.

## 2023-11-24 ENCOUNTER — HOSPITAL ENCOUNTER (OUTPATIENT)
Dept: INFUSION THERAPY | Age: 78
Discharge: HOME | End: 2023-11-24
Payer: MEDICARE

## 2023-11-24 VITALS
TEMPERATURE: 97.7 F | BODY MASS INDEX: 24.88 KG/M2 | SYSTOLIC BLOOD PRESSURE: 146 MMHG | DIASTOLIC BLOOD PRESSURE: 73 MMHG | OXYGEN SATURATION: 96 % | RESPIRATION RATE: 18 BRPM | HEART RATE: 88 BPM | WEIGHT: 178.4 LBS

## 2023-11-24 DIAGNOSIS — M54.16 LUMBAR RADICULOPATHY: ICD-10-CM

## 2023-11-24 DIAGNOSIS — C43.71 MELANOMA OF RIGHT POSTERIOR CALF (HCC): Primary | ICD-10-CM

## 2023-11-24 LAB
ALBUMIN SERPL-MCNC: 3.4 G/DL (ref 3.2–4.6)
ALBUMIN/GLOB SERPL: 0.9 (ref 0.4–1.6)
ALP SERPL-CCNC: 124 U/L (ref 50–136)
ALT SERPL-CCNC: 43 U/L (ref 12–65)
ANION GAP SERPL CALC-SCNC: 5 MMOL/L (ref 2–11)
AST SERPL-CCNC: 22 U/L (ref 15–37)
BASOPHILS # BLD: 0.1 K/UL (ref 0–0.2)
BASOPHILS NFR BLD: 1 % (ref 0–2)
BILIRUB SERPL-MCNC: 0.3 MG/DL (ref 0.2–1.1)
BUN SERPL-MCNC: 15 MG/DL (ref 8–23)
CALCIUM SERPL-MCNC: 9.1 MG/DL (ref 8.3–10.4)
CHLORIDE SERPL-SCNC: 106 MMOL/L (ref 101–110)
CO2 SERPL-SCNC: 27 MMOL/L (ref 21–32)
CREAT SERPL-MCNC: 0.9 MG/DL (ref 0.8–1.5)
DIFFERENTIAL METHOD BLD: ABNORMAL
EOSINOPHIL # BLD: 0.4 K/UL (ref 0–0.8)
EOSINOPHIL NFR BLD: 5 % (ref 0.5–7.8)
ERYTHROCYTE [DISTWIDTH] IN BLOOD BY AUTOMATED COUNT: 12.2 % (ref 11.9–14.6)
GLOBULIN SER CALC-MCNC: 3.8 G/DL (ref 2.8–4.5)
GLUCOSE SERPL-MCNC: 181 MG/DL (ref 65–100)
HCT VFR BLD AUTO: 40.3 % (ref 41.1–50.3)
HGB BLD-MCNC: 13.6 G/DL (ref 13.6–17.2)
IMM GRANULOCYTES # BLD AUTO: 0.1 K/UL (ref 0–0.5)
IMM GRANULOCYTES NFR BLD AUTO: 1 % (ref 0–5)
LYMPHOCYTES # BLD: 0.7 K/UL (ref 0.5–4.6)
LYMPHOCYTES NFR BLD: 8 % (ref 13–44)
MCH RBC QN AUTO: 33.7 PG (ref 26.1–32.9)
MCHC RBC AUTO-ENTMCNC: 33.7 G/DL (ref 31.4–35)
MCV RBC AUTO: 100 FL (ref 82–102)
MONOCYTES # BLD: 0.6 K/UL (ref 0.1–1.3)
MONOCYTES NFR BLD: 7 % (ref 4–12)
NEUTS SEG # BLD: 6.4 K/UL (ref 1.7–8.2)
NEUTS SEG NFR BLD: 78 % (ref 43–78)
NRBC # BLD: 0 K/UL (ref 0–0.2)
PLATELET # BLD AUTO: 259 K/UL (ref 150–450)
PMV BLD AUTO: 9 FL (ref 9.4–12.3)
POTASSIUM SERPL-SCNC: 3.5 MMOL/L (ref 3.5–5.1)
PROT SERPL-MCNC: 7.2 G/DL (ref 6.3–8.2)
RBC # BLD AUTO: 4.03 M/UL (ref 4.23–5.6)
SODIUM SERPL-SCNC: 138 MMOL/L (ref 133–143)
TSH, 3RD GENERATION: 3.19 UIU/ML (ref 0.36–3.74)
WBC # BLD AUTO: 8.1 K/UL (ref 4.3–11.1)

## 2023-11-24 PROCEDURE — 85025 COMPLETE CBC W/AUTO DIFF WBC: CPT

## 2023-11-24 PROCEDURE — 84443 ASSAY THYROID STIM HORMONE: CPT

## 2023-11-24 PROCEDURE — 6360000002 HC RX W HCPCS: Performed by: INTERNAL MEDICINE

## 2023-11-24 PROCEDURE — 80053 COMPREHEN METABOLIC PANEL: CPT

## 2023-11-24 PROCEDURE — 2580000003 HC RX 258: Performed by: INTERNAL MEDICINE

## 2023-11-24 PROCEDURE — 96413 CHEMO IV INFUSION 1 HR: CPT

## 2023-11-24 RX ORDER — ALBUTEROL SULFATE 90 UG/1
4 AEROSOL, METERED RESPIRATORY (INHALATION) PRN
Status: DISCONTINUED | OUTPATIENT
Start: 2023-11-24 | End: 2023-11-25 | Stop reason: HOSPADM

## 2023-11-24 RX ORDER — DIPHENHYDRAMINE HYDROCHLORIDE 50 MG/ML
50 INJECTION INTRAMUSCULAR; INTRAVENOUS
Status: DISCONTINUED | OUTPATIENT
Start: 2023-11-24 | End: 2023-11-25 | Stop reason: HOSPADM

## 2023-11-24 RX ORDER — ACETAMINOPHEN 325 MG/1
650 TABLET ORAL
Status: DISCONTINUED | OUTPATIENT
Start: 2023-11-24 | End: 2023-11-25 | Stop reason: HOSPADM

## 2023-11-24 RX ORDER — SODIUM CHLORIDE 9 MG/ML
5-250 INJECTION, SOLUTION INTRAVENOUS PRN
Status: DISCONTINUED | OUTPATIENT
Start: 2023-11-24 | End: 2023-11-25 | Stop reason: HOSPADM

## 2023-11-24 RX ORDER — MEPERIDINE HYDROCHLORIDE 25 MG/ML
12.5 INJECTION INTRAMUSCULAR; INTRAVENOUS; SUBCUTANEOUS PRN
Status: DISCONTINUED | OUTPATIENT
Start: 2023-11-24 | End: 2023-11-25 | Stop reason: HOSPADM

## 2023-11-24 RX ORDER — ONDANSETRON 2 MG/ML
8 INJECTION INTRAMUSCULAR; INTRAVENOUS
Status: DISCONTINUED | OUTPATIENT
Start: 2023-11-24 | End: 2023-11-25 | Stop reason: HOSPADM

## 2023-11-24 RX ORDER — EPINEPHRINE 1 MG/ML
0.3 INJECTION, SOLUTION, CONCENTRATE INTRAVENOUS PRN
Status: DISCONTINUED | OUTPATIENT
Start: 2023-11-24 | End: 2023-11-25 | Stop reason: HOSPADM

## 2023-11-24 RX ORDER — SODIUM CHLORIDE 0.9 % (FLUSH) 0.9 %
5-40 SYRINGE (ML) INJECTION PRN
Status: DISCONTINUED | OUTPATIENT
Start: 2023-11-24 | End: 2023-11-25 | Stop reason: HOSPADM

## 2023-11-24 RX ORDER — TRAMADOL HYDROCHLORIDE 50 MG/1
50 TABLET ORAL EVERY 8 HOURS PRN
Qty: 21 TABLET | Refills: 0 | Status: SHIPPED | OUTPATIENT
Start: 2023-11-24 | End: 2023-12-01

## 2023-11-24 RX ADMIN — SODIUM CHLORIDE 50 ML/HR: 9 INJECTION, SOLUTION INTRAVENOUS at 15:06

## 2023-11-24 RX ADMIN — SODIUM CHLORIDE, PRESERVATIVE FREE 10 ML: 5 INJECTION INTRAVENOUS at 16:00

## 2023-11-24 RX ADMIN — SODIUM CHLORIDE 200 MG: 9 INJECTION, SOLUTION INTRAVENOUS at 15:24

## 2023-11-24 NOTE — PROGRESS NOTES
Arrived to the [address] [facility]. Labs and [place] completed. Patient tolerated without problems. Any issues or concerns during appointment: no.  Patient aware of next infusion appointment on 12/14/23 (date) at 1500 (time).   Patient instructed to call provider with temperature of 100.4 or greater or nausea/vomiting/ diarrhea or pain not controlled by medications  Discharged ambulatory

## 2023-11-24 NOTE — TELEPHONE ENCOUNTER
Called spoke to patient to let him surgery will have to be rescheduled to Thursday; authorization is pending. Patient states he has 2 Tramadol left would like to know if Rx can be sent into pharmacy. I will forward message to CDV he is on call. He will review and send in Rx.

## 2023-11-27 ENCOUNTER — CLINICAL DOCUMENTATION (OUTPATIENT)
Dept: ORTHOPEDIC SURGERY | Age: 78
End: 2023-11-27

## 2023-11-27 ENCOUNTER — TELEPHONE (OUTPATIENT)
Dept: ORTHOPEDIC SURGERY | Age: 78
End: 2023-11-27

## 2023-11-27 NOTE — PROGRESS NOTES
I called [place] Medicare appeals department to check on status auth for surgery spoke to [facility] call DPR#[id number] at [phone]; stated an appeal hasn't been filed can start an appeal today for ref#[id number]. States it will take 72 hours to get a response and Medicare appeals team will contact the office in 24 hours if any additional information is needed. Appeals OOO#[id number]. Fax additional notes to [phone]. All notes were faxed with the denial letter and cover letter addressing the denial letter (faxed by Laura Nicole). Case has been expedited and requested a fast appeal.    I called spoke to patient they are aware of the above.

## 2023-11-27 NOTE — TELEPHONE ENCOUNTER
I called [facility] pharmacy verified Rx was received on 11/24/23. Per pharmacists Rx was received and they will fill it today. I called spoke to Mrs. Shamir Lopez she is aware of the above.

## 2023-11-27 NOTE — TELEPHONE ENCOUNTER
I called spoke to Mrs. Luís Marquis she states the hospital called asked if they were coming for surgery today. States the Rx isn't at pharmacy for Tramadol. She is aware a message was sent to through case message to move surgery to 11/30/23 due to pending authorization. They probably didn't check messages before calling her. She is aware the Rx was sent to [facility] on [address] on 11/24/23. I will call the pharmacy to check to see if they have received the Rx and call her back. She verbalized understanding.

## 2023-11-28 ENCOUNTER — TELEPHONE (OUTPATIENT)
Dept: ORTHOPEDIC SURGERY | Age: 78
End: 2023-11-28

## 2023-11-28 ENCOUNTER — APPOINTMENT (OUTPATIENT)
Dept: PHYSICAL THERAPY | Age: 78
End: 2023-11-28
Payer: MEDICARE

## 2023-11-28 NOTE — TELEPHONE ENCOUNTER
Called to let patient know surgery has been approved for 11/30/23. LM on patients secure vm. Patient is aware to call the office with any questions.

## 2023-11-29 ENCOUNTER — TELEPHONE (OUTPATIENT)
Dept: ORTHOPEDIC SURGERY | Age: 78
End: 2023-11-29

## 2023-11-29 ENCOUNTER — ANESTHESIA EVENT (OUTPATIENT)
Dept: SURGERY | Age: 78
End: 2023-11-29
Payer: MEDICARE

## 2023-11-29 NOTE — TELEPHONE ENCOUNTER
He is having surgery tomorrow and has not gotten a call about what time to be there. Can you check on this and let his wife know.

## 2023-11-30 ENCOUNTER — APPOINTMENT (OUTPATIENT)
Dept: GENERAL RADIOLOGY | Age: 78
End: 2023-11-30
Attending: ORTHOPAEDIC SURGERY
Payer: MEDICARE

## 2023-11-30 ENCOUNTER — ANESTHESIA (OUTPATIENT)
Dept: SURGERY | Age: 78
End: 2023-11-30
Payer: MEDICARE

## 2023-11-30 ENCOUNTER — HOSPITAL ENCOUNTER (OUTPATIENT)
Age: 78
Discharge: HOME | End: 2023-12-01
Attending: ORTHOPAEDIC SURGERY | Admitting: ORTHOPAEDIC SURGERY
Payer: MEDICARE

## 2023-11-30 DIAGNOSIS — M54.16 LUMBAR RADICULOPATHY: Primary | ICD-10-CM

## 2023-11-30 PROCEDURE — 2580000003 HC RX 258

## 2023-11-30 PROCEDURE — 97530 THERAPEUTIC ACTIVITIES: CPT

## 2023-11-30 PROCEDURE — 3700000001 HC ADD 15 MINUTES (ANESTHESIA): Performed by: ORTHOPAEDIC SURGERY

## 2023-11-30 PROCEDURE — 6360000002 HC RX W HCPCS: Performed by: ORTHOPAEDIC SURGERY

## 2023-11-30 PROCEDURE — 6370000000 HC RX 637 (ALT 250 FOR IP): Performed by: ANESTHESIOLOGY

## 2023-11-30 PROCEDURE — 2500000003 HC RX 250 WO HCPCS

## 2023-11-30 PROCEDURE — 2709999900 HC NON-CHARGEABLE SUPPLY: Performed by: ORTHOPAEDIC SURGERY

## 2023-11-30 PROCEDURE — 2580000003 HC RX 258: Performed by: ORTHOPAEDIC SURGERY

## 2023-11-30 PROCEDURE — 3600000014 HC SURGERY LEVEL 4 ADDTL 15MIN: Performed by: ORTHOPAEDIC SURGERY

## 2023-11-30 PROCEDURE — 2720000010 HC SURG SUPPLY STERILE: Performed by: ORTHOPAEDIC SURGERY

## 2023-11-30 PROCEDURE — 2500000003 HC RX 250 WO HCPCS: Performed by: ORTHOPAEDIC SURGERY

## 2023-11-30 PROCEDURE — 6360000002 HC RX W HCPCS

## 2023-11-30 PROCEDURE — 3700000000 HC ANESTHESIA ATTENDED CARE: Performed by: ORTHOPAEDIC SURGERY

## 2023-11-30 PROCEDURE — 7100000000 HC PACU RECOVERY - FIRST 15 MIN: Performed by: ORTHOPAEDIC SURGERY

## 2023-11-30 PROCEDURE — 3600000004 HC SURGERY LEVEL 4 BASE: Performed by: ORTHOPAEDIC SURGERY

## 2023-11-30 PROCEDURE — 2580000003 HC RX 258: Performed by: ANESTHESIOLOGY

## 2023-11-30 PROCEDURE — 63048 LAM FACETEC &FORAMOT EA ADDL: CPT | Performed by: ORTHOPAEDIC SURGERY

## 2023-11-30 PROCEDURE — 63047 LAM FACETEC & FORAMOT LUMBAR: CPT | Performed by: ORTHOPAEDIC SURGERY

## 2023-11-30 PROCEDURE — 6360000002 HC RX W HCPCS: Performed by: ANESTHESIOLOGY

## 2023-11-30 PROCEDURE — 7100000001 HC PACU RECOVERY - ADDTL 15 MIN: Performed by: ORTHOPAEDIC SURGERY

## 2023-11-30 PROCEDURE — 6370000000 HC RX 637 (ALT 250 FOR IP): Performed by: ORTHOPAEDIC SURGERY

## 2023-11-30 PROCEDURE — 97161 PT EVAL LOW COMPLEX 20 MIN: CPT

## 2023-11-30 RX ORDER — FINASTERIDE 5 MG/1
5 TABLET, FILM COATED ORAL DAILY
Status: DISCONTINUED | OUTPATIENT
Start: 2023-12-01 | End: 2023-12-01 | Stop reason: HOSPADM

## 2023-11-30 RX ORDER — OXYCODONE HYDROCHLORIDE 5 MG/1
5 TABLET ORAL
Status: COMPLETED | OUTPATIENT
Start: 2023-11-30 | End: 2023-11-30

## 2023-11-30 RX ORDER — SODIUM CHLORIDE 9 MG/ML
INJECTION, SOLUTION INTRAVENOUS CONTINUOUS
Status: DISCONTINUED | OUTPATIENT
Start: 2023-11-30 | End: 2023-12-01 | Stop reason: HOSPADM

## 2023-11-30 RX ORDER — DEXAMETHASONE SODIUM PHOSPHATE 10 MG/ML
INJECTION INTRAMUSCULAR; INTRAVENOUS PRN
Status: DISCONTINUED | OUTPATIENT
Start: 2023-11-30 | End: 2023-11-30 | Stop reason: SDUPTHER

## 2023-11-30 RX ORDER — SODIUM CHLORIDE 9 MG/ML
INJECTION, SOLUTION INTRAVENOUS PRN
Status: DISCONTINUED | OUTPATIENT
Start: 2023-11-30 | End: 2023-12-01 | Stop reason: HOSPADM

## 2023-11-30 RX ORDER — DIPHENHYDRAMINE HCL 25 MG
25 CAPSULE ORAL EVERY 6 HOURS PRN
Status: DISCONTINUED | OUTPATIENT
Start: 2023-11-30 | End: 2023-12-01 | Stop reason: HOSPADM

## 2023-11-30 RX ORDER — SODIUM CHLORIDE, SODIUM LACTATE, POTASSIUM CHLORIDE, CALCIUM CHLORIDE 600; 310; 30; 20 MG/100ML; MG/100ML; MG/100ML; MG/100ML
INJECTION, SOLUTION INTRAVENOUS CONTINUOUS
Status: DISCONTINUED | OUTPATIENT
Start: 2023-11-30 | End: 2023-11-30 | Stop reason: HOSPADM

## 2023-11-30 RX ORDER — SODIUM CHLORIDE 0.9 % (FLUSH) 0.9 %
5-40 SYRINGE (ML) INJECTION PRN
Status: DISCONTINUED | OUTPATIENT
Start: 2023-11-30 | End: 2023-11-30 | Stop reason: HOSPADM

## 2023-11-30 RX ORDER — PROCHLORPERAZINE EDISYLATE 5 MG/ML
5 INJECTION INTRAMUSCULAR; INTRAVENOUS
Status: DISCONTINUED | OUTPATIENT
Start: 2023-11-30 | End: 2023-11-30 | Stop reason: HOSPADM

## 2023-11-30 RX ORDER — SODIUM CHLORIDE 0.9 % (FLUSH) 0.9 %
5-40 SYRINGE (ML) INJECTION EVERY 12 HOURS SCHEDULED
Status: DISCONTINUED | OUTPATIENT
Start: 2023-11-30 | End: 2023-12-01 | Stop reason: HOSPADM

## 2023-11-30 RX ORDER — HYDROMORPHONE HYDROCHLORIDE 2 MG/ML
0.25 INJECTION, SOLUTION INTRAMUSCULAR; INTRAVENOUS; SUBCUTANEOUS EVERY 5 MIN PRN
Status: COMPLETED | OUTPATIENT
Start: 2023-11-30 | End: 2023-11-30

## 2023-11-30 RX ORDER — OXYCODONE HYDROCHLORIDE 5 MG/1
5 TABLET ORAL EVERY 4 HOURS PRN
Status: DISCONTINUED | OUTPATIENT
Start: 2023-11-30 | End: 2023-12-01 | Stop reason: HOSPADM

## 2023-11-30 RX ORDER — LEVOTHYROXINE SODIUM 0.12 MG/1
125 TABLET ORAL
Status: DISCONTINUED | OUTPATIENT
Start: 2023-12-01 | End: 2023-12-01 | Stop reason: HOSPADM

## 2023-11-30 RX ORDER — HYDROMORPHONE HYDROCHLORIDE 1 MG/ML
0.5 INJECTION, SOLUTION INTRAMUSCULAR; INTRAVENOUS; SUBCUTANEOUS
Status: DISCONTINUED | OUTPATIENT
Start: 2023-11-30 | End: 2023-12-01 | Stop reason: HOSPADM

## 2023-11-30 RX ORDER — HYDROCODONE BITARTRATE AND ACETAMINOPHEN 5; 325 MG/1; MG/1
1 TABLET ORAL EVERY 6 HOURS PRN
Qty: 28 TABLET | Refills: 0 | Status: SHIPPED | OUTPATIENT
Start: 2023-11-30 | End: 2023-12-07

## 2023-11-30 RX ORDER — BISACODYL 5 MG/1
5 TABLET, DELAYED RELEASE ORAL DAILY
Status: DISCONTINUED | OUTPATIENT
Start: 2023-12-01 | End: 2023-12-01 | Stop reason: HOSPADM

## 2023-11-30 RX ORDER — SODIUM CHLORIDE 0.9 % (FLUSH) 0.9 %
5-40 SYRINGE (ML) INJECTION EVERY 12 HOURS SCHEDULED
Status: DISCONTINUED | OUTPATIENT
Start: 2023-11-30 | End: 2023-11-30 | Stop reason: HOSPADM

## 2023-11-30 RX ORDER — HYDROMORPHONE HYDROCHLORIDE 2 MG/ML
INJECTION, SOLUTION INTRAMUSCULAR; INTRAVENOUS; SUBCUTANEOUS PRN
Status: DISCONTINUED | OUTPATIENT
Start: 2023-11-30 | End: 2023-11-30 | Stop reason: SDUPTHER

## 2023-11-30 RX ORDER — LIDOCAINE HYDROCHLORIDE 10 MG/ML
1 INJECTION, SOLUTION INFILTRATION; PERINEURAL
Status: DISCONTINUED | OUTPATIENT
Start: 2023-11-30 | End: 2023-11-30 | Stop reason: HOSPADM

## 2023-11-30 RX ORDER — ONDANSETRON 2 MG/ML
4 INJECTION INTRAMUSCULAR; INTRAVENOUS
Status: DISCONTINUED | OUTPATIENT
Start: 2023-11-30 | End: 2023-11-30 | Stop reason: HOSPADM

## 2023-11-30 RX ORDER — TRANEXAMIC ACID 100 MG/ML
INJECTION, SOLUTION INTRAVENOUS PRN
Status: DISCONTINUED | OUTPATIENT
Start: 2023-11-30 | End: 2023-11-30 | Stop reason: SDUPTHER

## 2023-11-30 RX ORDER — SODIUM CHLORIDE 9 MG/ML
INJECTION, SOLUTION INTRAVENOUS PRN
Status: DISCONTINUED | OUTPATIENT
Start: 2023-11-30 | End: 2023-11-30 | Stop reason: HOSPADM

## 2023-11-30 RX ORDER — ONDANSETRON 2 MG/ML
4 INJECTION INTRAMUSCULAR; INTRAVENOUS EVERY 6 HOURS PRN
Status: DISCONTINUED | OUTPATIENT
Start: 2023-11-30 | End: 2023-12-01 | Stop reason: HOSPADM

## 2023-11-30 RX ORDER — CETIRIZINE HYDROCHLORIDE 10 MG/1
10 TABLET ORAL AS NEEDED
Status: DISCONTINUED | OUTPATIENT
Start: 2023-11-30 | End: 2023-12-01 | Stop reason: HOSPADM

## 2023-11-30 RX ORDER — SENNA AND DOCUSATE SODIUM 50; 8.6 MG/1; MG/1
1 TABLET, FILM COATED ORAL 2 TIMES DAILY
Status: DISCONTINUED | OUTPATIENT
Start: 2023-11-30 | End: 2023-12-01 | Stop reason: HOSPADM

## 2023-11-30 RX ORDER — ONDANSETRON 4 MG/1
4 TABLET, ORALLY DISINTEGRATING ORAL EVERY 8 HOURS PRN
Status: DISCONTINUED | OUTPATIENT
Start: 2023-11-30 | End: 2023-12-01 | Stop reason: HOSPADM

## 2023-11-30 RX ORDER — ONDANSETRON 2 MG/ML
INJECTION INTRAMUSCULAR; INTRAVENOUS PRN
Status: DISCONTINUED | OUTPATIENT
Start: 2023-11-30 | End: 2023-11-30 | Stop reason: SDUPTHER

## 2023-11-30 RX ORDER — SODIUM CHLORIDE 0.9 % (FLUSH) 0.9 %
5-40 SYRINGE (ML) INJECTION PRN
Status: DISCONTINUED | OUTPATIENT
Start: 2023-11-30 | End: 2023-12-01 | Stop reason: HOSPADM

## 2023-11-30 RX ORDER — DIPHENHYDRAMINE HYDROCHLORIDE 50 MG/ML
25 INJECTION INTRAMUSCULAR; INTRAVENOUS EVERY 6 HOURS PRN
Status: DISCONTINUED | OUTPATIENT
Start: 2023-11-30 | End: 2023-12-01 | Stop reason: HOSPADM

## 2023-11-30 RX ORDER — OXYCODONE HYDROCHLORIDE 5 MG/1
10 TABLET ORAL EVERY 4 HOURS PRN
Status: DISCONTINUED | OUTPATIENT
Start: 2023-11-30 | End: 2023-12-01 | Stop reason: HOSPADM

## 2023-11-30 RX ORDER — LIDOCAINE HYDROCHLORIDE 20 MG/ML
INJECTION, SOLUTION EPIDURAL; INFILTRATION; INTRACAUDAL; PERINEURAL PRN
Status: DISCONTINUED | OUTPATIENT
Start: 2023-11-30 | End: 2023-11-30 | Stop reason: SDUPTHER

## 2023-11-30 RX ORDER — GLYCOPYRROLATE 0.2 MG/ML
INJECTION INTRAMUSCULAR; INTRAVENOUS PRN
Status: DISCONTINUED | OUTPATIENT
Start: 2023-11-30 | End: 2023-11-30 | Stop reason: SDUPTHER

## 2023-11-30 RX ORDER — ATORVASTATIN CALCIUM 20 MG/1
20 TABLET, FILM COATED ORAL EVERY MORNING
Status: DISCONTINUED | OUTPATIENT
Start: 2023-12-01 | End: 2023-12-01 | Stop reason: HOSPADM

## 2023-11-30 RX ORDER — PROPOFOL 10 MG/ML
INJECTION, EMULSION INTRAVENOUS PRN
Status: DISCONTINUED | OUTPATIENT
Start: 2023-11-30 | End: 2023-11-30 | Stop reason: SDUPTHER

## 2023-11-30 RX ORDER — EPHEDRINE SULFATE/0.9% NACL/PF 50 MG/5 ML
SYRINGE (ML) INTRAVENOUS PRN
Status: DISCONTINUED | OUTPATIENT
Start: 2023-11-30 | End: 2023-11-30 | Stop reason: SDUPTHER

## 2023-11-30 RX ORDER — HYDROMORPHONE HYDROCHLORIDE 1 MG/ML
0.25 INJECTION, SOLUTION INTRAMUSCULAR; INTRAVENOUS; SUBCUTANEOUS
Status: DISCONTINUED | OUTPATIENT
Start: 2023-11-30 | End: 2023-12-01 | Stop reason: HOSPADM

## 2023-11-30 RX ORDER — TAMSULOSIN HYDROCHLORIDE 0.4 MG/1
0.4 CAPSULE ORAL DAILY
Status: DISCONTINUED | OUTPATIENT
Start: 2023-12-01 | End: 2023-12-01 | Stop reason: HOSPADM

## 2023-11-30 RX ORDER — ROCURONIUM BROMIDE 10 MG/ML
INJECTION, SOLUTION INTRAVENOUS PRN
Status: DISCONTINUED | OUTPATIENT
Start: 2023-11-30 | End: 2023-11-30 | Stop reason: SDUPTHER

## 2023-11-30 RX ORDER — ACETAMINOPHEN 500 MG
1000 TABLET ORAL ONCE
Status: COMPLETED | OUTPATIENT
Start: 2023-11-30 | End: 2023-11-30

## 2023-11-30 RX ORDER — DOCUSATE SODIUM 100 MG/1
100 CAPSULE, LIQUID FILLED ORAL 2 TIMES DAILY
Qty: 60 CAPSULE | Refills: 0 | Status: SHIPPED | OUTPATIENT
Start: 2023-11-30 | End: 2023-12-30

## 2023-11-30 RX ORDER — METHOCARBAMOL 500 MG/1
750 TABLET, FILM COATED ORAL EVERY 8 HOURS PRN
Status: DISCONTINUED | OUTPATIENT
Start: 2023-11-30 | End: 2023-12-01 | Stop reason: HOSPADM

## 2023-11-30 RX ORDER — HYDROMORPHONE HYDROCHLORIDE 2 MG/ML
0.25 INJECTION, SOLUTION INTRAMUSCULAR; INTRAVENOUS; SUBCUTANEOUS EVERY 5 MIN PRN
Status: DISCONTINUED | OUTPATIENT
Start: 2023-11-30 | End: 2023-11-30 | Stop reason: HOSPADM

## 2023-11-30 RX ORDER — NEOSTIGMINE METHYLSULFATE 1 MG/ML
INJECTION, SOLUTION INTRAVENOUS PRN
Status: DISCONTINUED | OUTPATIENT
Start: 2023-11-30 | End: 2023-11-30 | Stop reason: SDUPTHER

## 2023-11-30 RX ORDER — FAMOTIDINE 20 MG/1
20 TABLET, FILM COATED ORAL 2 TIMES DAILY
Status: DISCONTINUED | OUTPATIENT
Start: 2023-11-30 | End: 2023-12-01 | Stop reason: HOSPADM

## 2023-11-30 RX ORDER — ACETAMINOPHEN 325 MG/1
650 TABLET ORAL EVERY 6 HOURS
Status: DISCONTINUED | OUTPATIENT
Start: 2023-11-30 | End: 2023-12-01 | Stop reason: HOSPADM

## 2023-11-30 RX ADMIN — SODIUM CHLORIDE, SODIUM LACTATE, POTASSIUM CHLORIDE, AND CALCIUM CHLORIDE: 600; 310; 30; 20 INJECTION, SOLUTION INTRAVENOUS at 06:49

## 2023-11-30 RX ADMIN — HYDROMORPHONE HYDROCHLORIDE 0.25 MG: 2 INJECTION, SOLUTION INTRAMUSCULAR; INTRAVENOUS; SUBCUTANEOUS at 10:06

## 2023-11-30 RX ADMIN — Medication 10 MG: at 07:46

## 2023-11-30 RX ADMIN — DEXAMETHASONE SODIUM PHOSPHATE 8 MG: 10 INJECTION INTRAMUSCULAR; INTRAVENOUS at 07:48

## 2023-11-30 RX ADMIN — CEFAZOLIN SODIUM 2000 MG: 100 INJECTION, POWDER, LYOPHILIZED, FOR SOLUTION INTRAVENOUS at 23:38

## 2023-11-30 RX ADMIN — HYDROMORPHONE HYDROCHLORIDE 0.2 MG: 2 INJECTION INTRAMUSCULAR; INTRAVENOUS; SUBCUTANEOUS at 07:30

## 2023-11-30 RX ADMIN — Medication 10 MG: at 08:17

## 2023-11-30 RX ADMIN — TRANEXAMIC ACID 1000 MG: 100 INJECTION, SOLUTION INTRAVENOUS at 09:06

## 2023-11-30 RX ADMIN — FAMOTIDINE 20 MG: 20 TABLET ORAL at 20:17

## 2023-11-30 RX ADMIN — ACETAMINOPHEN 650 MG: 325 TABLET ORAL at 20:17

## 2023-11-30 RX ADMIN — PHENYLEPHRINE HYDROCHLORIDE 300 MCG: 0.1 INJECTION, SOLUTION INTRAVENOUS at 07:46

## 2023-11-30 RX ADMIN — PHENYLEPHRINE HYDROCHLORIDE 200 MCG: 10 INJECTION INTRAVENOUS at 08:04

## 2023-11-30 RX ADMIN — PHENYLEPHRINE HYDROCHLORIDE 200 MCG: 0.1 INJECTION, SOLUTION INTRAVENOUS at 07:56

## 2023-11-30 RX ADMIN — Medication 2000 MG: at 07:49

## 2023-11-30 RX ADMIN — PHENYLEPHRINE HYDROCHLORIDE 200 MCG: 0.1 INJECTION, SOLUTION INTRAVENOUS at 07:43

## 2023-11-30 RX ADMIN — HYDROMORPHONE HYDROCHLORIDE 0.25 MG: 2 INJECTION, SOLUTION INTRAMUSCULAR; INTRAVENOUS; SUBCUTANEOUS at 10:11

## 2023-11-30 RX ADMIN — ROCURONIUM BROMIDE 50 MG: 10 INJECTION, SOLUTION INTRAVENOUS at 07:36

## 2023-11-30 RX ADMIN — Medication 4 MG: at 09:07

## 2023-11-30 RX ADMIN — SODIUM CHLORIDE: 9 INJECTION, SOLUTION INTRAVENOUS at 16:08

## 2023-11-30 RX ADMIN — Medication 3 AMPULE: at 06:51

## 2023-11-30 RX ADMIN — DOCUSATE SODIUM 50 MG AND SENNOSIDES 8.6 MG 1 TABLET: 8.6; 5 TABLET, FILM COATED ORAL at 20:18

## 2023-11-30 RX ADMIN — PROPOFOL 30 MG: 10 INJECTION, EMULSION INTRAVENOUS at 08:38

## 2023-11-30 RX ADMIN — CEFAZOLIN SODIUM 2000 MG: 100 INJECTION, POWDER, LYOPHILIZED, FOR SOLUTION INTRAVENOUS at 16:09

## 2023-11-30 RX ADMIN — ONDANSETRON 4 MG: 2 INJECTION INTRAMUSCULAR; INTRAVENOUS at 07:48

## 2023-11-30 RX ADMIN — TRANEXAMIC ACID 1000 MG: 100 INJECTION, SOLUTION INTRAVENOUS at 07:56

## 2023-11-30 RX ADMIN — PHENYLEPHRINE HYDROCHLORIDE 200 MCG: 10 INJECTION INTRAVENOUS at 08:34

## 2023-11-30 RX ADMIN — HYDROMORPHONE HYDROCHLORIDE 0.2 MG: 2 INJECTION INTRAMUSCULAR; INTRAVENOUS; SUBCUTANEOUS at 09:02

## 2023-11-30 RX ADMIN — HYDROMORPHONE HYDROCHLORIDE 0.5 MG: 1 INJECTION, SOLUTION INTRAMUSCULAR; INTRAVENOUS; SUBCUTANEOUS at 20:29

## 2023-11-30 RX ADMIN — SODIUM CHLORIDE, PRESERVATIVE FREE 5 ML: 5 INJECTION INTRAVENOUS at 20:18

## 2023-11-30 RX ADMIN — HYDROMORPHONE HYDROCHLORIDE 0.25 MG: 2 INJECTION, SOLUTION INTRAMUSCULAR; INTRAVENOUS; SUBCUTANEOUS at 10:01

## 2023-11-30 RX ADMIN — ACETAMINOPHEN 650 MG: 325 TABLET ORAL at 16:09

## 2023-11-30 RX ADMIN — METOPROLOL TARTRATE 25 MG: 25 TABLET, FILM COATED ORAL at 20:18

## 2023-11-30 RX ADMIN — OXYCODONE 5 MG: 5 TABLET ORAL at 12:59

## 2023-11-30 RX ADMIN — HYDROMORPHONE HYDROCHLORIDE 0.2 MG: 2 INJECTION INTRAMUSCULAR; INTRAVENOUS; SUBCUTANEOUS at 07:28

## 2023-11-30 RX ADMIN — PHENYLEPHRINE HYDROCHLORIDE 50 MCG/MIN: 10 INJECTION INTRAVENOUS at 07:45

## 2023-11-30 RX ADMIN — SODIUM CHLORIDE, SODIUM LACTATE, POTASSIUM CHLORIDE, AND CALCIUM CHLORIDE: 600; 310; 30; 20 INJECTION, SOLUTION INTRAVENOUS at 09:15

## 2023-11-30 RX ADMIN — GLYCOPYRROLATE 0.4 MG: 0.2 INJECTION INTRAMUSCULAR; INTRAVENOUS at 09:07

## 2023-11-30 RX ADMIN — HYDROMORPHONE HYDROCHLORIDE 0.25 MG: 2 INJECTION, SOLUTION INTRAMUSCULAR; INTRAVENOUS; SUBCUTANEOUS at 10:15

## 2023-11-30 RX ADMIN — PROPOFOL 150 MG: 10 INJECTION, EMULSION INTRAVENOUS at 07:34

## 2023-11-30 RX ADMIN — ACETAMINOPHEN 1000 MG: 500 TABLET, FILM COATED ORAL at 06:50

## 2023-11-30 RX ADMIN — LIDOCAINE HYDROCHLORIDE 60 MG: 20 INJECTION, SOLUTION EPIDURAL; INFILTRATION; INTRACAUDAL; PERINEURAL at 07:34

## 2023-11-30 ASSESSMENT — PAIN SCALES - GENERAL
PAINLEVEL_OUTOF10: 7
PAINLEVEL_OUTOF10: 8
PAINLEVEL_OUTOF10: 0
PAINLEVEL_OUTOF10: 7
PAINLEVEL_OUTOF10: 7
PAINLEVEL_OUTOF10: 5
PAINLEVEL_OUTOF10: 7

## 2023-11-30 ASSESSMENT — PAIN - FUNCTIONAL ASSESSMENT
PAIN_FUNCTIONAL_ASSESSMENT: 0-10
PAIN_FUNCTIONAL_ASSESSMENT: PREVENTS OR INTERFERES SOME ACTIVE ACTIVITIES AND ADLS
PAIN_FUNCTIONAL_ASSESSMENT: 0-10

## 2023-11-30 ASSESSMENT — PAIN DESCRIPTION - ORIENTATION
ORIENTATION: LOWER
ORIENTATION: LOWER
ORIENTATION: LEFT;RIGHT

## 2023-11-30 ASSESSMENT — PAIN DESCRIPTION - LOCATION
LOCATION: LEG;HIP
LOCATION: BACK

## 2023-11-30 ASSESSMENT — PAIN DESCRIPTION - FREQUENCY: FREQUENCY: CONTINUOUS

## 2023-11-30 ASSESSMENT — PAIN DESCRIPTION - DESCRIPTORS: DESCRIPTORS: ACHING

## 2023-11-30 ASSESSMENT — PAIN DESCRIPTION - PAIN TYPE: TYPE: SURGICAL PAIN

## 2023-11-30 NOTE — PROGRESS NOTES
TRANSFER - IN REPORT:    Verbal report received from [facility] on [facility] [place] by [facility]  being received from PACU for routine progression of patient care      Report consisted of patient's Situation, Background, Assessment and   Recommendations(SBAR). Information from the following report(s) Nurse Handoff Report was reviewed with the receiving nurse. Opportunity for questions and clarification was provided. Assessment completed upon patient's arrival to unit and care assumed.

## 2023-11-30 NOTE — OP NOTE
[id number] [place]. [address]   [phone]    OPERATIVE REPORT    Patient ID:James Kenyon  [id number]  1945  77 y.o. DATE OF SURGERY: 11/30/2023    SURGEON: Emanuel López MD    PREOPERATIVE DIAGNOSIS: Lumbar radiculopathy    POSTOPERATIVE DIAGNOSIS: Lumbar radiculopathy    PROCEDURE:    1. Lumbar laminectomy L5 and S1 with bilateral foraminotomies and L5 and S1 root decompression. (CPT Q2518761, 42453 )     ANESTHESIA: General.    ESTIMATED BLOOD LOSS: 50 ml    POSTOPERATIVE CONDITION: Stable. INTRAOPERATIVE COMPLICATIONS: None. DESCRIPTION OF PROCEDURE: After adequate induction of general anesthesia, the patient was positioned prone on the [facility] spinal table. Care was taken to pad all bony prominences. The shoulders and elbows were placed in the 90/90 position. The abdomen was allowed to hang free to decrease intraabdominal and venous pressure. The lumbar area was prepped and draped in the usual sterile fashion. Preoperative antibiotic was administered. A time out was called to confirm the appropriate patient, proposed procedure and proposed incision sites. With this conformation, an incision was created midline, over the lumbar spinous processes. Dissection was carried down to the lumbodorsal fascia. The lumbodorsal fascia and paraspinous musculature were elevated in a subperiosteal fashion, laterally off of the spinous processes and lamina. A penfield 4 elevator was placed under the lamina and a lateral fluoroscopy was used to obtain localization of the correct level. The spinous process was then marked with the Leksell rongeur. The pars interarticularis was exposed at each level. Care was taken to preserve the facet capsule at each level. The deep retractors were placed to facilitate visualization. A Leksell rongeur was used to resect the spinous processes of  L5. A 4 mm Kerrison was used to perform a central laminectomy of  L5 and S1.  The

## 2023-11-30 NOTE — DISCHARGE INSTRUCTIONS
Discharge Instructions - Spine Surgery    Wound Care and Showering  Your wound will typically be covered with a clear mesh and glue, which is waterproof sufficient for showering but not soaking in a bath. If there is some drainage or bleeding from under the glue, the area should be covered with gauze and secured with tape or Tegaderm (purchased at a pharmacy) until the drainage stops. Tegaderm is preferable since it is waterproof and can be worn in the shower. Once the drainage stops, the outer gauze and Tegaderm dressing can be removed, while leaving the glue layer in place for 10-14 days. Hair washing is permissible while in the shower. No tub baths, hot tubs or whirlpools until seen in the office. Once the wound is healed, the glue can be removed by dissolving with a triple-antibiotic or we can remove it at your first post operative visit. If any of the following should occur, please call the office:  Fevers greater than 101 degrees that does not improve after tylenol use. Increased redness or large amount of swelling around incision    Exercise  Walking and stair climbing privileges are unlimited including walking on a treadmill without an incline. Do NOT lift  greater than 15 lbs until otherwise instructed. Avoid lifting or reaching above your head. Sleeping  You may sleep in any comfortable position. Many patients find comfort sleeping in a recliner chair. It is normal to have difficulty sleeping for the first several weeks following your surgery. We recommend trying Benadryl, Melatonin, or Tylenol PM for help sleeping. All are over-the-counter and can be found in drugstores. Eating  Because of the tubes in your throat while asleep during surgery, it is normal to have a sore throat and some difficulty swallowing solid foods after your surgery. This may persist for several weeks. Eating soft foods like yogurt, macaroni and mashed potatoes seem to help.     Pain  If you feel you need pain

## 2023-11-30 NOTE — ANESTHESIA POSTPROCEDURE EVALUATION
Department of Anesthesiology  Postprocedure Note    Patient: Karla Love  MRN: [id number]  YOB: 1945  Date of evaluation: 11/30/2023      Procedure Summary     Date: 11/30/23 Room / Location: [facility] MAIN OR  / [facility] MAIN OR    Anesthesia Start: 0726 Anesthesia Stop: 2342    Procedure: L5-S1 laminectomy (Spine Lumbar) Diagnosis:       Spinal stenosis of lumbar region with neurogenic claudication      Left leg pain      (Spinal stenosis of lumbar region with neurogenic claudication [M48.062])      (Left leg pain [M79.605])    Providers: Jazmyne Dawson MD Responsible Provider: Teto Horner MD    Anesthesia Type: General ASA Status: 3          Anesthesia Type: General    Stephen Phase I: Stephen Score: 9    Stephen Phase II:        Anesthesia Post Evaluation    Patient location during evaluation: PACU  Patient participation: complete - patient participated  Level of consciousness: awake  Airway patency: patent  Nausea & Vomiting: no nausea and no vomiting  Complications: no  Cardiovascular status: blood pressure returned to baseline  Respiratory status: acceptable  Hydration status: euvolemic  Pain management: adequate

## 2023-12-01 VITALS
DIASTOLIC BLOOD PRESSURE: 74 MMHG | RESPIRATION RATE: 16 BRPM | WEIGHT: 170 LBS | HEART RATE: 79 BPM | TEMPERATURE: 97.9 F | SYSTOLIC BLOOD PRESSURE: 128 MMHG | OXYGEN SATURATION: 97 % | BODY MASS INDEX: 23.8 KG/M2 | HEIGHT: 71 IN

## 2023-12-01 LAB
ANION GAP SERPL CALC-SCNC: 8 MMOL/L (ref 2–11)
BUN SERPL-MCNC: 14 MG/DL (ref 8–23)
CALCIUM SERPL-MCNC: 9.5 MG/DL (ref 8.3–10.4)
CHLORIDE SERPL-SCNC: 110 MMOL/L (ref 101–110)
CO2 SERPL-SCNC: 23 MMOL/L (ref 21–32)
CREAT SERPL-MCNC: 0.8 MG/DL (ref 0.8–1.5)
ERYTHROCYTE [DISTWIDTH] IN BLOOD BY AUTOMATED COUNT: 12.6 % (ref 11.9–14.6)
GLUCOSE SERPL-MCNC: 118 MG/DL (ref 65–100)
HCT VFR BLD AUTO: 37.3 % (ref 41.1–50.3)
HGB BLD-MCNC: 12.4 G/DL (ref 13.6–17.2)
MCH RBC QN AUTO: 33.9 PG (ref 26.1–32.9)
MCHC RBC AUTO-ENTMCNC: 33.2 G/DL (ref 31.4–35)
MCV RBC AUTO: 101.9 FL (ref 82–102)
NRBC # BLD: 0 K/UL (ref 0–0.2)
PLATELET # BLD AUTO: 268 K/UL (ref 150–450)
PMV BLD AUTO: 9.1 FL (ref 9.4–12.3)
POTASSIUM SERPL-SCNC: 4 MMOL/L (ref 3.5–5.1)
RBC # BLD AUTO: 3.66 M/UL (ref 4.23–5.6)
SODIUM SERPL-SCNC: 141 MMOL/L (ref 133–143)
WBC # BLD AUTO: 11.9 K/UL (ref 4.3–11.1)

## 2023-12-01 PROCEDURE — 97165 OT EVAL LOW COMPLEX 30 MIN: CPT

## 2023-12-01 PROCEDURE — 2580000003 HC RX 258: Performed by: ORTHOPAEDIC SURGERY

## 2023-12-01 PROCEDURE — 6370000000 HC RX 637 (ALT 250 FOR IP): Performed by: ORTHOPAEDIC SURGERY

## 2023-12-01 PROCEDURE — 97535 SELF CARE MNGMENT TRAINING: CPT

## 2023-12-01 PROCEDURE — 85027 COMPLETE CBC AUTOMATED: CPT

## 2023-12-01 PROCEDURE — 97530 THERAPEUTIC ACTIVITIES: CPT

## 2023-12-01 PROCEDURE — 80048 BASIC METABOLIC PNL TOTAL CA: CPT

## 2023-12-01 PROCEDURE — 36415 COLL VENOUS BLD VENIPUNCTURE: CPT

## 2023-12-01 RX ADMIN — ATORVASTATIN CALCIUM 20 MG: 20 TABLET, FILM COATED ORAL at 08:44

## 2023-12-01 RX ADMIN — LEVOTHYROXINE SODIUM 125 MCG: 0.12 TABLET ORAL at 05:08

## 2023-12-01 RX ADMIN — METOPROLOL TARTRATE 25 MG: 25 TABLET, FILM COATED ORAL at 08:44

## 2023-12-01 RX ADMIN — DOCUSATE SODIUM 50 MG AND SENNOSIDES 8.6 MG 1 TABLET: 8.6; 5 TABLET, FILM COATED ORAL at 08:43

## 2023-12-01 RX ADMIN — BISACODYL 5 MG: 5 TABLET, COATED ORAL at 08:44

## 2023-12-01 RX ADMIN — ACETAMINOPHEN 650 MG: 325 TABLET ORAL at 08:44

## 2023-12-01 RX ADMIN — TAMSULOSIN HYDROCHLORIDE 0.4 MG: 0.4 CAPSULE ORAL at 08:43

## 2023-12-01 RX ADMIN — SODIUM CHLORIDE: 9 INJECTION, SOLUTION INTRAVENOUS at 02:18

## 2023-12-01 RX ADMIN — FAMOTIDINE 20 MG: 20 TABLET ORAL at 08:44

## 2023-12-01 RX ADMIN — ACETAMINOPHEN 650 MG: 325 TABLET ORAL at 02:12

## 2023-12-01 RX ADMIN — FINASTERIDE 5 MG: 5 TABLET, FILM COATED ORAL at 08:43

## 2023-12-01 NOTE — CARE COORDINATION
Pt chart reviewed for discharge planning. CM met with pt at bedside, verified demographic information/health insurance. Pt lives with spouse and normally manages his ADL's. PCP was confirmed as MELODY GALICIA JR. [facility] but has not seen this year due to close folow up care at the [facility]. Pt denies any concerns at this time. Pt is for discharge home today with family and no needs/supportive care orders recieved for CM at this time. Pt will resume close follow up care visits at the [facility]. 12/01/23 1319   Service Assessment   Patient Orientation Alert and Oriented   Cognition Alert   History Provided By Patient;Spouse;Medical Record   Primary Caregiver Self   Accompanied By/Relationship spouse   Support Systems Family Members;Spouse/Significant Other   Patient's Healthcare Decision Maker is: Legal Next of Kin   PCP Verified by CM Yes   Last Visit to PCP Within last 3 months   Prior Functional Level Independent in ADLs/IADLs   Current Functional Level Independent in ADLs/IADLs   Can patient return to prior living arrangement Yes   Ability to make needs known: Good   Family able to assist with home care needs: Yes   Would you like for me to discuss the discharge plan with any other family members/significant others, and if so, who? No   Financial Resources Medicare  ([employer])   Community Resources None   Social/Functional History   Type of [address] [place] [address] Dr One level   Home Access Stairs to enter without rails   Entrance Stairs - Number of Steps 2   Bathroom Shower/Tub Walk-in shower   [address] chair   ADL Assistance Independent   Discharge Planning   Type of [address] [place] [address] Prior To Admission None   Potential Assistance Needed N/A   DME Ordered?  No   Potential Assistance Purchasing Medications No   Type of Home Care Services None   Patient expects to be discharged to: [facility] Discharge Transition of Care Consult (CM Consult) Discharge [facility] None    Resource Information Provided? No   Mode of Transport at Discharge Other (see comment)  (spouse)   Confirm Follow Up Transport Self   Condition of Participation: Discharge Planning   The Plan for Transition of Care is related to the following treatment goals: Pt will return home with spouse at his functionla baseline. The Patient and/Or Patient Representative agree with the Discharge Plan?  Yes

## 2023-12-01 NOTE — PROGRESS NOTES
Chart review:    Dr. Eugenia Wray was contacted by the floor nursing. Patient is ready to discharge home. I was about to leave to go evaluate patient but if he is doing so well Dr. Eugenia Wray is okay with him discharging home. Will discontinue the drain.

## 2023-12-01 NOTE — ACP (ADVANCE CARE PLANNING)
Advance Care Planning   Healthcare Decision Maker:    Primary Decision Maker: ramon arce - [facility] - [phone]    Pt is full code. Click here to complete Healthcare Decision Makers including selection of the Healthcare Decision Maker Relationship (ie \"Primary\").

## 2023-12-01 NOTE — DISCHARGE SUMMARY
Discharge Summary    Patient ID:James Dennis  [id number]  1945  77 y.o. Admit date: 11/30/2023    Discharge date: 12/1/2023    Admitting Physician: Jazmyne Dawson MD    Discharge Physician: Jazmyne Dawson MD    Admission Diagnoses:     ICD-10-CM    1. Lumbar radiculopathy  M54.16            Discharge Diagnoses: There are no discharge diagnoses documented for the most recent discharge. Surgeon: Jazmyne Dawson MD    Procedure:   1. Lumbar laminectomy L5 and S1 with bilateral foraminotomies and L5 and S1 root decompression. (CPT T226150, [id number] )      INDICATIONS FOR ADMISSION/PROCEDURE: Back and leg pain consistent with claudication/lumbar radiculitis that is no longer responsive to conservative measures. Walking and standing tolerances have diminished. Imaging studies are concordant, showing lumbar stenosis. In the outpatient setting, the risks, benefits, and potential complications of the above listed procedure were discussed and an informed consent was obtained. Hospital Course: Patient admitted to ortho floor. Patient remained afebrile throughout hospital course. Tolerated pain medications and po diet. Dressing remained clean, dry, and intact. Patient voided normally postoperatively. Physical Therapy started on the day following surgery and progressed to ambulation without assistance. Reports improvement of preoperative pain. At the time of discharge, had understanding of precautions needed following surgery. Postoperative complications requiring an extended length of stay: None    Discharged to: Home    New medications:      Medication List        START taking these medications      docusate sodium 100 MG capsule  Commonly known as: COLACE  Take 1 capsule by mouth 2 times daily     HYDROcodone-acetaminophen 5-325 MG per tablet  Commonly known as: Norco  Take 1 tablet by mouth every 6 hours as needed for Pain for up to 7 days. Intended supply: 74 days.  Take lowest dose possible

## 2023-12-01 NOTE — PROGRESS NOTES
discussed with the patient.)  Self Care Training  Therapeutic Activity  Therapeutic Exercise/HEP  Neuromuscular Re-education  Manual Therapy  Education         TREATMENT:     EVALUATION: LOW COMPLEXITY: (Untimed Charge)    TREATMENT:   Self Care (10 minutes): Patient participated in upper body dressing and lower body dressing ADLs in supported sitting and standing with moderate verbal cueing to increase independence and maintain precautions. Patient also participated in functional mobility, functional transfer, and adaptive equipment training to increase independence and maintain precautions.      TREATMENT GRID:  N/A    AFTER TREATMENT PRECAUTIONS: Call light within reach, Chair, RN notified, and Visitors at bedside    INTERDISCIPLINARY COLLABORATION:  RN/ PCT and PT/ PTA    EDUCATION:  Education Given To: Patient  Education Provided: Role of Therapy;Plan of Care;ADL Adaptive Strategies;Precautions    TOTAL TREATMENT DURATION AND TIME:  Time In: 1117  Time Out: [address]  Minutes: [address] Cheung, OT

## 2023-12-01 NOTE — PROGRESS NOTES
ACUTE PHYSICAL THERAPY GOALS:   (Developed with and agreed upon by patient and/or caregiver.)  Pt will perform bed mobility Mod (I) in 7 therapy sessions. Pt will perform sit-to-stand/ stand-to-sit transfers Mod (I) without LOB or miss-steps in 7 therapy sessions. Pt will ambulate 250 ft Mod (I) with use of LRAD, no LOB or miss-steps and breaks as needed in 7 therapy sessions. Pt will perform standing dynamic balance activities with minimal postural sway in 7 therapy sessions. Pt will tolerate multiple sets and reps of BLE exercises in 7 therapy sessions. PHYSICAL THERAPY: Daily Note AM   (Link to Caseload Tracking: PT Visit Days : 2  Time In/Out PT Charge Capture  Rehab Caseload Tracker  Orders    Andres Gurrola is a 77 y.o. male   PRIMARY DIAGNOSIS: Lumbar radiculopathy  Spinal stenosis of lumbar region with neurogenic claudication [M48.062]  Left leg pain [M79.605]  Lumbar radiculopathy [M54.16]  Procedure(s) (LRB):  L5-S1 laminectomy (N/A)  1 Day Post-Op  Outpatient in a bed: Payor: Kerri Guard / Plan: Louie Rg PPO / Product Type: Medicare /     ASSESSMENT:     REHAB RECOMMENDATIONS:   Recommendation to date pending progress:  Setting:  Home Health Therapy    Equipment:    None     ASSESSMENT:  Mr. Randal Alan demonstrated excellent progress toward goals. He is in the chair upon contact and endorses good understanding of log roll technique for bed mobility. He performed transfers with IND and then ambulated in the hallway using the RW. He is steady with good posture and no loss of balance. The patient states he feels ready to go home and demonstrated  good technique throughout treatment. SUBJECTIVE:   Mr. Randal Alan states, \"I'm just waiting on the doctor. \"     Social/Functional    OBJECTIVE:     PAIN: VITALS / O2: PRECAUTION / Lesli Belfast / DRAINS:   Pre Treatment:    0      Post Treatment: 0 Vitals        Oxygen    GERRY Drain    RESTRICTIONS/PRECAUTIONS:  Position Activity Restriction  Spinal Precautions: No Bending, No Lifting, No Twisting     MOBILITY: I Mod I S SBA CGA Min Mod Max Total  NT x2 Comments:   Bed Mobility    Rolling [] [] [] [] [] [] [] [] [] [x] []    Supine to Sit [] [] [] [] [] [] [] [] [] [x] []    Scooting [] [] [] [] [] [] [] [] [] [x] []    Sit to Supine [] [] [] [] [] [] [] [] [] [x] []    Transfers    Sit to Stand [x] [x] [] [] [] [] [] [] [] [] []    Bed to Chair [] [] [] [] [] [] [] [] [] [x] []    Stand to Sit [x] [] [] [] [] [] [] [] [] [] []     [] [] [] [] [] [] [] [] [] [] []    I=Independent, Mod I=Modified Independent, S=Supervision, SBA=Standby Assistance, CGA=Contact Guard Assistance,   Min=Minimal Assistance, Mod=Moderate Assistance, Max=Maximal Assistance, Total=Total Assistance, NT=Not Tested    BALANCE: Good Fair+ Fair Fair- Poor NT Comments   Sitting Static [x] [] [] [] [] []    Sitting Dynamic [x] [] [] [] [] []              Standing Static [x] [] [] [] [] []    Standing Dynamic [x] [] [] [] [] []      GAIT: I Mod I S SBA CGA Min Mod Max Total  NT x2 Comments:   Level of Assistance [] [x] [x] [] [] [] [] [] [] [] []    Distance 500  feet    DME Rolling Walker    Gait Quality WNL    Weightbearing Status      Stairs      I=Independent, Mod I=Modified Independent, S=Supervision, SBA=Standby Assistance, CGA=Contact Guard Assistance,   Min=Minimal Assistance, Mod=Moderate Assistance, Max=Maximal Assistance, Total=Total Assistance, NT=Not Tested    PLAN:   FREQUENCY AND DURATION: BID for duration of hospital stay or until stated goals are met, whichever comes first.    TREATMENT:   TREATMENT:   Therapeutic Activity (11 Minutes): Therapeutic activity included Scooting, Transfer Training, Ambulation on level ground, Sitting balance , and Standing balance to improve functional Activity tolerance, Balance, Mobility, and Strength.     TREATMENT GRID:  N/A    AFTER TREATMENT PRECAUTIONS: Call light within reach, Chair, and Visitors at

## 2023-12-05 ENCOUNTER — TELEPHONE (OUTPATIENT)
Dept: ORTHOPEDIC SURGERY | Age: 78
End: 2023-12-05

## 2023-12-05 NOTE — TELEPHONE ENCOUNTER
She is asking for a call. She needs some post op instructions on what to do with the bandage on his back.

## 2023-12-05 NOTE — TELEPHONE ENCOUNTER
Returned patients call he is aware not to remove the clear mesh it will be removed at post op visit. Patient and spouse verbalized understanding.

## 2023-12-13 ENCOUNTER — OFFICE VISIT (OUTPATIENT)
Dept: ORTHOPEDIC SURGERY | Age: 78
End: 2023-12-13

## 2023-12-13 DIAGNOSIS — Z98.890 S/P LAMINECTOMY: Primary | ICD-10-CM

## 2023-12-13 DIAGNOSIS — C43.71 MELANOMA OF RIGHT POSTERIOR CALF (HCC): Primary | ICD-10-CM

## 2023-12-13 DIAGNOSIS — R68.89 OTHER GENERAL SYMPTOMS AND SIGNS: ICD-10-CM

## 2023-12-13 PROCEDURE — 99024 POSTOP FOLLOW-UP VISIT: CPT | Performed by: ORTHOPAEDIC SURGERY

## 2023-12-13 RX ORDER — HYDROCODONE BITARTRATE AND ACETAMINOPHEN 5; 325 MG/1; MG/1
1 TABLET ORAL EVERY 6 HOURS PRN
Qty: 28 TABLET | Refills: 0 | Status: SHIPPED | OUTPATIENT
Start: 2023-12-13 | End: 2023-12-20

## 2023-12-13 NOTE — PROGRESS NOTES
Name: Lorin Majano  YOB: 1945  Gender: male  MRN: [id number]  Age: 77 y.o. Chief Complaint: Postop follow-up    History of Present Illness: This is a very pleasant 77 y.o. male who presents for his 2-week follow-up after L5-S1 laminectomy. He is doing well overall. His left leg pain is significantly improved. He still has some numbness in his left leg but overall he is happy. He no longer uses an assistive device for ambulation. Medications:       Current Outpatient Medications:     HYDROcodone-acetaminophen (NORCO) 5-325 MG per tablet, Take 1 tablet by mouth every 6 hours as needed for Pain for up to 7 days. Max Daily Amount: 4 tablets, Disp: 28 tablet, Rfl: 0    docusate sodium (COLACE) 100 MG capsule, Take 1 capsule by mouth 2 times daily, Disp: 60 capsule, Rfl: 0    levothyroxine (SYNTHROID) 125 MCG tablet, Take 1 tablet by mouth daily, Disp: 90 tablet, Rfl: 1    tamsulosin (FLOMAX) 0.4 MG capsule, 1 capsule daily, Disp: , Rfl:     apixaban (ELIQUIS) 5 MG TABS tablet, Take 1 tablet by mouth 2 times daily Hold 48 hours prior to surgery per medication hold clearance / [facility], Disp: , Rfl:     atorvastatin (LIPITOR) 20 MG tablet, Take 1 tablet by mouth every morning, Disp: , Rfl:     cetirizine (ZYRTEC) 10 MG tablet, Take 1 tablet by mouth as needed, Disp: , Rfl:     metoprolol tartrate (LOPRESSOR) 25 MG tablet, Take 1 tablet by mouth 2 times daily, Disp: , Rfl:     Multiple Vitamin (MULTIVITAMINS PO), Take 1 tablet by mouth daily, Disp: , Rfl:     Allergies: Allergies   Allergen Reactions    Lisinopril Cough         Physical Exam:     This is a well developed well nourished male adult in no acute distress. Mood and affect are appropriate. Oriented to person, place, and time.     Respirations are unlabored and there is no evidence of cyanosis    Incision well-healed    Sensory testing:     L2 L3 L4 L5 S1 S2   Right 2 2 2 2 2 2   Left 2 2 2 2 2 2

## 2023-12-14 ENCOUNTER — OFFICE VISIT (OUTPATIENT)
Dept: ONCOLOGY | Age: 78
End: 2023-12-14
Payer: MEDICARE

## 2023-12-14 ENCOUNTER — HOSPITAL ENCOUNTER (OUTPATIENT)
Dept: INFUSION THERAPY | Age: 78
Discharge: HOME | End: 2023-12-14
Payer: MEDICARE

## 2023-12-14 ENCOUNTER — HOSPITAL ENCOUNTER (OUTPATIENT)
Dept: LAB | Age: 78
Discharge: HOME | End: 2023-12-14
Payer: MEDICARE

## 2023-12-14 VITALS
HEART RATE: 50 BPM | OXYGEN SATURATION: 97 % | DIASTOLIC BLOOD PRESSURE: 65 MMHG | SYSTOLIC BLOOD PRESSURE: 112 MMHG | BODY MASS INDEX: 26.36 KG/M2 | TEMPERATURE: 97.6 F | HEIGHT: 69 IN | WEIGHT: 178 LBS | RESPIRATION RATE: 18 BRPM

## 2023-12-14 DIAGNOSIS — I48.20 ATRIAL FIBRILLATION, CHRONIC (HCC): ICD-10-CM

## 2023-12-14 DIAGNOSIS — Z76.89 ESTABLISHING CARE WITH NEW DOCTOR, ENCOUNTER FOR: ICD-10-CM

## 2023-12-14 DIAGNOSIS — E78.5 HYPERLIPIDEMIA, UNSPECIFIED HYPERLIPIDEMIA TYPE: ICD-10-CM

## 2023-12-14 DIAGNOSIS — C43.71 MELANOMA OF RIGHT POSTERIOR CALF (HCC): Primary | ICD-10-CM

## 2023-12-14 DIAGNOSIS — C43.71 MELANOMA OF RIGHT POSTERIOR CALF (HCC): ICD-10-CM

## 2023-12-14 DIAGNOSIS — Z79.899 HIGH RISK MEDICATION USE: ICD-10-CM

## 2023-12-14 DIAGNOSIS — R68.89 OTHER GENERAL SYMPTOMS AND SIGNS: ICD-10-CM

## 2023-12-14 DIAGNOSIS — Z51.11 ENCOUNTER FOR ANTINEOPLASTIC CHEMOTHERAPY: ICD-10-CM

## 2023-12-14 DIAGNOSIS — E03.2 HYPOTHYROIDISM DUE TO MEDICATION: ICD-10-CM

## 2023-12-14 LAB
ALBUMIN SERPL-MCNC: 3.6 G/DL (ref 3.2–4.6)
ALBUMIN/GLOB SERPL: 1 (ref 0.4–1.6)
ALP SERPL-CCNC: 94 U/L (ref 50–136)
ALT SERPL-CCNC: 35 U/L (ref 12–65)
ANION GAP SERPL CALC-SCNC: 8 MMOL/L (ref 2–11)
AST SERPL-CCNC: 21 U/L (ref 15–37)
BASOPHILS # BLD: 0.1 K/UL (ref 0–0.2)
BASOPHILS NFR BLD: 1 % (ref 0–2)
BILIRUB SERPL-MCNC: 0.4 MG/DL (ref 0.2–1.1)
BUN SERPL-MCNC: 11 MG/DL (ref 8–23)
CALCIUM SERPL-MCNC: 9.3 MG/DL (ref 8.3–10.4)
CHLORIDE SERPL-SCNC: 106 MMOL/L (ref 103–113)
CO2 SERPL-SCNC: 27 MMOL/L (ref 21–32)
CREAT SERPL-MCNC: 1 MG/DL (ref 0.8–1.5)
DIFFERENTIAL METHOD BLD: ABNORMAL
EOSINOPHIL # BLD: 0.2 K/UL (ref 0–0.8)
EOSINOPHIL NFR BLD: 3 % (ref 0.5–7.8)
ERYTHROCYTE [DISTWIDTH] IN BLOOD BY AUTOMATED COUNT: 13 % (ref 11.9–14.6)
GLOBULIN SER CALC-MCNC: 3.6 G/DL (ref 2.8–4.5)
GLUCOSE SERPL-MCNC: 205 MG/DL (ref 65–100)
HCT VFR BLD AUTO: 39.2 % (ref 41.1–50.3)
HGB BLD-MCNC: 13.1 G/DL (ref 13.6–17.2)
IMM GRANULOCYTES # BLD AUTO: 0 K/UL (ref 0–0.5)
IMM GRANULOCYTES NFR BLD AUTO: 1 % (ref 0–5)
LYMPHOCYTES # BLD: 0.9 K/UL (ref 0.5–4.6)
LYMPHOCYTES NFR BLD: 13 % (ref 13–44)
MCH RBC QN AUTO: 33.7 PG (ref 26.1–32.9)
MCHC RBC AUTO-ENTMCNC: 33.4 G/DL (ref 31.4–35)
MCV RBC AUTO: 100.8 FL (ref 82–102)
MONOCYTES # BLD: 0.4 K/UL (ref 0.1–1.3)
MONOCYTES NFR BLD: 6 % (ref 4–12)
NEUTS SEG # BLD: 5 K/UL (ref 1.7–8.2)
NEUTS SEG NFR BLD: 76 % (ref 43–78)
NRBC # BLD: 0 K/UL (ref 0–0.2)
PLATELET # BLD AUTO: 296 K/UL (ref 150–450)
PMV BLD AUTO: 8.9 FL (ref 9.4–12.3)
POTASSIUM SERPL-SCNC: 3.6 MMOL/L (ref 3.5–5.1)
PROT SERPL-MCNC: 7.2 G/DL (ref 6.3–8.2)
RBC # BLD AUTO: 3.89 M/UL (ref 4.23–5.6)
SODIUM SERPL-SCNC: 141 MMOL/L (ref 136–146)
TSH, 3RD GENERATION: 3.54 UIU/ML (ref 0.36–3)
WBC # BLD AUTO: 6.6 K/UL (ref 4.3–11.1)

## 2023-12-14 PROCEDURE — 1123F ACP DISCUSS/DSCN MKR DOCD: CPT | Performed by: NURSE PRACTITIONER

## 2023-12-14 PROCEDURE — 2580000003 HC RX 258: Performed by: INTERNAL MEDICINE

## 2023-12-14 PROCEDURE — 85025 COMPLETE CBC W/AUTO DIFF WBC: CPT

## 2023-12-14 PROCEDURE — 80053 COMPREHEN METABOLIC PANEL: CPT

## 2023-12-14 PROCEDURE — 99214 OFFICE O/P EST MOD 30 MIN: CPT | Performed by: NURSE PRACTITIONER

## 2023-12-14 PROCEDURE — 6360000002 HC RX W HCPCS: Performed by: INTERNAL MEDICINE

## 2023-12-14 PROCEDURE — 96413 CHEMO IV INFUSION 1 HR: CPT

## 2023-12-14 PROCEDURE — 84443 ASSAY THYROID STIM HORMONE: CPT

## 2023-12-14 RX ORDER — MEPERIDINE HYDROCHLORIDE 25 MG/ML
12.5 INJECTION INTRAMUSCULAR; INTRAVENOUS; SUBCUTANEOUS PRN
Status: DISCONTINUED | OUTPATIENT
Start: 2023-12-14 | End: 2023-12-15 | Stop reason: HOSPADM

## 2023-12-14 RX ORDER — ONDANSETRON 2 MG/ML
8 INJECTION INTRAMUSCULAR; INTRAVENOUS
Status: DISCONTINUED | OUTPATIENT
Start: 2023-12-14 | End: 2023-12-15 | Stop reason: HOSPADM

## 2023-12-14 RX ORDER — DIPHENHYDRAMINE HYDROCHLORIDE 50 MG/ML
50 INJECTION INTRAMUSCULAR; INTRAVENOUS
Status: DISCONTINUED | OUTPATIENT
Start: 2023-12-14 | End: 2023-12-15 | Stop reason: HOSPADM

## 2023-12-14 RX ORDER — SODIUM CHLORIDE 0.9 % (FLUSH) 0.9 %
5-40 SYRINGE (ML) INJECTION PRN
Status: DISCONTINUED | OUTPATIENT
Start: 2023-12-14 | End: 2023-12-15 | Stop reason: HOSPADM

## 2023-12-14 RX ORDER — EPINEPHRINE 1 MG/ML
0.3 INJECTION, SOLUTION, CONCENTRATE INTRAVENOUS PRN
Status: DISCONTINUED | OUTPATIENT
Start: 2023-12-14 | End: 2023-12-15 | Stop reason: HOSPADM

## 2023-12-14 RX ORDER — ACETAMINOPHEN 325 MG/1
650 TABLET ORAL
Status: DISCONTINUED | OUTPATIENT
Start: 2023-12-14 | End: 2023-12-15 | Stop reason: HOSPADM

## 2023-12-14 RX ORDER — ATORVASTATIN CALCIUM 20 MG/1
20 TABLET, FILM COATED ORAL EVERY MORNING
Qty: 30 TABLET | Refills: 1 | Status: SHIPPED | OUTPATIENT
Start: 2023-12-14

## 2023-12-14 RX ORDER — ALBUTEROL SULFATE 90 UG/1
4 AEROSOL, METERED RESPIRATORY (INHALATION) PRN
Status: DISCONTINUED | OUTPATIENT
Start: 2023-12-14 | End: 2023-12-15 | Stop reason: HOSPADM

## 2023-12-14 RX ORDER — SODIUM CHLORIDE 0.9 % (FLUSH) 0.9 %
5-40 SYRINGE (ML) INJECTION PRN
Status: ACTIVE | OUTPATIENT
Start: 2023-12-14

## 2023-12-14 RX ORDER — SODIUM CHLORIDE 9 MG/ML
5-250 INJECTION, SOLUTION INTRAVENOUS PRN
Status: DISCONTINUED | OUTPATIENT
Start: 2023-12-14 | End: 2023-12-15 | Stop reason: HOSPADM

## 2023-12-14 RX ADMIN — SODIUM CHLORIDE 400 MG: 9 INJECTION, SOLUTION INTRAVENOUS at 15:18

## 2023-12-14 RX ADMIN — SODIUM CHLORIDE 50 ML/HR: 9 INJECTION, SOLUTION INTRAVENOUS at 15:05

## 2023-12-14 RX ADMIN — SODIUM CHLORIDE, PRESERVATIVE FREE 10 ML: 5 INJECTION INTRAVENOUS at 15:04

## 2023-12-14 RX ADMIN — SODIUM CHLORIDE, PRESERVATIVE FREE 10 ML: 5 INJECTION INTRAVENOUS at 13:36

## 2023-12-14 ASSESSMENT — PATIENT HEALTH QUESTIONNAIRE - PHQ9
SUM OF ALL RESPONSES TO PHQ QUESTIONS 1-9: 0
SUM OF ALL RESPONSES TO PHQ QUESTIONS 1-9: 0
1. LITTLE INTEREST OR PLEASURE IN DOING THINGS: 0
SUM OF ALL RESPONSES TO PHQ QUESTIONS 1-9: 0
SUM OF ALL RESPONSES TO PHQ9 QUESTIONS 1 & 2: 0
SUM OF ALL RESPONSES TO PHQ QUESTIONS 1-9: 0
2. FEELING DOWN, DEPRESSED OR HOPELESS: 0

## 2023-12-14 NOTE — PROGRESS NOTES
(LIPITOR) 20 MG tablet              77 y.o. M consulted for right leg melanoma presented to [facility] on 1/25/2023. He was found of the right calf melanoma and Dr. 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, healed well and we discussed that the indication of adjuvant immunotherapy for 1 year to increase chance of cure, patient very interested and arranged to start [address] next week, patient will determine whether he wants to have a port placed, is also diagnosed with prostate cancer Helen score 7 pending definitive radiation therapy in [place], study has shown that [address] and radiation are compatible without significant increase of toxicity, educated for risk and management, arrange for authorization and started adjuvant Keytruda from 2/1/2023, completed prostate cancer radiation at [facility] and PSA down, hemoglobin also down to 12.4, discussed fatigue should improve with some recovery time, TSH still high and increase Synthroid to 125 mcg daily, right lower extremity swelling and left lower extremity sciatic pain with positional relief, right leg Doppler ruled out DVT and left leg Doppler to rule out arterial stenosis, consult orthopedics for left leg pain scheduled for epidural steroid injection, 11/2/2023, generally feels well and TSH responded and continue Synthroid 125 mcg daily, patient discussed their end of year schedule and desired to complete all treatment before the end of the year, return in 6 weeks to plan for next dose of 400 mg Keytruda, also discussed surveillance and may want to consider follow-up with Dr. Eusebia Rhoades with exam and scans to save cost and return to oncology as needed. Here for last pembro - double dose as they are desiring to get all treatments in before the end of the year. He is feeling well overall. He had spinal surgery about two weeks ago and pain now relieved.  He denies any symptoms of

## 2023-12-14 NOTE — PROGRESS NOTES
Arrived to the [address] [place] [address]. [facility]. keytruda completed. Patient tolerated well. Patient instructed to call provider with temperature of 100.4 or greater or nausea/vomiting/ diarrhea or pain not controlled by medications  Discharged ambulatory.

## 2023-12-14 NOTE — PROGRESS NOTES
Patient to port lab for port access and lab draw. Port accessed using 20g 0.75\" woods needle without difficulty. Labs drawn and port flushed. Port remains accessed. Patient tolerated well. Discharged ambulatory.

## 2023-12-15 RX ORDER — ATORVASTATIN CALCIUM 20 MG/1
20 TABLET, FILM COATED ORAL EVERY MORNING
Qty: 90 TABLET | OUTPATIENT
Start: 2023-12-15

## 2023-12-27 ENCOUNTER — HOSPITAL ENCOUNTER (OUTPATIENT)
Dept: INTERVENTIONAL RADIOLOGY/VASCULAR | Age: 78
Discharge: HOME | End: 2023-12-30
Payer: MEDICARE

## 2023-12-27 VITALS
DIASTOLIC BLOOD PRESSURE: 89 MMHG | HEIGHT: 69 IN | HEART RATE: 97 BPM | WEIGHT: 175 LBS | OXYGEN SATURATION: 97 % | TEMPERATURE: 97.8 F | RESPIRATION RATE: 18 BRPM | SYSTOLIC BLOOD PRESSURE: 136 MMHG | BODY MASS INDEX: 25.92 KG/M2

## 2023-12-27 DIAGNOSIS — C43.71 MELANOMA OF RIGHT POSTERIOR CALF (HCC): ICD-10-CM

## 2023-12-27 PROCEDURE — 2500000003 HC RX 250 WO HCPCS: Performed by: PHYSICIAN ASSISTANT

## 2023-12-27 PROCEDURE — 36590 REMOVAL TUNNELED CV CATH: CPT

## 2023-12-27 RX ORDER — LIDOCAINE HYDROCHLORIDE AND EPINEPHRINE BITARTRATE 20; .01 MG/ML; MG/ML
INJECTION, SOLUTION SUBCUTANEOUS PRN
Status: COMPLETED | OUTPATIENT
Start: 2023-12-27 | End: 2023-12-27

## 2023-12-27 RX ADMIN — LIDOCAINE HYDROCHLORIDE AND EPINEPHRINE 5 ML: 20; 10 INJECTION, SOLUTION INFILTRATION; PERINEURAL at 13:37

## 2023-12-27 NOTE — OR NURSING
Recovery period without difficulty. Pt alert and oriented and denies pain. Dressing is clean, dry, and intact. Reviewed discharge instructions with patient and patient verbalized understanding. Pt escorted to lobby discharge area via wheelchair. Vital signs and Stephen score completed.

## 2023-12-27 NOTE — DISCHARGE INSTRUCTIONS
Interventional Radiology department at [phone]. After business hours (5pm) and weekends, call the answering service at [phone] and ask for the Radiologist on call to be paged.

## 2024-01-08 DIAGNOSIS — I48.20 ATRIAL FIBRILLATION, CHRONIC (HCC): ICD-10-CM

## 2024-01-08 DIAGNOSIS — E78.5 HYPERLIPIDEMIA, UNSPECIFIED HYPERLIPIDEMIA TYPE: ICD-10-CM

## 2024-01-08 RX ORDER — ATORVASTATIN CALCIUM 20 MG/1
20 TABLET, FILM COATED ORAL EVERY MORNING
Qty: 90 TABLET | OUTPATIENT
Start: 2024-01-08

## 2024-01-10 ENCOUNTER — OFFICE VISIT (OUTPATIENT)
Dept: ORTHOPEDIC SURGERY | Age: 79
End: 2024-01-10

## 2024-01-10 DIAGNOSIS — Z98.890 S/P LAMINECTOMY: Primary | ICD-10-CM

## 2024-01-10 PROCEDURE — 99024 POSTOP FOLLOW-UP VISIT: CPT | Performed by: ORTHOPAEDIC SURGERY

## 2024-01-10 RX ORDER — METHYLPREDNISOLONE 4 MG/1
TABLET ORAL
Qty: 1 KIT | Refills: 0 | Status: SHIPPED | OUTPATIENT
Start: 2024-01-10

## 2024-01-10 RX ORDER — HYDROCODONE BITARTRATE AND ACETAMINOPHEN 5; 325 MG/1; MG/1
1 TABLET ORAL EVERY 6 HOURS PRN
Qty: 28 TABLET | Refills: 0 | Status: SHIPPED | OUTPATIENT
Start: 2024-01-10 | End: 2024-01-17

## 2024-01-10 RX ORDER — GABAPENTIN 100 MG/1
100 CAPSULE ORAL NIGHTLY
Qty: 30 CAPSULE | Refills: 2 | Status: SHIPPED | OUTPATIENT
Start: 2024-01-10 | End: 2024-04-09

## 2024-01-10 NOTE — PROGRESS NOTES
Name: James Giraldo  YOB: 1945  Gender: male  MRN: [id number]  Age: 78 y.o.      Chief Complaint: 6-week postop visit    History of Present Illness:      This is a very pleasant 78 y.o. male who presents with a history of a L5-S1 laminectomy.  He is 6 weeks postop.  He was doing well until about 12/30/2023 when his pain became worse.  He localized the pain to the posterior aspect of his calf.  Currently his pain is minimal however it can get severe.  Prior to surgery he was using an assistive device to ambulate.  He is not using an assistive device today.        Medications:       Current Outpatient Medications:     methylPREDNISolone (MEDROL DOSEPACK) 4 MG tablet, Take by mouth as directed., Disp: 1 kit, Rfl: 0    gabapentin (NEURONTIN) 100 MG capsule, Take 1 capsule by mouth nightly for 90 days., Disp: 30 capsule, Rfl: 2    HYDROcodone-acetaminophen (NORCO) 5-325 MG per tablet, Take 1 tablet by mouth every 6 hours as needed for Pain for up to 7 days. Intended supply: 74 days. Take lowest dose possible to manage pain Max Daily Amount: 4 tablets, Disp: 28 tablet, Rfl: 0    apixaban (ELIQUIS) 5 MG TABS tablet, Take 1 tablet by mouth 2 times daily, Disp: 60 tablet, Rfl: 1    atorvastatin (LIPITOR) 20 MG tablet, Take 1 tablet by mouth every morning, Disp: 30 tablet, Rfl: 1    metoprolol tartrate (LOPRESSOR) 25 MG tablet, Take 1 tablet by mouth 2 times daily, Disp: 60 tablet, Rfl: 1    levothyroxine (SYNTHROID) 125 MCG tablet, Take 1 tablet by mouth daily, Disp: 90 tablet, Rfl: 1    tamsulosin (FLOMAX) 0.4 MG capsule, 1 capsule daily, Disp: , Rfl:     cetirizine (ZYRTEC) 10 MG tablet, Take 1 tablet by mouth as needed, Disp: , Rfl:     Multiple Vitamin (MULTIVITAMINS PO), Take 1 tablet by mouth daily, Disp: , Rfl:     Allergies:    Allergies   Allergen Reactions    Lisinopril Cough         Physical Exam:     This is a well developed well nourished male adult in no acute distress.     Mood and affect

## 2024-01-28 NOTE — PROGRESS NOTES
[facility] CARDIOLOGY  [address] [place] [address], [address]  [place] [address]  PHONE: [phone]        NAME:  James Giraldo  : 1945  MRN: [id number]     PCP:  File, Not On, FNP    SUBJECTIVE:   James Giraldo is a 78 y.o. male seen for a consultation visit regarding the following:     Chief Complaint   Patient presents with    Atrial Fibrillation    New Patient        HPI:  He presents establish new cardiac care with a history of hypertension, dyslipidemia, and paroxysmal atrial fibrillation.  He had MONICA directed cardioversion in 2022.  He is on Eliquis and Lopressor with good compliance.  He recently had a malignant melanoma diagnosed in his right lower extremity/calf status post excision with skin grafting.  XRT to prostate CA as well.  Had lumbar spine surgery 2 mos ago without adverse cardiac event.     Doing well since last visit without interval angina, CHF, palpitations, edema, presyncope or syncope.  Vitals controlled and tolerating meds well. Staying active without any significant limitations with exception of low back pain with sciatica.  Recently started low dose gabapentin but hasn't had any improvement in symptoms.     [place] echo 8/15/2023:    The left ventricular systolic function is normal (55-65%).     Unable to assess left ventricular diastolic function  (atrial   fibrillation).    The right ventricular systolic function is normal.     Normal left ventricle cavity size.     The left atrium is mildly dilated.     The right atrium is mildly dilated.     Aortic valve calcification without evidence of stenosis.     Mild mitral valve regurgitation.     The ascending aorta is mildly dilated.     He is in rate controlled atrial fibrillation the day and was in A-fib in 2023 as well.  He likely has longstanding persistent atrial fibrillation by definition but remains asymptomatic and will continue with rate control and anticoagulation strategy for now.    Past Medical

## 2024-01-31 ENCOUNTER — TELEPHONE (OUTPATIENT)
Dept: ORTHOPEDIC SURGERY | Age: 79
End: 2024-01-31

## 2024-01-31 DIAGNOSIS — M54.16 LUMBAR RADICULOPATHY: ICD-10-CM

## 2024-01-31 DIAGNOSIS — M48.062 SPINAL STENOSIS OF LUMBAR REGION WITH NEUROGENIC CLAUDICATION: ICD-10-CM

## 2024-01-31 DIAGNOSIS — Z98.890 S/P LAMINECTOMY: Primary | ICD-10-CM

## 2024-01-31 DIAGNOSIS — M48.061 SPINAL STENOSIS OF LUMBAR REGION, UNSPECIFIED WHETHER NEUROGENIC CLAUDICATION PRESENT: ICD-10-CM

## 2024-01-31 RX ORDER — HYDROCODONE BITARTRATE AND ACETAMINOPHEN 5; 325 MG/1; MG/1
1 TABLET ORAL EVERY 6 HOURS PRN
Qty: 28 TABLET | Refills: 0 | Status: SHIPPED | OUTPATIENT
Start: 2024-01-31 | End: 2024-02-07

## 2024-01-31 NOTE — TELEPHONE ENCOUNTER
He needs to get a refill of oxycodone sent to the pharmacy on file. He is no better than he was the last time they were in on the 10th of January. His wife wanted you to know.

## 2024-01-31 NOTE — TELEPHONE ENCOUNTER
Requested Prescriptions     Pending Prescriptions Disp Refills    HYDROcodone-acetaminophen (NORCO) 5-325 MG per tablet 20 tablet 0     Sig: Take 1 tablet by mouth every 6 hours as needed for Pain for up to 5 days. Intended supply: 5 days. Take lowest dose possible to manage pain Max Daily Amount: 4 tablets

## 2024-02-01 ENCOUNTER — OFFICE VISIT (OUTPATIENT)
Age: 79
End: 2024-02-01

## 2024-02-01 VITALS
BODY MASS INDEX: 25.81 KG/M2 | HEIGHT: 71 IN | WEIGHT: 184.4 LBS | SYSTOLIC BLOOD PRESSURE: 138 MMHG | DIASTOLIC BLOOD PRESSURE: 70 MMHG

## 2024-02-01 DIAGNOSIS — C77.9 MELANOMA METASTATIC TO LYMPH NODE (HCC): ICD-10-CM

## 2024-02-01 DIAGNOSIS — C43.9 MELANOMA METASTATIC TO LYMPH NODE (HCC): ICD-10-CM

## 2024-02-01 DIAGNOSIS — E78.5 DYSLIPIDEMIA: ICD-10-CM

## 2024-02-01 DIAGNOSIS — I48.0 PAROXYSMAL ATRIAL FIBRILLATION (HCC): Primary | ICD-10-CM

## 2024-02-01 DIAGNOSIS — I10 PRIMARY HYPERTENSION: ICD-10-CM

## 2024-02-01 RX ORDER — ATORVASTATIN CALCIUM 20 MG/1
20 TABLET, FILM COATED ORAL EVERY MORNING
Qty: 90 TABLET | Refills: 3 | Status: SHIPPED | OUTPATIENT
Start: 2024-02-01

## 2024-02-01 ASSESSMENT — ENCOUNTER SYMPTOMS: BACK PAIN: 1

## 2024-02-02 DIAGNOSIS — Z98.890 S/P LAMINECTOMY: ICD-10-CM

## 2024-02-02 DIAGNOSIS — M54.16 LUMBAR RADICULOPATHY: Primary | ICD-10-CM

## 2024-02-02 RX ORDER — GABAPENTIN 300 MG/1
300 CAPSULE ORAL DAILY
Qty: 30 CAPSULE | Refills: 0 | Status: SHIPPED | OUTPATIENT
Start: 2024-02-02 | End: 2024-03-03

## 2024-02-02 NOTE — TELEPHONE ENCOUNTER
I returned patients call states he needs refill on gabapentin states 100 mg isn't helping would like to know if he can take 300 mg. States he still has left leg pain.        Per Dr. Sneed ok for patient to try Gabapentin 300 mg and will follow up in 2 weeks.    Patient is aware prescription will be sent to pharmacy and to call the office with any concerns.    Prescription sent to Dr. Sneed to review and sign to send to pharmacy on file.

## 2024-02-02 NOTE — TELEPHONE ENCOUNTER
She is asking for a refill on gabapentin but says the 100mg is not doing anything. She wants to know if they can try 300mg. She says he is no better than when he saw you on [address]. They do use [facility] on [address].

## 2024-02-21 ENCOUNTER — OFFICE VISIT (OUTPATIENT)
Dept: ORTHOPEDIC SURGERY | Age: 79
End: 2024-02-21

## 2024-02-21 DIAGNOSIS — Z98.890 S/P LAMINECTOMY: Primary | ICD-10-CM

## 2024-02-21 PROCEDURE — 99024 POSTOP FOLLOW-UP VISIT: CPT | Performed by: ORTHOPAEDIC SURGERY

## 2024-02-21 RX ORDER — GABAPENTIN 300 MG/1
300 CAPSULE ORAL 3 TIMES DAILY
Qty: 90 CAPSULE | Refills: 3 | Status: SHIPPED | OUTPATIENT
Start: 2024-02-21 | End: 2024-06-20

## 2024-02-21 NOTE — PROGRESS NOTES
Name: James Giraldo  YOB: 1945  Gender: male  MRN: [id number]  Age: 78 y.o.      Chief Complaint: 3-month follow-up    History of Present Illness:      This is a very pleasant 78 y.o. male who presents with a very L5-S1 laminectomy.  He is 3 months postoperative.  Initially he did well but he began having pain in his calf.  He also gets some pain in his buttock and posterior thigh and into his calf.  Currently his pain is minimal however it gets severe with walking.  He uses a cane when he is out and about        Medications:       Current Outpatient Medications:     gabapentin (NEURONTIN) 300 MG capsule, Take 1 capsule by mouth 3 times daily for 120 days. Intended supply: 30 days, Disp: 90 capsule, Rfl: 3    apixaban (ELIQUIS) 5 MG TABS tablet, Take 1 tablet by mouth 2 times daily, Disp: 180 tablet, Rfl: 3    atorvastatin (LIPITOR) 20 MG tablet, Take 1 tablet by mouth every morning, Disp: 90 tablet, Rfl: 3    metoprolol tartrate (LOPRESSOR) 25 MG tablet, Take 1 tablet by mouth 2 times daily, Disp: 180 tablet, Rfl: 3    methylPREDNISolone (MEDROL DOSEPACK) 4 MG tablet, Take by mouth as directed., Disp: 1 kit, Rfl: 0    levothyroxine (SYNTHROID) 125 MCG tablet, Take 1 tablet by mouth daily, Disp: 90 tablet, Rfl: 1    tamsulosin (FLOMAX) 0.4 MG capsule, 1 capsule daily, Disp: , Rfl:     cetirizine (ZYRTEC) 10 MG tablet, Take 1 tablet by mouth as needed, Disp: , Rfl:     Multiple Vitamin (MULTIVITAMINS PO), Take 1 tablet by mouth daily, Disp: , Rfl:     Allergies:    Allergies   Allergen Reactions    Lisinopril Cough         Physical Exam:     This is a well developed well nourished male adult in no acute distress.     Mood and affect are appropriate.    Oriented to person, place, and time.    Respirations are unlabored and there is no evidence of cyanosis        Sensory testing:     L2 L3 L4 L5 S1 S2   Right 2 2 2 2 2 2   Left 2 2 2 2 2 2     0=absent; 1=altered; 2=normal; NT= not

## 2024-02-23 ENCOUNTER — HOSPITAL ENCOUNTER (OUTPATIENT)
Dept: ULTRASOUND IMAGING | Age: 79
End: 2024-02-23
Attending: SURGERY
Payer: MEDICARE

## 2024-02-23 DIAGNOSIS — C77.9 MELANOMA METASTATIC TO LYMPH NODE (HCC): ICD-10-CM

## 2024-02-23 DIAGNOSIS — C43.71 MELANOMA OF RIGHT POSTERIOR CALF (HCC): ICD-10-CM

## 2024-02-23 DIAGNOSIS — E78.5 DYSLIPIDEMIA: ICD-10-CM

## 2024-02-23 LAB
ALBUMIN SERPL-MCNC: 3.8 G/DL (ref 3.2–4.6)
ALBUMIN/GLOB SERPL: 1.2 (ref 0.4–1.6)
ALP SERPL-CCNC: 104 U/L (ref 50–136)
ALT SERPL-CCNC: 29 U/L (ref 12–65)
AST SERPL-CCNC: 23 U/L (ref 15–37)
BILIRUB DIRECT SERPL-MCNC: 0.1 MG/DL
BILIRUB SERPL-MCNC: 0.4 MG/DL (ref 0.2–1.1)
CHOLEST SERPL-MCNC: 174 MG/DL
GLOBULIN SER CALC-MCNC: 3.3 G/DL (ref 2.8–4.5)
HDLC SERPL-MCNC: 38 MG/DL (ref 40–60)
HDLC SERPL: 4.6
LDLC SERPL CALC-MCNC: 75.2 MG/DL
PROT SERPL-MCNC: 7.1 G/DL (ref 6.3–8.2)
TRIGL SERPL-MCNC: 304 MG/DL (ref 35–150)
VLDLC SERPL CALC-MCNC: 60.8 MG/DL (ref 6–23)

## 2024-02-23 PROCEDURE — 76882 US LMTD JT/FCL EVL NVASC XTR: CPT

## 2024-02-26 ENCOUNTER — TELEPHONE (OUTPATIENT)
Age: 79
End: 2024-02-26

## 2024-02-26 NOTE — TELEPHONE ENCOUNTER
----- Message from Dano Finn MD sent at 2/25/2024  9:53 AM EST -----  LDL looks good but triglycerides are still a bit high.  Continue Lipitor as currently taking but eat low sugar/low-carb diet and try to do some cardiovascular exercise at least 30 minutes daily.  Recheck lipid and liver in 6 months prior to follow-up with me.  ----- Message -----  From: Yannick Oneal Incoming Jorgito W/Richard [employer]  Sent: 2/23/2024   5:32 PM EST  To: Dano Finn MD

## 2024-03-06 ENCOUNTER — OFFICE VISIT (OUTPATIENT)
Dept: SURGERY | Age: 79
End: 2024-03-06

## 2024-03-06 VITALS
HEIGHT: 71 IN | SYSTOLIC BLOOD PRESSURE: 120 MMHG | WEIGHT: 188 LBS | HEART RATE: 86 BPM | OXYGEN SATURATION: 98 % | DIASTOLIC BLOOD PRESSURE: 80 MMHG | BODY MASS INDEX: 26.32 KG/M2

## 2024-03-06 DIAGNOSIS — C77.9 MELANOMA METASTATIC TO LYMPH NODE (HCC): ICD-10-CM

## 2024-03-06 DIAGNOSIS — C43.71 MELANOMA OF RIGHT POSTERIOR CALF (HCC): Primary | ICD-10-CM

## 2024-03-06 NOTE — PROGRESS NOTES
professional (not separately reported);     or  Category 3: Discussion of management or test interpretation  ?Discussion of management or test interpretation with external physician/other qualified health care professional/appropriate source (not separately reported)   Moderate risk of morbidity from additional diagnostic testing or treatment  Examples only:  ?Prescription drug management   ?Decision regarding minor surgery with identified patient or procedure risk factors  ?Decision regarding elective major surgery without identified patient or procedure risk factors   ?Diagnosis or treatment significantly limited by social determinants of health       65963  54221 High High  ?1or more chronic illnesses with severe exacerbation, progression, or side effects of treatment;    or  ?1 acute or chronic illness or injury that poses a threat to life or bodily function   Extensive  (Must meet the requirements of at least 2 out of 3 categories)  Category 1: Tests, documents, or independent historian(s)  ?Any combination of 3 from the following:   ?Review of prior external note(s) from each unique source*;  ?Review of the result(s) of each unique test*;   ?Ordering of each unique test*;   ?Assessment requiring an independent historian(s)    or   Category 2: Independent interpretation of tests   ?Independent interpretation of a test performed by another physician/other qualified health care professional (not separately reported);     or  Category 3: Discussion of management or test interpretation  ?Discussion of management or test interpretation with external physician/other qualified health care professional/appropriate source (not separately reported)   High risk of morbidity from additional diagnostic testing or treatment  Examples only:  ?Drug therapy requiring intensive monitoring for toxicity  ?Decision regarding elective major surgery with identified patient or procedure risk factors-we decided not to proceed with

## 2024-04-01 RX ORDER — LEVOTHYROXINE SODIUM 0.03 MG/1
25 TABLET ORAL DAILY
Qty: 30 TABLET | Refills: 5 | OUTPATIENT
Start: 2024-04-01

## 2024-04-11 ENCOUNTER — NURSE TRIAGE (OUTPATIENT)
Dept: OTHER | Facility: CLINIC | Age: 79
End: 2024-04-11

## 2024-04-11 NOTE — TELEPHONE ENCOUNTER
Location of patient: [place]    Received call from Yesenia at [facility]; Patient with Red Flag Complaint requesting to establish care with anywhere close.    Subjective: Caller states \"lump on throat\"     Current Symptoms: Hx of cancer, melanoma stage 3-infusion, hx of prostate cancer-radiation, hx spine surgery.  On thyroid medication.  Knot size of glass opening at end of neck.  Hindering swallowing and breathing. Not sure if its cancer coming back.  Hx of a fib.  Denies pain. Spoke to Oncologist     Onset: 1 day ago; sudden    Associated Symptoms: NA    Pain Severity: Denies    Temperature: Denies    What has been tried: NA    LMP: NA Pregnant: NA    Recommended disposition: See PCP within 3 Days, advised caller if unable to get an appointment in the suggested time frame to go to an UCC or ED, caller agreeable.     Care advice provided, patient verbalizes understanding; denies any other questions or concerns; instructed to call back for any new or worsening symptoms.    Patient/Caller agrees with recommended disposition; writer provided warm transfer to Donna at [facility] for appointment scheduling    Attention Provider:  Thank you for allowing me to participate in the care of your patient.  The patient was connected to triage in response to information provided to the [facility].  Please do not respond through this encounter as the response is not directed to a shared pool.      Reason for Disposition   Patient wants to be seen    Protocols used: Skin Lump or Localized Swelling-ADULT-[place]

## 2024-04-12 ENCOUNTER — TRANSCRIBE ORDERS (OUTPATIENT)
Dept: SCHEDULING | Age: 79
End: 2024-04-12

## 2024-04-12 DIAGNOSIS — E04.9 NON-TOXIC GOITER: Primary | ICD-10-CM

## 2024-04-21 NOTE — PROGRESS NOTES
Patient arrived ambulatory to infusion. Labs + Keytruda completed. Patient tolerated well. Port deaccessed and discharged ambulatory. Patient aware of next infusion appt on 6/29.
No

## 2024-04-22 DIAGNOSIS — C43.71 MELANOMA OF RIGHT POSTERIOR CALF (HCC): ICD-10-CM

## 2024-04-22 DIAGNOSIS — E03.2 HYPOTHYROIDISM DUE TO MEDICATION: ICD-10-CM

## 2024-04-22 DIAGNOSIS — Z79.899 HIGH RISK MEDICATION USE: ICD-10-CM

## 2024-04-22 LAB
ALBUMIN SERPL-MCNC: 3.4 G/DL (ref 3.2–4.6)
ALBUMIN/GLOB SERPL: 1 (ref 0.4–1.6)
ALP SERPL-CCNC: 741 U/L (ref 50–136)
ALT SERPL-CCNC: 156 U/L (ref 12–65)
ANION GAP SERPL CALC-SCNC: 5 MMOL/L (ref 2–11)
AST SERPL-CCNC: 84 U/L (ref 15–37)
BASOPHILS # BLD: 0.1 K/UL (ref 0–0.2)
BASOPHILS NFR BLD: 1 % (ref 0–2)
BILIRUB SERPL-MCNC: 0.9 MG/DL (ref 0.2–1.1)
BUN SERPL-MCNC: 12 MG/DL (ref 8–23)
CALCIUM SERPL-MCNC: 9.6 MG/DL (ref 8.3–10.4)
CHLORIDE SERPL-SCNC: 107 MMOL/L (ref 103–113)
CO2 SERPL-SCNC: 28 MMOL/L (ref 21–32)
CREAT SERPL-MCNC: 0.7 MG/DL (ref 0.8–1.5)
DIFFERENTIAL METHOD BLD: ABNORMAL
EOSINOPHIL # BLD: 0.3 K/UL (ref 0–0.8)
EOSINOPHIL NFR BLD: 6 % (ref 0.5–7.8)
ERYTHROCYTE [DISTWIDTH] IN BLOOD BY AUTOMATED COUNT: 12.7 % (ref 11.9–14.6)
GLOBULIN SER CALC-MCNC: 3.3 G/DL (ref 2.8–4.5)
GLUCOSE SERPL-MCNC: 110 MG/DL (ref 65–100)
HCT VFR BLD AUTO: 38.8 % (ref 41.1–50.3)
HGB BLD-MCNC: 12.3 G/DL (ref 13.6–17.2)
IMM GRANULOCYTES # BLD AUTO: 0 K/UL (ref 0–0.5)
IMM GRANULOCYTES NFR BLD AUTO: 0 % (ref 0–5)
LYMPHOCYTES # BLD: 0.8 K/UL (ref 0.5–4.6)
LYMPHOCYTES NFR BLD: 16 % (ref 13–44)
MCH RBC QN AUTO: 32.8 PG (ref 26.1–32.9)
MCHC RBC AUTO-ENTMCNC: 31.7 G/DL (ref 31.4–35)
MCV RBC AUTO: 103.5 FL (ref 82–102)
MONOCYTES # BLD: 0.5 K/UL (ref 0.1–1.3)
MONOCYTES NFR BLD: 10 % (ref 4–12)
NEUTS SEG # BLD: 3.5 K/UL (ref 1.7–8.2)
NEUTS SEG NFR BLD: 67 % (ref 43–78)
NRBC # BLD: 0 K/UL (ref 0–0.2)
PLATELET # BLD AUTO: 365 K/UL (ref 150–450)
PMV BLD AUTO: 9.9 FL (ref 9.4–12.3)
POTASSIUM SERPL-SCNC: 4.4 MMOL/L (ref 3.5–5.1)
PROT SERPL-MCNC: 6.7 G/DL (ref 6.3–8.2)
RBC # BLD AUTO: 3.75 M/UL (ref 4.23–5.6)
SODIUM SERPL-SCNC: 140 MMOL/L (ref 136–146)
TSH, 3RD GENERATION: 1.19 UIU/ML (ref 0.36–3.74)
WBC # BLD AUTO: 5.2 K/UL (ref 4.3–11.1)

## 2024-04-24 ENCOUNTER — TELEPHONE (OUTPATIENT)
Age: 79
End: 2024-04-24

## 2024-04-24 NOTE — TELEPHONE ENCOUNTER
Pt's wife, Stacie reports change in pt's sx.  A Fib, SOB, CLARK and 5# wt gain overnight.  Pt's taking otc diuretic with relief but weight comes back.   She's not sure of VS. No BP monitor.   Asks about lab results? Informed her Dr. Hutchins is responsible for lab results. Call his office.   Informed her diuretics may be aggravating A Fib if it's dropping BP.   Advise elevate feet when sitting, watch Na intake, 64oz fluids daily. Avoid caffeine.   If they get a BP cuff, bring it to f/u tomorrow and we can calibrate.   FU tomorrow 10a Dr. Huma MA. Too many variable to address in a phone call.   To ER for any worsening sx. Stacie voiced understanding and agreement with POC.  cgh

## 2024-04-24 NOTE — TELEPHONE ENCOUNTER
Patients wife called stating her  is experiencing the following symptoms :    Afib  SOB   Labored breathing with little exertion  Taking a ' fluid pill '

## 2024-04-25 ENCOUNTER — OFFICE VISIT (OUTPATIENT)
Age: 79
End: 2024-04-25
Payer: MEDICARE

## 2024-04-25 VITALS
HEART RATE: 77 BPM | DIASTOLIC BLOOD PRESSURE: 72 MMHG | HEIGHT: 71 IN | BODY MASS INDEX: 25.76 KG/M2 | WEIGHT: 184 LBS | SYSTOLIC BLOOD PRESSURE: 130 MMHG

## 2024-04-25 DIAGNOSIS — I48.0 PAROXYSMAL ATRIAL FIBRILLATION (HCC): Primary | ICD-10-CM

## 2024-04-25 DIAGNOSIS — E78.5 DYSLIPIDEMIA: ICD-10-CM

## 2024-04-25 DIAGNOSIS — R06.09 DOE (DYSPNEA ON EXERTION): ICD-10-CM

## 2024-04-25 DIAGNOSIS — I10 PRIMARY HYPERTENSION: ICD-10-CM

## 2024-04-25 PROCEDURE — 3078F DIAST BP <80 MM HG: CPT | Performed by: INTERNAL MEDICINE

## 2024-04-25 PROCEDURE — 93000 ELECTROCARDIOGRAM COMPLETE: CPT | Performed by: INTERNAL MEDICINE

## 2024-04-25 PROCEDURE — 1123F ACP DISCUSS/DSCN MKR DOCD: CPT | Performed by: INTERNAL MEDICINE

## 2024-04-25 PROCEDURE — 3075F SYST BP GE 130 - 139MM HG: CPT | Performed by: INTERNAL MEDICINE

## 2024-04-25 PROCEDURE — 99214 OFFICE O/P EST MOD 30 MIN: CPT | Performed by: INTERNAL MEDICINE

## 2024-04-25 ASSESSMENT — ENCOUNTER SYMPTOMS
NAUSEA: 0
WHEEZING: 0
DIARRHEA: 0
VOMITING: 0
ABDOMINAL DISTENTION: 0
CONSTIPATION: 0
BACK PAIN: 1
SHORTNESS OF BREATH: 1
COUGH: 0
ABDOMINAL PAIN: 0
PHOTOPHOBIA: 0

## 2024-04-25 NOTE — PROGRESS NOTES
[facility] CARDIOLOGY  [address] [place] [address], [address]  [place] [address]  PHONE: [phone]        NAME:  James Giraldo  : 1945  MRN: [id number]     PCP:  Anna Cardoza, JOVITA - ASHLEY    SUBJECTIVE:   James Giraldo is a 78 y.o. male seen for a follow-up visit regarding the following:     Chief Complaint   Patient presents with    Atrial Fibrillation    Hypertension        HPI:  He presented recently to establish new cardiac care with a history of hypertension, dyslipidemia, and paroxysmal atrial fibrillation.  He had MONICA directed cardioversion in 2022.  He is on Eliquis and Lopressor with good compliance.  He recently had a malignant melanoma diagnosed in his right lower extremity/calf status post excision with skin grafting.  XRT to prostate CA as well.  Had lumbar spine surgery 2 mos ago without adverse cardiac event.     Doing well since last visit without interval angina, CHF, palpitations, edema, presyncope or syncope.  Vitals controlled and tolerating meds well. Staying active without any significant limitations with exception of low back pain with sciatica.  Recently started low dose gabapentin but hasn't had any improvement in symptoms.     [place] echo 8/15/2023:    The left ventricular systolic function is normal (55-65%).     Unable to assess left ventricular diastolic function  (atrial   fibrillation).    The right ventricular systolic function is normal.     Normal left ventricle cavity size.     The left atrium is mildly dilated.     The right atrium is mildly dilated.     Aortic valve calcification without evidence of stenosis.     Mild mitral valve regurgitation.     The ascending aorta is mildly dilated.     He is in rate controlled atrial fibrillation today and was in A-fib in 2023 as well.  He likely has longstanding persistent atrial fibrillation by definition but remains clinically asymptomatic and will continue with rate control and

## 2024-04-29 ENCOUNTER — HOSPITAL ENCOUNTER (OUTPATIENT)
Dept: ULTRASOUND IMAGING | Age: 79
Discharge: HOME | End: 2024-05-02
Attending: SURGERY
Payer: MEDICARE

## 2024-04-29 DIAGNOSIS — C77.9 MELANOMA METASTATIC TO LYMPH NODE (HCC): ICD-10-CM

## 2024-04-29 DIAGNOSIS — C43.71 MELANOMA OF RIGHT POSTERIOR CALF (HCC): ICD-10-CM

## 2024-04-29 PROCEDURE — 76882 US LMTD JT/FCL EVL NVASC XTR: CPT

## 2024-05-02 ENCOUNTER — HOSPITAL ENCOUNTER (OUTPATIENT)
Dept: PET IMAGING | Age: 79
Discharge: HOME | End: 2024-05-02
Payer: MEDICARE

## 2024-05-02 DIAGNOSIS — C43.71 MELANOMA OF RIGHT POSTERIOR CALF (HCC): ICD-10-CM

## 2024-05-02 DIAGNOSIS — C77.9 MELANOMA METASTATIC TO LYMPH NODE (HCC): ICD-10-CM

## 2024-05-02 LAB
GLUCOSE BLD STRIP.AUTO-MCNC: 115 MG/DL (ref 65–100)
SERVICE CMNT-IMP: ABNORMAL

## 2024-05-02 PROCEDURE — 82962 GLUCOSE BLOOD TEST: CPT

## 2024-05-02 PROCEDURE — 6360000004 HC RX CONTRAST MEDICATION: Performed by: SURGERY

## 2024-05-02 PROCEDURE — A9609 HC RX DIAGNOSTIC RADIOPHARMACEUTICAL: HCPCS | Performed by: SURGERY

## 2024-05-02 PROCEDURE — 3430000000 HC RX DIAGNOSTIC RADIOPHARMACEUTICAL: Performed by: SURGERY

## 2024-05-02 PROCEDURE — 2580000003 HC RX 258: Performed by: SURGERY

## 2024-05-02 PROCEDURE — 78816 PET IMAGE W/CT FULL BODY: CPT

## 2024-05-02 RX ORDER — SODIUM CHLORIDE 0.9 % (FLUSH) 0.9 %
20 SYRINGE (ML) INJECTION AS NEEDED
Status: DISCONTINUED | OUTPATIENT
Start: 2024-05-02 | End: 2024-05-06 | Stop reason: HOSPADM

## 2024-05-02 RX ORDER — FLUDEOXYGLUCOSE F 18 200 MCI/ML
13.01 INJECTION, SOLUTION INTRAVENOUS
Status: COMPLETED | OUTPATIENT
Start: 2024-05-02 | End: 2024-05-02

## 2024-05-02 RX ADMIN — SODIUM CHLORIDE, PRESERVATIVE FREE 20 ML: 5 INJECTION INTRAVENOUS at 07:55

## 2024-05-02 RX ADMIN — DIATRIZOATE MEGLUMINE AND DIATRIZOATE SODIUM 10 ML: 660; 100 LIQUID ORAL; RECTAL at 07:55

## 2024-05-02 RX ADMIN — FLUDEOXYGLUCOSE F 18 13.01 MILLICURIE: 200 INJECTION, SOLUTION INTRAVENOUS at 07:55

## 2024-05-16 DIAGNOSIS — E78.5 DYSLIPIDEMIA: ICD-10-CM

## 2024-05-16 LAB
ALBUMIN SERPL-MCNC: 3.7 G/DL (ref 3.2–4.6)
ALBUMIN/GLOB SERPL: 1.3 (ref 1–1.9)
ALP SERPL-CCNC: 413 U/L (ref 40–129)
ALT SERPL-CCNC: 48 U/L (ref 12–65)
AST SERPL-CCNC: 37 U/L (ref 15–37)
BILIRUB DIRECT SERPL-MCNC: 0.3 MG/DL (ref 0–0.4)
BILIRUB SERPL-MCNC: 0.7 MG/DL (ref 0–1.2)
GLOBULIN SER CALC-MCNC: 2.8 G/DL (ref 2.3–3.5)
PROT SERPL-MCNC: 6.5 G/DL (ref 6.3–8.2)

## 2024-05-17 ENCOUNTER — TELEPHONE (OUTPATIENT)
Age: 79
End: 2024-05-17

## 2024-05-17 NOTE — TELEPHONE ENCOUNTER
----- Message from Dano Finn MD sent at 5/16/2024  5:23 PM EDT -----  No major abnormalities.  Continue current meds without change and keep routine followup.

## 2024-05-21 ENCOUNTER — OFFICE VISIT (OUTPATIENT)
Age: 79
End: 2024-05-21
Payer: MEDICARE

## 2024-05-21 DIAGNOSIS — Z98.890 S/P LAMINECTOMY: Primary | ICD-10-CM

## 2024-05-21 PROCEDURE — 1123F ACP DISCUSS/DSCN MKR DOCD: CPT | Performed by: ORTHOPAEDIC SURGERY

## 2024-05-21 PROCEDURE — 99213 OFFICE O/P EST LOW 20 MIN: CPT | Performed by: ORTHOPAEDIC SURGERY

## 2024-05-21 RX ORDER — GABAPENTIN 300 MG/1
300 CAPSULE ORAL 3 TIMES DAILY
Qty: 90 CAPSULE | Refills: 3 | Status: SHIPPED | OUTPATIENT
Start: 2024-06-21 | End: 2024-10-19

## 2024-05-21 NOTE — PROGRESS NOTES
Name: James Giraldo  YOB: 1945  Gender: male  MRN: [id number]  Age: 78 y.o.      Chief Complaint: Postop follow-up    History of Present Illness:      This is a very pleasant 78 y.o. male who presents with a history of a L5-S1 Lami.  This was done in November 2023.  He is doing well overall.  The pain he was having down his leg is improved significantly.  He is not using any type of assistive device to ambulate.  He is still taking gabapentin 300 3 times daily.  He has no pain      Medications:       Current Outpatient Medications:     gabapentin (NEURONTIN) 300 MG capsule, Take 1 capsule by mouth 3 times daily for 120 days. Intended supply: 30 days, Disp: 90 capsule, Rfl: 3    apixaban (ELIQUIS) 5 MG TABS tablet, Take 1 tablet by mouth 2 times daily, Disp: 180 tablet, Rfl: 3    atorvastatin (LIPITOR) 20 MG tablet, Take 1 tablet by mouth every morning, Disp: 90 tablet, Rfl: 3    metoprolol tartrate (LOPRESSOR) 25 MG tablet, Take 1 tablet by mouth 2 times daily, Disp: 180 tablet, Rfl: 3    methylPREDNISolone (MEDROL DOSEPACK) 4 MG tablet, Take by mouth as directed. (Patient not taking: Reported on 4/25/2024), Disp: 1 kit, Rfl: 0    levothyroxine (SYNTHROID) 125 MCG tablet, Take 1 tablet by mouth daily, Disp: 90 tablet, Rfl: 1    tamsulosin (FLOMAX) 0.4 MG capsule, 1 capsule daily, Disp: , Rfl:     cetirizine (ZYRTEC) 10 MG tablet, Take 1 tablet by mouth as needed, Disp: , Rfl:     Multiple Vitamin (MULTIVITAMINS PO), Take 1 tablet by mouth daily, Disp: , Rfl:     Allergies:    Allergies   Allergen Reactions    Lisinopril Cough         Physical Exam:     This is a well developed well nourished male adult in no acute distress.     Mood and affect are appropriate.    Oriented to person, place, and time.    Respirations are unlabored and there is no evidence of cyanosis    The patient ambulates with an normal gait.     There is minimal hip irritability with internal or external rotation bilaterally.

## 2024-06-11 ENCOUNTER — TELEPHONE (OUTPATIENT)
Age: 79
End: 2024-06-11

## 2024-06-11 NOTE — TELEPHONE ENCOUNTER
----- Message from Dano Finn MD sent at 6/11/2024 11:59 AM EDT -----  Nuclear stress test looks good.  No major abnormalities.  Keep follow up visit.

## 2024-06-17 ENCOUNTER — OFFICE VISIT (OUTPATIENT)
Dept: SURGERY | Age: 79
End: 2024-06-17
Payer: MEDICARE

## 2024-06-17 VITALS — HEIGHT: 71 IN | BODY MASS INDEX: 25.66 KG/M2

## 2024-06-17 DIAGNOSIS — C61 PROSTATE CARCINOMA (HCC): ICD-10-CM

## 2024-06-17 DIAGNOSIS — C77.9 MELANOMA METASTATIC TO LYMPH NODE (HCC): Primary | ICD-10-CM

## 2024-06-17 PROCEDURE — 1123F ACP DISCUSS/DSCN MKR DOCD: CPT | Performed by: SURGERY

## 2024-06-17 PROCEDURE — 99214 OFFICE O/P EST MOD 30 MIN: CPT | Performed by: SURGERY

## 2024-06-17 NOTE — PROGRESS NOTES
XRT which she completed on 3/10/23            Level of MDM (2/3 elements below)  Number and Complexity of Problems Addressed Amount and/or Complexity of Data to be Reviewed and Analyzed  *Each unique test, order, or document contributes to the combination of 2 or combination of 3 in Category 1 below. Risk of Complications and/or Morbidity or Mortality of pt Management     77250  04784 SF Minimal  ?1self-limited or minor problem Minimal or none Minimal risk of morbidity from additional diagnostic testing or Rx   74362  72538 Low Low  ?2or more self-limited or minor problems;    or  ?1stable chronic illness;    or  ?1acute, uncomplicated illness or injury   Limited  (Must meet the requirements of at least 1 of the 2 categories)  Category 1: Tests and documents   ?Any combination of 2 from the following:  ?Review of prior external note(s) from each unique source*;  ?review of the result(s) of each unique test*;   ?ordering of each unique test*    or   Category 2: Assessment requiring an independent historian(s)  (For the categories of independent interpretation of tests and discussion of management or test interpretation, see moderate or high) Low risk of morbidity from additional diagnostic testing or treatment     81089  00946 Mod Moderate  ?1or more chronic illnesses with exacerbation, progression, or side effects of treatment;    or  ?2or more stable chronic illnesses;    or  ?1undiagnosed new problem with uncertain prognosis;    or  ?1acute illness with systemic symptoms;    or  ?1acute complicated injury   Moderate  (Must meet the requirements of at least 1 out of 3 categories)  Category 1: Tests, documents, or independent historian(s)  ?Any combination of 3 from the following:   ?Review of prior external note(s) from each unique source*;  ?Review of the result(s) of each unique test*;  ?Ordering of each unique test*;  ?Assessment requiring an independent historian(s)    or  Category 2: Independent interpretation

## 2024-07-10 ENCOUNTER — OFFICE VISIT (OUTPATIENT)
Dept: FAMILY MEDICINE CLINIC | Facility: CLINIC | Age: 79
End: 2024-07-10
Payer: MEDICARE

## 2024-07-10 VITALS
HEIGHT: 71 IN | WEIGHT: 182.2 LBS | DIASTOLIC BLOOD PRESSURE: 70 MMHG | SYSTOLIC BLOOD PRESSURE: 121 MMHG | HEART RATE: 76 BPM | BODY MASS INDEX: 25.51 KG/M2 | OXYGEN SATURATION: 97 %

## 2024-07-10 DIAGNOSIS — E78.2 MIXED HYPERLIPIDEMIA: ICD-10-CM

## 2024-07-10 DIAGNOSIS — I10 PRIMARY HYPERTENSION: ICD-10-CM

## 2024-07-10 DIAGNOSIS — Z85.46 HISTORY OF PROSTATE CANCER: ICD-10-CM

## 2024-07-10 DIAGNOSIS — E78.2 MIXED HYPERLIPIDEMIA: Primary | ICD-10-CM

## 2024-07-10 DIAGNOSIS — E03.9 HYPOTHYROIDISM, UNSPECIFIED TYPE: ICD-10-CM

## 2024-07-10 LAB
ALBUMIN SERPL-MCNC: 4.3 G/DL (ref 3.2–4.6)
ALBUMIN/GLOB SERPL: 1.6 (ref 1–1.9)
ALP SERPL-CCNC: 187 U/L (ref 40–129)
ALT SERPL-CCNC: 35 U/L (ref 12–65)
ANION GAP SERPL CALC-SCNC: 13 MMOL/L (ref 9–18)
AST SERPL-CCNC: 37 U/L (ref 15–37)
BASOPHILS # BLD: 0 K/UL (ref 0–0.2)
BASOPHILS NFR BLD: 1 % (ref 0–2)
BILIRUB SERPL-MCNC: 0.5 MG/DL (ref 0–1.2)
BUN SERPL-MCNC: 16 MG/DL (ref 8–23)
CALCIUM SERPL-MCNC: 9.7 MG/DL (ref 8.8–10.2)
CHLORIDE SERPL-SCNC: 104 MMOL/L (ref 98–107)
CHOLEST SERPL-MCNC: 146 MG/DL (ref 0–200)
CK SERPL-CCNC: 116 U/L (ref 21–215)
CO2 SERPL-SCNC: 23 MMOL/L (ref 20–28)
CREAT SERPL-MCNC: 0.83 MG/DL (ref 0.8–1.3)
DIFFERENTIAL METHOD BLD: NORMAL
EOSINOPHIL # BLD: 0.1 K/UL (ref 0–0.8)
EOSINOPHIL NFR BLD: 2 % (ref 0.5–7.8)
ERYTHROCYTE [DISTWIDTH] IN BLOOD BY AUTOMATED COUNT: 13.4 % (ref 11.9–14.6)
GLOBULIN SER CALC-MCNC: 2.7 G/DL (ref 2.3–3.5)
GLUCOSE SERPL-MCNC: 82 MG/DL (ref 70–99)
HCT VFR BLD AUTO: 44.3 % (ref 41.1–50.3)
HDLC SERPL-MCNC: 47 MG/DL (ref 40–60)
HDLC SERPL: 3.1 (ref 0–5)
HGB BLD-MCNC: 14.5 G/DL (ref 13.6–17.2)
IMM GRANULOCYTES # BLD AUTO: 0 K/UL (ref 0–0.5)
IMM GRANULOCYTES NFR BLD AUTO: 0 % (ref 0–5)
LDLC SERPL CALC-MCNC: 80 MG/DL (ref 0–100)
LYMPHOCYTES # BLD: 1 K/UL (ref 0.5–4.6)
LYMPHOCYTES NFR BLD: 18 % (ref 13–44)
MCH RBC QN AUTO: 32 PG (ref 26.1–32.9)
MCHC RBC AUTO-ENTMCNC: 32.7 G/DL (ref 31.4–35)
MCV RBC AUTO: 97.8 FL (ref 82–102)
MONOCYTES # BLD: 0.6 K/UL (ref 0.1–1.3)
MONOCYTES NFR BLD: 11 % (ref 4–12)
NEUTS SEG # BLD: 3.6 K/UL (ref 1.7–8.2)
NEUTS SEG NFR BLD: 68 % (ref 43–78)
NRBC # BLD: 0 K/UL (ref 0–0.2)
PLATELET # BLD AUTO: 219 K/UL (ref 150–450)
PMV BLD AUTO: 10.1 FL (ref 9.4–12.3)
POTASSIUM SERPL-SCNC: 4.4 MMOL/L (ref 3.5–5.1)
PROT SERPL-MCNC: 7.1 G/DL (ref 6.3–8.2)
PSA SERPL-MCNC: 0.3 NG/ML (ref 0–4)
RBC # BLD AUTO: 4.53 M/UL (ref 4.23–5.6)
SODIUM SERPL-SCNC: 141 MMOL/L (ref 136–145)
TRIGL SERPL-MCNC: 96 MG/DL (ref 0–150)
TSH, 3RD GENERATION: 2.16 UIU/ML (ref 0.27–4.2)
VLDLC SERPL CALC-MCNC: 19 MG/DL (ref 6–23)
WBC # BLD AUTO: 5.3 K/UL (ref 4.3–11.1)

## 2024-07-10 PROCEDURE — 1123F ACP DISCUSS/DSCN MKR DOCD: CPT | Performed by: NURSE PRACTITIONER

## 2024-07-10 PROCEDURE — 3078F DIAST BP <80 MM HG: CPT | Performed by: NURSE PRACTITIONER

## 2024-07-10 PROCEDURE — 99203 OFFICE O/P NEW LOW 30 MIN: CPT | Performed by: NURSE PRACTITIONER

## 2024-07-10 PROCEDURE — 3074F SYST BP LT 130 MM HG: CPT | Performed by: NURSE PRACTITIONER

## 2024-07-10 RX ORDER — LEVOTHYROXINE SODIUM 0.12 MG/1
125 TABLET ORAL DAILY
Qty: 90 TABLET | Refills: 1 | Status: CANCELLED | OUTPATIENT
Start: 2024-07-10

## 2024-07-10 RX ORDER — TAMSULOSIN HYDROCHLORIDE 0.4 MG/1
0.4 CAPSULE ORAL DAILY
Qty: 90 CAPSULE | Refills: 1 | Status: SHIPPED | OUTPATIENT
Start: 2024-07-10

## 2024-07-10 RX ORDER — TAMSULOSIN HYDROCHLORIDE 0.4 MG/1
0.4 CAPSULE ORAL
Qty: 30 CAPSULE | Status: CANCELLED | OUTPATIENT
Start: 2024-07-10

## 2024-07-10 SDOH — ECONOMIC STABILITY: FOOD INSECURITY: WITHIN THE PAST 12 MONTHS, THE FOOD YOU BOUGHT JUST DIDN'T LAST AND YOU DIDN'T HAVE MONEY TO GET MORE.: NEVER TRUE

## 2024-07-10 SDOH — ECONOMIC STABILITY: FOOD INSECURITY: WITHIN THE PAST 12 MONTHS, YOU WORRIED THAT YOUR FOOD WOULD RUN OUT BEFORE YOU GOT MONEY TO BUY MORE.: NEVER TRUE

## 2024-07-10 SDOH — ECONOMIC STABILITY: HOUSING INSECURITY
IN THE LAST 12 MONTHS, WAS THERE A TIME WHEN YOU DID NOT HAVE A STEADY PLACE TO SLEEP OR SLEPT IN A SHELTER (INCLUDING NOW)?: NO

## 2024-07-10 SDOH — ECONOMIC STABILITY: INCOME INSECURITY: HOW HARD IS IT FOR YOU TO PAY FOR THE VERY BASICS LIKE FOOD, HOUSING, MEDICAL CARE, AND HEATING?: NOT HARD AT ALL

## 2024-07-10 ASSESSMENT — PATIENT HEALTH QUESTIONNAIRE - PHQ9
SUM OF ALL RESPONSES TO PHQ QUESTIONS 1-9: 0
2. FEELING DOWN, DEPRESSED OR HOPELESS: NOT AT ALL
1. LITTLE INTEREST OR PLEASURE IN DOING THINGS: NOT AT ALL
SUM OF ALL RESPONSES TO PHQ9 QUESTIONS 1 & 2: 0
SUM OF ALL RESPONSES TO PHQ QUESTIONS 1-9: 0

## 2024-07-10 ASSESSMENT — ENCOUNTER SYMPTOMS
EYES NEGATIVE: 1
RESPIRATORY NEGATIVE: 1
ALLERGIC/IMMUNOLOGIC NEGATIVE: 1
GASTROINTESTINAL NEGATIVE: 1

## 2024-07-10 NOTE — PROGRESS NOTES
[address]  [place] [address]   (ph) [phone] (fax) [phone]  ROBERT Gabriel-BO      Chief Complaint   Patient presents with    New Patient     Establishing care, requesting to check thyroid and PSA.        78 yo male comes in to establish care. Requesting to check thyroid and PSA. Reports a hx of prostate cancer; however, reports he has not had any trouble from this. Also, still sees Dr. Hutchins for a hx of melanoma that was removed from his contreras by Dr. Estes. Pt is also under cardiology for Atrial fib and reports all of his doctors are now within the [facility] network.         Allergies   Allergen Reactions    Lisinopril Cough       Past Medical History:   Diagnosis Date    History of cardioversion 2022    converted then a fib returned 2 weeks    Hyperlipidemia     Hypertension     New onset a-fib (HCC) 2022    cardioversion @ [facility]-Afib returned 2 weeks later- Eliquis & Metoprolol- [facility]    Prostate CA (HCC)        Family History   Problem Relation Age of Onset    Cancer Mother     Abdominal aortic aneurysm Father        Social History     Socioeconomic History    Marital status:      Spouse name: Not on file    Number of children: Not on file    Years of education: Not on file    Highest education level: Not on file   Occupational History    Not on file   Tobacco Use    Smoking status: Former     Current packs/day: 0.00     Average packs/day: 1 pack/day for 12.0 years (12.0 ttl pk-yrs)     Types: Cigarettes     Start date: 1947     Quit date: 1959     Years since quittin.5     Passive exposure: Never    Smokeless tobacco: Never   Vaping Use    Vaping Use: Never used   Substance and Sexual Activity    Alcohol use: Not Currently    Drug use: Not Currently    Sexual activity: Not on file   Other Topics Concern    Not on file   Social History Narrative    Not on file

## 2024-07-11 RX ORDER — LEVOTHYROXINE SODIUM 0.12 MG/1
125 TABLET ORAL DAILY
Qty: 90 TABLET | Refills: 1 | Status: SHIPPED | OUTPATIENT
Start: 2024-07-11

## 2024-07-11 NOTE — RESULT ENCOUNTER NOTE
Results to patient please.  Electrolytes within normal limits.  Glucose 82.  Liver enzymes are normal.  Alk phos is slightly elevated; however, it has come down.  PSA is 0.3.  A year ago it was 1.2.  TSH is within normal limits.  Therefore, I will send in the levothyroxine 125 mcg that he is currently on.  No need to change dose.  Total cholesterol is 146, triglycerides 96, HDL is 47, and LDL is 80.  Hemoglobin is 14.5 now.  Keep up the good work.  Thanks

## 2024-10-02 ENCOUNTER — HOSPITAL ENCOUNTER (OUTPATIENT)
Dept: PET IMAGING | Age: 79
Discharge: HOME | End: 2024-10-05
Payer: MEDICARE

## 2024-10-02 DIAGNOSIS — C61 PROSTATE CARCINOMA (HCC): ICD-10-CM

## 2024-10-02 DIAGNOSIS — C77.9 MELANOMA METASTATIC TO LYMPH NODE (HCC): ICD-10-CM

## 2024-10-02 LAB
GLUCOSE BLD STRIP.AUTO-MCNC: 87 MG/DL (ref 65–100)
SERVICE CMNT-IMP: NORMAL

## 2024-10-02 PROCEDURE — 3430000000 HC RX DIAGNOSTIC RADIOPHARMACEUTICAL: Performed by: SURGERY

## 2024-10-02 PROCEDURE — 82962 GLUCOSE BLOOD TEST: CPT

## 2024-10-02 PROCEDURE — A9609 HC RX DIAGNOSTIC RADIOPHARMACEUTICAL: HCPCS | Performed by: SURGERY

## 2024-10-02 PROCEDURE — 78816 PET IMAGE W/CT FULL BODY: CPT

## 2024-10-02 PROCEDURE — 2580000003 HC RX 258: Performed by: SURGERY

## 2024-10-02 PROCEDURE — 6360000004 HC RX CONTRAST MEDICATION: Performed by: SURGERY

## 2024-10-02 RX ORDER — SODIUM CHLORIDE 0.9 % (FLUSH) 0.9 %
10 SYRINGE (ML) INJECTION ONCE AS NEEDED
Status: COMPLETED | OUTPATIENT
Start: 2024-10-02 | End: 2024-10-02

## 2024-10-02 RX ORDER — FLUDEOXYGLUCOSE F 18 200 MCI/ML
11.52 INJECTION, SOLUTION INTRAVENOUS
Status: COMPLETED | OUTPATIENT
Start: 2024-10-02 | End: 2024-10-02

## 2024-10-02 RX ORDER — DIATRIZOATE MEGLUMINE AND DIATRIZOATE SODIUM 660; 100 MG/ML; MG/ML
10 SOLUTION ORAL; RECTAL
Status: DISCONTINUED | OUTPATIENT
Start: 2024-10-02 | End: 2024-10-06 | Stop reason: HOSPADM

## 2024-10-02 RX ADMIN — FLUDEOXYGLUCOSE F 18 11.52 MILLICURIE: 200 INJECTION, SOLUTION INTRAVENOUS at 07:39

## 2024-10-02 RX ADMIN — SODIUM CHLORIDE, PRESERVATIVE FREE 10 ML: 5 INJECTION INTRAVENOUS at 07:39

## 2024-10-02 RX ADMIN — DIATRIZOATE MEGLUMINE AND DIATRIZOATE SODIUM 10 ML: 660; 100 LIQUID ORAL; RECTAL at 07:39

## 2024-10-04 ENCOUNTER — NURSE ONLY (OUTPATIENT)
Dept: FAMILY MEDICINE CLINIC | Facility: CLINIC | Age: 79
End: 2024-10-04
Payer: MEDICARE

## 2024-10-04 ENCOUNTER — OFFICE VISIT (OUTPATIENT)
Dept: FAMILY MEDICINE CLINIC | Facility: CLINIC | Age: 79
End: 2024-10-04
Payer: MEDICARE

## 2024-10-04 VITALS
SYSTOLIC BLOOD PRESSURE: 126 MMHG | HEIGHT: 71 IN | HEART RATE: 78 BPM | DIASTOLIC BLOOD PRESSURE: 78 MMHG | OXYGEN SATURATION: 97 % | WEIGHT: 182 LBS | TEMPERATURE: 97.6 F | BODY MASS INDEX: 25.48 KG/M2

## 2024-10-04 DIAGNOSIS — R11.0 NAUSEA: ICD-10-CM

## 2024-10-04 DIAGNOSIS — Z85.46 HISTORY OF PROSTATE CANCER: Primary | ICD-10-CM

## 2024-10-04 DIAGNOSIS — R19.7 DIARRHEA OF PRESUMED INFECTIOUS ORIGIN: Primary | ICD-10-CM

## 2024-10-04 LAB
ALBUMIN SERPL-MCNC: 4.1 G/DL (ref 3.2–4.6)
ALBUMIN/GLOB SERPL: 1.4 (ref 1–1.9)
ALP SERPL-CCNC: 152 U/L (ref 40–129)
ALT SERPL-CCNC: 33 U/L (ref 8–55)
ANION GAP SERPL CALC-SCNC: 13 MMOL/L (ref 9–18)
AST SERPL-CCNC: 36 U/L (ref 15–37)
BASOPHILS # BLD: 0 K/UL (ref 0–0.2)
BASOPHILS NFR BLD: 1 % (ref 0–2)
BILIRUB SERPL-MCNC: 0.4 MG/DL (ref 0–1.2)
BUN SERPL-MCNC: 21 MG/DL (ref 8–23)
CALCIUM SERPL-MCNC: 9.2 MG/DL (ref 8.8–10.2)
CHLORIDE SERPL-SCNC: 109 MMOL/L (ref 98–107)
CO2 SERPL-SCNC: 20 MMOL/L (ref 20–28)
CREAT SERPL-MCNC: 0.92 MG/DL (ref 0.8–1.3)
DIFFERENTIAL METHOD BLD: ABNORMAL
EOSINOPHIL # BLD: 0.1 K/UL (ref 0–0.8)
EOSINOPHIL NFR BLD: 1 % (ref 0.5–7.8)
ERYTHROCYTE [DISTWIDTH] IN BLOOD BY AUTOMATED COUNT: 13.9 % (ref 11.9–14.6)
FECAL OCCULT BLOOD #2, POC: NEGATIVE
FECAL OCCULT BLOOD #3, POC: NEGATIVE
GLOBULIN SER CALC-MCNC: 3 G/DL (ref 2.3–3.5)
GLUCOSE SERPL-MCNC: 90 MG/DL (ref 70–99)
HCT VFR BLD AUTO: 45.8 % (ref 41.1–50.3)
HEMOCCULT STL QL: NEGATIVE
HGB BLD-MCNC: 15 G/DL (ref 13.6–17.2)
IMM GRANULOCYTES # BLD AUTO: 0 K/UL (ref 0–0.5)
IMM GRANULOCYTES NFR BLD AUTO: 0 % (ref 0–5)
LYMPHOCYTES # BLD: 0.8 K/UL (ref 0.5–4.6)
LYMPHOCYTES NFR BLD: 20 % (ref 13–44)
MCH RBC QN AUTO: 32.8 PG (ref 26.1–32.9)
MCHC RBC AUTO-ENTMCNC: 32.8 G/DL (ref 31.4–35)
MCV RBC AUTO: 100.2 FL (ref 82–102)
MONOCYTES # BLD: 0.5 K/UL (ref 0.1–1.3)
MONOCYTES NFR BLD: 11 % (ref 4–12)
NEUTS SEG # BLD: 2.8 K/UL (ref 1.7–8.2)
NEUTS SEG NFR BLD: 67 % (ref 43–78)
NRBC # BLD: 0 K/UL (ref 0–0.2)
PLATELET # BLD AUTO: 216 K/UL (ref 150–450)
PMV BLD AUTO: 10.1 FL (ref 9.4–12.3)
POTASSIUM SERPL-SCNC: 4.8 MMOL/L (ref 3.5–5.1)
PROT SERPL-MCNC: 7.1 G/DL (ref 6.3–8.2)
RBC # BLD AUTO: 4.57 M/UL (ref 4.23–5.6)
SODIUM SERPL-SCNC: 142 MMOL/L (ref 136–145)
VALID INTERNAL CONTROL: YES
WBC # BLD AUTO: 4.2 K/UL (ref 4.3–11.1)

## 2024-10-04 PROCEDURE — 1123F ACP DISCUSS/DSCN MKR DOCD: CPT | Performed by: NURSE PRACTITIONER

## 2024-10-04 PROCEDURE — 99213 OFFICE O/P EST LOW 20 MIN: CPT | Performed by: NURSE PRACTITIONER

## 2024-10-04 PROCEDURE — 3074F SYST BP LT 130 MM HG: CPT | Performed by: NURSE PRACTITIONER

## 2024-10-04 PROCEDURE — 82270 OCCULT BLOOD FECES: CPT | Performed by: NURSE PRACTITIONER

## 2024-10-04 PROCEDURE — 3078F DIAST BP <80 MM HG: CPT | Performed by: NURSE PRACTITIONER

## 2024-10-04 RX ORDER — ONDANSETRON 4 MG/1
4 TABLET, FILM COATED ORAL 3 TIMES DAILY PRN
Qty: 30 TABLET | Refills: 0 | Status: SHIPPED | OUTPATIENT
Start: 2024-10-04

## 2024-10-04 RX ORDER — AZITHROMYCIN 500 MG/1
500 TABLET, FILM COATED ORAL DAILY
Qty: 3 TABLET | Refills: 0 | Status: SHIPPED | OUTPATIENT
Start: 2024-10-04 | End: 2024-10-07

## 2024-10-04 ASSESSMENT — ENCOUNTER SYMPTOMS
NAUSEA: 1
BLOOD IN STOOL: 0
DIARRHEA: 1
FLATUS: 0
BLOATING: 1
ABDOMINAL PAIN: 0
VOMITING: 0
COUGH: 0

## 2024-10-04 NOTE — PROGRESS NOTES
James Giraldo is a 79 y.o. male who presents today for the following:  Chief Complaint   Patient presents with    Nausea     Pt reports nausea, diarrhea for the past 4 days. Difficulty eating       Allergies   Allergen Reactions    Lisinopril Cough       Current Outpatient Medications   Medication Sig Dispense Refill    azithromycin (ZITHROMAX) 500 MG tablet Take 1 tablet by mouth daily for 3 days 3 tablet 0    ondansetron (ZOFRAN) 4 MG tablet Take 1 tablet by mouth 3 times daily as needed for Nausea or Vomiting 30 tablet 0    levothyroxine (SYNTHROID) 125 MCG tablet Take 1 tablet by mouth daily 90 tablet 1    tamsulosin (FLOMAX) 0.4 MG capsule Take 1 capsule by mouth daily 90 capsule 1    apixaban (ELIQUIS) 5 MG TABS tablet Take 1 tablet by mouth 2 times daily 180 tablet 3    atorvastatin (LIPITOR) 20 MG tablet Take 1 tablet by mouth every morning 90 tablet 3    metoprolol tartrate (LOPRESSOR) 25 MG tablet Take 1 tablet by mouth 2 times daily 180 tablet 3    cetirizine (ZYRTEC) 10 MG tablet Take 1 tablet by mouth as needed      Multiple Vitamin (MULTIVITAMINS PO) Take 1 tablet by mouth daily       No current facility-administered medications for this visit.     Facility-Administered Medications Ordered in Other Visits   Medication Dose Route Frequency Provider Last Rate Last Admin    diatrizoate meglumine-sodium (GASTROGRAFIN) 66-10 % solution 10 mL  10 mL Oral ONCE PRN Octaviano Estes MD   10 mL at 10/02/24 0739       Past Medical History:   Diagnosis Date    History of cardioversion 09/01/2022    converted then a fib returned 2 weeks    Hyperlipidemia     Hypertension     New onset a-fib (HCC) 08/17/2022    cardioversion @ [facility] 9/1/22-Afib returned 2 weeks later- Eliquis & Metoprolol- [facility]    Prostate CA (HCC)        Past Surgical History:   Procedure Laterality Date    CARDIOVERSION  09/01/2022    & [facility]    CATARACT EXTRACTION      IR PORT PLACEMENT EQUAL OR GREATER THAN 5 YEARS

## 2024-10-07 DIAGNOSIS — R19.7 DIARRHEA OF PRESUMED INFECTIOUS ORIGIN: ICD-10-CM

## 2024-10-07 RX ORDER — AZITHROMYCIN 500 MG/1
500 TABLET, FILM COATED ORAL DAILY
Qty: 3 TABLET | Refills: 0 | OUTPATIENT
Start: 2024-10-07 | End: 2024-10-10

## 2024-10-08 ENCOUNTER — HOSPITAL ENCOUNTER (OUTPATIENT)
Dept: ULTRASOUND IMAGING | Age: 79
Discharge: HOME | End: 2024-10-11
Attending: SURGERY
Payer: MEDICARE

## 2024-10-08 DIAGNOSIS — C61 PROSTATE CARCINOMA (HCC): ICD-10-CM

## 2024-10-08 DIAGNOSIS — C77.9 MELANOMA METASTATIC TO LYMPH NODE (HCC): ICD-10-CM

## 2024-10-08 PROCEDURE — 76882 US LMTD JT/FCL EVL NVASC XTR: CPT

## 2024-10-16 ENCOUNTER — OFFICE VISIT (OUTPATIENT)
Dept: SURGERY | Age: 79
End: 2024-10-16
Payer: MEDICARE

## 2024-10-16 VITALS — BODY MASS INDEX: 26.05 KG/M2 | WEIGHT: 182 LBS | HEIGHT: 70 IN

## 2024-10-16 DIAGNOSIS — C61 PROSTATE CARCINOMA (HCC): ICD-10-CM

## 2024-10-16 DIAGNOSIS — C43.71 MELANOMA OF RIGHT POSTERIOR CALF (HCC): ICD-10-CM

## 2024-10-16 DIAGNOSIS — C77.9 MELANOMA METASTATIC TO LYMPH NODE (HCC): Primary | ICD-10-CM

## 2024-10-16 PROCEDURE — 1123F ACP DISCUSS/DSCN MKR DOCD: CPT | Performed by: SURGERY

## 2024-10-16 PROCEDURE — 99214 OFFICE O/P EST MOD 30 MIN: CPT | Performed by: SURGERY

## 2024-10-16 NOTE — PROGRESS NOTES
radiologic studies that are shown below as well as any that are in the HPI, and any that are in the expanded problem list below  *Each unique test, order, or document contributes to the combination of 2 or combination of 3 in Category 1 below.  For this visit I also reviewed old records and prior notes.      No results for input(s): \"WBC\", \"HGB\", \"PLT\", \"NA\", \"K\", \"CL\", \"CO2\", \"BUN\", \"GLU\", \"INR\", \"APTT\", \"ALT\", \"LCAD\", \"PH\", \"PCO2\", \"PO2\", \"HCO3\" in the last 72 hours.    Invalid input(s): \"CREA\", \"PTP\", \"TBIL\", \"TBILI\", \"CBIL\", \"SGOT\", \"GPT\", \"AP\", \"AML\", \"LPSE\", \"NH4\", \"TROPT\", \"TROIQ\"  Review of most recent CBC  Lab Results   Component Value Date    WBC 4.2 (L) 10/04/2024    HGB 15.0 10/04/2024    HCT 45.8 10/04/2024    .2 10/04/2024     10/04/2024       Review of most recent BMP  Lab Results   Component Value Date/Time     10/04/2024 12:03 PM    K 4.8 10/04/2024 12:03 PM     10/04/2024 12:03 PM    CO2 20 10/04/2024 12:03 PM    BUN 21 10/04/2024 12:03 PM    CREATININE 0.92 10/04/2024 12:03 PM    GLUCOSE 90 10/04/2024 12:03 PM    CALCIUM 9.2 10/04/2024 12:03 PM        Review of most recent LFTs (and lipase if done)  Lab Results   Component Value Date    ALT 33 10/04/2024    AST 36 10/04/2024    ALKPHOS 152 (H) 10/04/2024    BILITOT 0.4 10/04/2024     No results found for: \"LIPASE\"    No results found for: \"INR\", \"APTT\", \"LCAD\"    Review of most recent HgbA1c  No results found for: \"LABA1C\"  No results found for: \"EAG\"    Nutritional assessment screen for wound healing issues:  No results found for: \"LABPROT\", \"LABALBU\"      @lastcovr@    Xray Result (most recent):  XR LUMBAR SPINE (2-3 VIEWS) 05/21/2024    Narrative  AUTOMATIC ADMINISTRATIVE RESULT    The result for this exam can be found in the Progress note in the chart.    Impression  See Progress note in the chart.      CT Result (most recent):  No results found for this or any previous visit from the past 3650 days.      US Result

## 2024-10-27 NOTE — PROGRESS NOTES
[facility] CARDIOLOGY  [address] [place] [address], [address]  [place] [address]  PHONE: [phone]        NAME:  James Giraldo  : 1945  MRN: [id number]     PCP:  Anna Cardoza, JOVITA - [facility]    SUBJECTIVE:   James Giraldo is a 79 y.o. male seen for a follow-up visit regarding the following:     Chief Complaint   Patient presents with    Irregular Heart Beat    Hypertension        HPI:    He presented recently to establish new cardiac care with a history of hypertension, dyslipidemia, and paroxysmal atrial fibrillation.  He had [facility] directed cardioversion in 2022.  He is on Eliquis and Lopressor with good compliance.  He recently had a malignant melanoma diagnosed in his right lower extremity/calf status post excision with skin grafting.  XRT to prostate CA as well.  Had lumbar spine surgery 2 mos ago without adverse cardiac event.     Doing well since last visit without interval angina, CHF, palpitations, edema, presyncope or syncope.  Vitals controlled and tolerating meds well. Staying active without any significant limitations with exception of low back pain with sciatica.  Recently started low dose gabapentin but hasn't had any improvement in symptoms.     [facility] echo 8/15/2023:    The left ventricular systolic function is normal (55-65%).     Unable to assess left ventricular diastolic function  (atrial   fibrillation).    The right ventricular systolic function is normal.     Normal left ventricle cavity size.     The left atrium is mildly dilated.     The right atrium is mildly dilated.     Aortic valve calcification without evidence of stenosis.     Mild mitral valve regurgitation.     The ascending aorta is mildly dilated.     He was in rate controlled atrial fibrillation last visit and was in A-fib in 2023 as well.  He likely has longstanding persistent atrial fibrillation by definition but remains clinically asymptomatic and will continue with rate control and

## 2024-10-29 ENCOUNTER — OFFICE VISIT (OUTPATIENT)
Age: 79
End: 2024-10-29
Payer: MEDICARE

## 2024-10-29 VITALS
WEIGHT: 182.5 LBS | HEART RATE: 80 BPM | HEIGHT: 71 IN | SYSTOLIC BLOOD PRESSURE: 108 MMHG | BODY MASS INDEX: 25.55 KG/M2 | DIASTOLIC BLOOD PRESSURE: 64 MMHG

## 2024-10-29 DIAGNOSIS — I48.0 PAROXYSMAL ATRIAL FIBRILLATION (HCC): Primary | ICD-10-CM

## 2024-10-29 DIAGNOSIS — I10 PRIMARY HYPERTENSION: ICD-10-CM

## 2024-10-29 DIAGNOSIS — E78.5 DYSLIPIDEMIA: ICD-10-CM

## 2024-10-29 PROCEDURE — 1126F AMNT PAIN NOTED NONE PRSNT: CPT | Performed by: INTERNAL MEDICINE

## 2024-10-29 PROCEDURE — 1160F RVW MEDS BY RX/DR IN RCRD: CPT | Performed by: INTERNAL MEDICINE

## 2024-10-29 PROCEDURE — 99214 OFFICE O/P EST MOD 30 MIN: CPT | Performed by: INTERNAL MEDICINE

## 2024-10-29 PROCEDURE — 3074F SYST BP LT 130 MM HG: CPT | Performed by: INTERNAL MEDICINE

## 2024-10-29 PROCEDURE — 3078F DIAST BP <80 MM HG: CPT | Performed by: INTERNAL MEDICINE

## 2024-10-29 PROCEDURE — 1123F ACP DISCUSS/DSCN MKR DOCD: CPT | Performed by: INTERNAL MEDICINE

## 2024-10-29 PROCEDURE — 1159F MED LIST DOCD IN RCRD: CPT | Performed by: INTERNAL MEDICINE

## 2024-11-12 ENCOUNTER — OFFICE VISIT (OUTPATIENT)
Dept: FAMILY MEDICINE CLINIC | Facility: CLINIC | Age: 79
End: 2024-11-12

## 2024-11-12 VITALS
HEIGHT: 71 IN | SYSTOLIC BLOOD PRESSURE: 110 MMHG | HEART RATE: 98 BPM | DIASTOLIC BLOOD PRESSURE: 58 MMHG | BODY MASS INDEX: 25.68 KG/M2 | WEIGHT: 183.4 LBS | OXYGEN SATURATION: 97 %

## 2024-11-12 DIAGNOSIS — Z71.89 ACP (ADVANCE CARE PLANNING): ICD-10-CM

## 2024-11-12 DIAGNOSIS — E03.9 HYPOTHYROIDISM, UNSPECIFIED TYPE: ICD-10-CM

## 2024-11-12 DIAGNOSIS — E78.2 MIXED HYPERLIPIDEMIA: ICD-10-CM

## 2024-11-12 DIAGNOSIS — I10 PRIMARY HYPERTENSION: Primary | ICD-10-CM

## 2024-11-12 DIAGNOSIS — Z00.00 ENCOUNTER FOR SUBSEQUENT ANNUAL WELLNESS VISIT (AWV) IN MEDICARE PATIENT: ICD-10-CM

## 2024-11-12 DIAGNOSIS — Z85.46 HISTORY OF PROSTATE CANCER: ICD-10-CM

## 2024-11-12 LAB
CHOLEST SERPL-MCNC: 159 MG/DL (ref 0–200)
HDLC SERPL-MCNC: 42 MG/DL (ref 40–60)
HDLC SERPL: 3.8 (ref 0–5)
LDLC SERPL CALC-MCNC: 87 MG/DL (ref 0–100)
TRIGL SERPL-MCNC: 148 MG/DL (ref 0–150)
VLDLC SERPL CALC-MCNC: 30 MG/DL (ref 6–23)

## 2024-11-12 RX ORDER — LEVOTHYROXINE SODIUM 125 UG/1
125 TABLET ORAL DAILY
Qty: 90 TABLET | Refills: 1 | Status: SHIPPED | OUTPATIENT
Start: 2024-11-12

## 2024-11-12 RX ORDER — TAMSULOSIN HYDROCHLORIDE 0.4 MG/1
0.4 CAPSULE ORAL DAILY
Qty: 90 CAPSULE | Refills: 1 | Status: SHIPPED | OUTPATIENT
Start: 2024-11-12

## 2024-11-12 ASSESSMENT — ENCOUNTER SYMPTOMS
RESPIRATORY NEGATIVE: 1
EYES NEGATIVE: 1
GASTROINTESTINAL NEGATIVE: 1
ALLERGIC/IMMUNOLOGIC NEGATIVE: 1

## 2024-11-12 ASSESSMENT — PATIENT HEALTH QUESTIONNAIRE - PHQ9
SUM OF ALL RESPONSES TO PHQ QUESTIONS 1-9: 0
SUM OF ALL RESPONSES TO PHQ9 QUESTIONS 1 & 2: 0
SUM OF ALL RESPONSES TO PHQ QUESTIONS 1-9: 0
2. FEELING DOWN, DEPRESSED OR HOPELESS: NOT AT ALL
SUM OF ALL RESPONSES TO PHQ QUESTIONS 1-9: 0
1. LITTLE INTEREST OR PLEASURE IN DOING THINGS: NOT AT ALL
SUM OF ALL RESPONSES TO PHQ QUESTIONS 1-9: 0

## 2024-11-12 ASSESSMENT — LIFESTYLE VARIABLES
HOW OFTEN DO YOU HAVE A DRINK CONTAINING ALCOHOL: NEVER
HOW MANY STANDARD DRINKS CONTAINING ALCOHOL DO YOU HAVE ON A TYPICAL DAY: PATIENT DOES NOT DRINK

## 2024-11-12 NOTE — PROGRESS NOTES
[facility] [address]  [place] [address]   (ph) [phone] (fax) [phone]  NEWTON Gabriel      Chief Complaint   Patient presents with    Medicare AWV    Medication Refill       80 yo male comes in as a recheck and for his AWV. Reports a hx of prostate cancer; however, reports he has not had any trouble from this. Also, still sees Dr. Hutchins for a hx of melanoma that was removed from his contreras by Dr. Estes. Pt is also under cardiology for Atrial fib and reports all of his doctors are now within the [facility] [employer] network.     Medication Refill        Allergies   Allergen Reactions    Lisinopril Cough       Past Medical History:   Diagnosis Date    History of cardioversion 2022    converted then a fib returned 2 weeks    Hyperlipidemia     Hypertension     New onset a-fib (HCC) 2022    cardioversion @ [facility]-Afib returned 2 weeks later- Eliquis & Metoprolol- [facility]    Prostate CA (HCC)        Family History   Problem Relation Age of Onset    Cancer Mother     Abdominal aortic aneurysm Father        Social History     Socioeconomic History    Marital status:      Spouse name: Not on file    Number of children: Not on file    Years of education: Not on file    Highest education level: Not on file   Occupational History    Not on file   Tobacco Use    Smoking status: Former     Current packs/day: 0.00     Average packs/day: 1 pack/day for 12.0 years (12.0 ttl pk-yrs)     Types: Cigarettes     Start date: 1947     Quit date: 1959     Years since quittin.9     Passive exposure: Never    Smokeless tobacco: Never   Vaping Use    Vaping status: Never Used   Substance and Sexual Activity    Alcohol use: Not Currently    Drug use: Not Currently    Sexual activity: Not Currently   Other Topics Concern    Not on file   Social History Narrative    Not on file     Social Determinants of Health     Financial

## 2024-11-12 NOTE — PATIENT INSTRUCTIONS
message is shown on a monitor.  Use text messaging, social media, and email if it is hard for you to communicate by telephone.  Try to learn a listening technique called speechreading. It is not lipreading. You pay attention to people's gestures, expressions, posture, and tone of voice. These clues can help you understand what a person is saying. Face the person you are talking to, and have them face you. Make sure the lighting is good. You need to see the other person's face clearly.  Think about counseling if you need help to adjust to your hearing loss.  When should you call for help?  Watch closely for changes in your health, and be sure to contact your doctor if:    You think your hearing is getting worse.     You have new symptoms, such as dizziness or nausea.   Where can you learn more?  Go to https://www.[employer].net/patientEd and enter R798 to learn more about \"Hearing Loss: Care Instructions.\"  Current as of: September 27, 2023  Content Version: 14.2  © 2024 [employer].   Care instructions adapted under license by [employer]. If you have questions about a medical condition or this instruction, always ask your healthcare professional. [employer], [employer] disclaims any warranty or liability for your use of this information.           Learning About Vision Tests  What are vision tests?     The four most common vision tests are visual acuity tests, refraction, visual field tests, and color vision tests.  Visual acuity (sharpness) tests  These tests are used:  To see if you need glasses or contact lenses.  To monitor an eye problem.  To check an eye injury.  Visual acuity tests are done as part of routine exams. You may also have this test when you get your 's license or apply for some types of jobs.  Visual field tests  These tests are used:  To check for vision loss in any area of your range of vision.  To screen for certain eye diseases.  To look for nerve damage after a stroke,

## 2024-11-13 NOTE — RESULT ENCOUNTER NOTE
Results to patient please.  Total cholesterol is 159, triglycerides 148, HDL is 42 and LDL is 87.   Continue low-cholesterol diet and meds.  Thanks

## 2025-02-14 RX ORDER — APIXABAN 5 MG/1
5 TABLET, FILM COATED ORAL 2 TIMES DAILY
Qty: 180 TABLET | Refills: 3 | OUTPATIENT
Start: 2025-02-14

## 2025-02-14 RX ORDER — APIXABAN 5 MG/1
5 TABLET, FILM COATED ORAL 2 TIMES DAILY
Qty: 180 TABLET | Refills: 3 | Status: SHIPPED | OUTPATIENT
Start: 2025-02-14

## 2025-02-14 NOTE — TELEPHONE ENCOUNTER
Requested Prescriptions     Pending Prescriptions Disp Refills    ELIQUIS 5 MG TABS tablet [Pharmacy Med Name: ELIQUIS 5MG TABLETS] 180 tablet 3     Sig: TAKE 1 TABLET BY MOUTH TWICE DAILY     Verified rx. Last OV 10/29/24. Erx to pharm on file.

## 2025-02-17 RX ORDER — ATORVASTATIN CALCIUM 20 MG/1
20 TABLET, FILM COATED ORAL EVERY MORNING
Qty: 90 TABLET | Refills: 3 | Status: SHIPPED | OUTPATIENT
Start: 2025-02-17

## 2025-02-17 RX ORDER — METOPROLOL TARTRATE 25 MG/1
25 TABLET, FILM COATED ORAL 2 TIMES DAILY
Qty: 180 TABLET | Refills: 3 | Status: SHIPPED | OUTPATIENT
Start: 2025-02-17

## 2025-02-17 NOTE — TELEPHONE ENCOUNTER
Requested Prescriptions     Pending Prescriptions Disp Refills    atorvastatin (LIPITOR) 20 MG tablet 90 tablet 3     Sig: Take 1 tablet by mouth every morning    metoprolol tartrate (LOPRESSOR) 25 MG tablet 180 tablet 3     Sig: Take 1 tablet by mouth 2 times daily     Verified rx. Last OV 10/29/24. Erx to pharm on file.

## 2025-02-17 NOTE — TELEPHONE ENCOUNTER
MEDICATION REFILL REQUEST      Name of Medication:  Atorvastatin  Dose:  20mg  Frequency:  once daily  Quantity:  30  Days' supply:  90      Pharmacy Name/Location:  [facility]    MEDICATION REFILL REQUEST      Name of Medication: Eliquis   Dose:  5 mg   Frequency:  twice daily  Quantity:  60  Days' supply:  90      Pharmacy Name/Location:  [facility]     MEDICATION REFILL REQUEST      Name of Medication:  Metoprolol   Dose:  25mg   Frequency:  twice daily  Quantity:  60  Days' supply:  90      Pharmacy Name/Location:  [facility]

## 2025-05-11 NOTE — PROGRESS NOTES
[facility] CARDIOLOGY  [address] [place] [address], [address]  [place] [address]  PHONE: [phone]        NAME:  James Giraldo  : 1945  MRN: [id number]     PCP:  Anna Cardoza, JOVITA - [facility]    SUBJECTIVE:   James Giraldo is a 79 y.o. male seen for a follow-up visit regarding the following:     Chief Complaint   Patient presents with    Irregular Heart Beat        HPI:    He presented recently to establish new cardiac care with a history of hypertension, dyslipidemia, and paroxysmal atrial fibrillation.  He had [facility] directed cardioversion in 2022.  He is on Eliquis and Lopressor with good compliance.  He recently had a malignant melanoma diagnosed in his right lower extremity/calf status post excision with skin grafting.  XRT to prostate CA as well.  Had lumbar spine surgery 2 mos ago without adverse cardiac event.     Doing well since last visit without interval angina, CHF, palpitations, edema, presyncope or syncope.  Vitals controlled and tolerating meds well. Staying active without any significant limitations with exception of low back pain with sciatica.  Recently started low dose gabapentin but hasn't had any improvement in symptoms.     [facility] echo 8/15/2023:    The left ventricular systolic function is normal (55-65%).     Unable to assess left ventricular diastolic function  (atrial   fibrillation).    The right ventricular systolic function is normal.     Normal left ventricle cavity size.     The left atrium is mildly dilated.     The right atrium is mildly dilated.     Aortic valve calcification without evidence of stenosis.     Mild mitral valve regurgitation.     The ascending aorta is mildly dilated.     He was in rate controlled atrial fibrillation last visit and was in A-fib in 2023 as well.  He likely has longstanding persistent atrial fibrillation by definition but remains clinically asymptomatic and will continue with rate control and anticoagulation

## 2025-05-12 DIAGNOSIS — C43.71 MELANOMA OF RIGHT POSTERIOR CALF (HCC): ICD-10-CM

## 2025-05-12 DIAGNOSIS — C61 PROSTATE CARCINOMA (HCC): ICD-10-CM

## 2025-05-12 DIAGNOSIS — C77.9 MELANOMA METASTATIC TO LYMPH NODE (HCC): Primary | ICD-10-CM

## 2025-05-13 ENCOUNTER — OFFICE VISIT (OUTPATIENT)
Age: 80
End: 2025-05-13
Payer: MEDICARE

## 2025-05-13 VITALS
BODY MASS INDEX: 25.48 KG/M2 | DIASTOLIC BLOOD PRESSURE: 72 MMHG | HEIGHT: 71 IN | SYSTOLIC BLOOD PRESSURE: 136 MMHG | HEART RATE: 58 BPM | WEIGHT: 182 LBS

## 2025-05-13 DIAGNOSIS — I48.11 LONGSTANDING PERSISTENT ATRIAL FIBRILLATION (HCC): ICD-10-CM

## 2025-05-13 DIAGNOSIS — I10 PRIMARY HYPERTENSION: ICD-10-CM

## 2025-05-13 DIAGNOSIS — R06.09 DOE (DYSPNEA ON EXERTION): ICD-10-CM

## 2025-05-13 DIAGNOSIS — E78.5 DYSLIPIDEMIA: ICD-10-CM

## 2025-05-13 DIAGNOSIS — I48.0 PAROXYSMAL ATRIAL FIBRILLATION (HCC): Primary | ICD-10-CM

## 2025-05-13 PROCEDURE — 1126F AMNT PAIN NOTED NONE PRSNT: CPT | Performed by: INTERNAL MEDICINE

## 2025-05-13 PROCEDURE — 1160F RVW MEDS BY RX/DR IN RCRD: CPT | Performed by: INTERNAL MEDICINE

## 2025-05-13 PROCEDURE — 3078F DIAST BP <80 MM HG: CPT | Performed by: INTERNAL MEDICINE

## 2025-05-13 PROCEDURE — 93000 ELECTROCARDIOGRAM COMPLETE: CPT | Performed by: INTERNAL MEDICINE

## 2025-05-13 PROCEDURE — G8427 DOCREV CUR MEDS BY ELIG CLIN: HCPCS | Performed by: INTERNAL MEDICINE

## 2025-05-13 PROCEDURE — 1123F ACP DISCUSS/DSCN MKR DOCD: CPT | Performed by: INTERNAL MEDICINE

## 2025-05-13 PROCEDURE — G8419 CALC BMI OUT NRM PARAM NOF/U: HCPCS | Performed by: INTERNAL MEDICINE

## 2025-05-13 PROCEDURE — 3075F SYST BP GE 130 - 139MM HG: CPT | Performed by: INTERNAL MEDICINE

## 2025-05-13 PROCEDURE — 1036F TOBACCO NON-USER: CPT | Performed by: INTERNAL MEDICINE

## 2025-05-13 PROCEDURE — 99214 OFFICE O/P EST MOD 30 MIN: CPT | Performed by: INTERNAL MEDICINE

## 2025-05-13 PROCEDURE — 1159F MED LIST DOCD IN RCRD: CPT | Performed by: INTERNAL MEDICINE

## 2025-05-14 ENCOUNTER — RESULTS FOLLOW-UP (OUTPATIENT)
Dept: FAMILY MEDICINE CLINIC | Facility: CLINIC | Age: 80
End: 2025-05-14

## 2025-05-14 ENCOUNTER — OFFICE VISIT (OUTPATIENT)
Dept: FAMILY MEDICINE CLINIC | Facility: CLINIC | Age: 80
End: 2025-05-14
Payer: MEDICARE

## 2025-05-14 VITALS
WEIGHT: 179.8 LBS | BODY MASS INDEX: 25.17 KG/M2 | OXYGEN SATURATION: 97 % | SYSTOLIC BLOOD PRESSURE: 112 MMHG | HEIGHT: 71 IN | DIASTOLIC BLOOD PRESSURE: 70 MMHG | HEART RATE: 79 BPM

## 2025-05-14 DIAGNOSIS — E78.2 MIXED HYPERLIPIDEMIA: ICD-10-CM

## 2025-05-14 DIAGNOSIS — R74.8 ELEVATED LIVER ENZYMES: Primary | ICD-10-CM

## 2025-05-14 DIAGNOSIS — Z85.46 HISTORY OF PROSTATE CANCER: ICD-10-CM

## 2025-05-14 DIAGNOSIS — I10 PRIMARY HYPERTENSION: ICD-10-CM

## 2025-05-14 DIAGNOSIS — E03.9 HYPOTHYROIDISM, UNSPECIFIED TYPE: Primary | ICD-10-CM

## 2025-05-14 DIAGNOSIS — E03.9 HYPOTHYROIDISM, UNSPECIFIED TYPE: ICD-10-CM

## 2025-05-14 LAB
ALBUMIN SERPL-MCNC: 3.8 G/DL (ref 3.2–4.6)
ALBUMIN/GLOB SERPL: 1.3 (ref 1–1.9)
ALP SERPL-CCNC: 220 U/L (ref 40–129)
ALT SERPL-CCNC: 53 U/L (ref 8–55)
ANION GAP SERPL CALC-SCNC: 10 MMOL/L (ref 7–16)
AST SERPL-CCNC: 42 U/L (ref 15–37)
BASOPHILS # BLD: 0.04 K/UL (ref 0–0.2)
BASOPHILS NFR BLD: 0.8 % (ref 0–2)
BILIRUB SERPL-MCNC: 0.6 MG/DL (ref 0–1.2)
BUN SERPL-MCNC: 13 MG/DL (ref 8–23)
CALCIUM SERPL-MCNC: 9.9 MG/DL (ref 8.8–10.2)
CHLORIDE SERPL-SCNC: 102 MMOL/L (ref 98–107)
CHOLEST SERPL-MCNC: 149 MG/DL (ref 0–200)
CK SERPL-CCNC: 103 U/L (ref 21–215)
CO2 SERPL-SCNC: 28 MMOL/L (ref 20–29)
CREAT SERPL-MCNC: 0.79 MG/DL (ref 0.8–1.3)
DIFFERENTIAL METHOD BLD: ABNORMAL
EOSINOPHIL # BLD: 0.25 K/UL (ref 0–0.8)
EOSINOPHIL NFR BLD: 4.9 % (ref 0.5–7.8)
ERYTHROCYTE [DISTWIDTH] IN BLOOD BY AUTOMATED COUNT: 12.5 % (ref 11.9–14.6)
GLOBULIN SER CALC-MCNC: 2.9 G/DL (ref 2.3–3.5)
GLUCOSE SERPL-MCNC: 94 MG/DL (ref 70–99)
HCT VFR BLD AUTO: 45 % (ref 41.1–50.3)
HDLC SERPL-MCNC: 44 MG/DL (ref 40–60)
HDLC SERPL: 3.4 (ref 0–5)
HGB BLD-MCNC: 15.2 G/DL (ref 13.6–17.2)
IMM GRANULOCYTES # BLD AUTO: 0.01 K/UL (ref 0–0.5)
IMM GRANULOCYTES NFR BLD AUTO: 0.2 % (ref 0–5)
LDLC SERPL CALC-MCNC: 83 MG/DL (ref 0–100)
LYMPHOCYTES # BLD: 0.86 K/UL (ref 0.5–4.6)
LYMPHOCYTES NFR BLD: 16.8 % (ref 13–44)
MCH RBC QN AUTO: 34.4 PG (ref 26.1–32.9)
MCHC RBC AUTO-ENTMCNC: 33.8 G/DL (ref 31.4–35)
MCV RBC AUTO: 101.8 FL (ref 82–102)
MONOCYTES # BLD: 0.58 K/UL (ref 0.1–1.3)
MONOCYTES NFR BLD: 11.4 % (ref 4–12)
NEUTS SEG # BLD: 3.37 K/UL (ref 1.7–8.2)
NEUTS SEG NFR BLD: 65.9 % (ref 43–78)
NRBC # BLD: 0 K/UL (ref 0–0.2)
PLATELET # BLD AUTO: 206 K/UL (ref 150–450)
PMV BLD AUTO: 10.3 FL (ref 9.4–12.3)
POTASSIUM SERPL-SCNC: 4.3 MMOL/L (ref 3.5–5.1)
PROT SERPL-MCNC: 6.7 G/DL (ref 6.3–8.2)
PSA SERPL-MCNC: 0.1 NG/ML (ref 0–4)
RBC # BLD AUTO: 4.42 M/UL (ref 4.23–5.6)
SODIUM SERPL-SCNC: 140 MMOL/L (ref 136–145)
TRIGL SERPL-MCNC: 110 MG/DL (ref 0–150)
TSH, 3RD GENERATION: 2.23 UIU/ML (ref 0.27–4.2)
VLDLC SERPL CALC-MCNC: 22 MG/DL (ref 6–23)
WBC # BLD AUTO: 5.1 K/UL (ref 4.3–11.1)

## 2025-05-14 PROCEDURE — 3078F DIAST BP <80 MM HG: CPT | Performed by: NURSE PRACTITIONER

## 2025-05-14 PROCEDURE — 1036F TOBACCO NON-USER: CPT | Performed by: NURSE PRACTITIONER

## 2025-05-14 PROCEDURE — G8419 CALC BMI OUT NRM PARAM NOF/U: HCPCS | Performed by: NURSE PRACTITIONER

## 2025-05-14 PROCEDURE — G8427 DOCREV CUR MEDS BY ELIG CLIN: HCPCS | Performed by: NURSE PRACTITIONER

## 2025-05-14 PROCEDURE — 1123F ACP DISCUSS/DSCN MKR DOCD: CPT | Performed by: NURSE PRACTITIONER

## 2025-05-14 PROCEDURE — 3074F SYST BP LT 130 MM HG: CPT | Performed by: NURSE PRACTITIONER

## 2025-05-14 PROCEDURE — 99214 OFFICE O/P EST MOD 30 MIN: CPT | Performed by: NURSE PRACTITIONER

## 2025-05-14 RX ORDER — LEVOTHYROXINE SODIUM 125 UG/1
125 TABLET ORAL DAILY
Qty: 90 TABLET | Refills: 1 | Status: SHIPPED | OUTPATIENT
Start: 2025-05-14

## 2025-05-14 SDOH — ECONOMIC STABILITY: FOOD INSECURITY: WITHIN THE PAST 12 MONTHS, THE FOOD YOU BOUGHT JUST DIDN'T LAST AND YOU DIDN'T HAVE MONEY TO GET MORE.: PATIENT DECLINED

## 2025-05-14 SDOH — ECONOMIC STABILITY: FOOD INSECURITY: WITHIN THE PAST 12 MONTHS, YOU WORRIED THAT YOUR FOOD WOULD RUN OUT BEFORE YOU GOT MONEY TO BUY MORE.: PATIENT DECLINED

## 2025-05-14 ASSESSMENT — PATIENT HEALTH QUESTIONNAIRE - PHQ9
SUM OF ALL RESPONSES TO PHQ QUESTIONS 1-9: 0
2. FEELING DOWN, DEPRESSED OR HOPELESS: NOT AT ALL
1. LITTLE INTEREST OR PLEASURE IN DOING THINGS: NOT AT ALL
SUM OF ALL RESPONSES TO PHQ QUESTIONS 1-9: 0

## 2025-05-14 ASSESSMENT — ENCOUNTER SYMPTOMS
GASTROINTESTINAL NEGATIVE: 1
EYES NEGATIVE: 1
RESPIRATORY NEGATIVE: 1
ALLERGIC/IMMUNOLOGIC NEGATIVE: 1

## 2025-05-14 NOTE — RESULT ENCOUNTER NOTE
Results to patient please.  Electrolytes are within normal limits.  Glucose is 94.  AST is slightly elevated.  Can we please add a hepatitis panel to recent labs?  Alk phos was also slightly elevated.  Hemoglobin is 15.2.  PSA is 0.1.  This falls within normal range.  TSH is within normal limits.  Total cholesterol is 149, triglycerides 110, HDL is 44 and LDL is 83.    Thanks

## 2025-05-14 NOTE — PROGRESS NOTES
[facility] Care [address]  [place] [address]   (ph) [phone] (fax) [phone]  NEWTON Gabriel      Chief Complaint   Patient presents with    6 Month Follow-Up     Discuss insurance letter and check PSA labs       80 yo male comes in as a recheck of chronic conditions. Reports a hx of prostate cancer; however, reports he has not had any trouble from this. Also, still sees Dr. Hutchins for a hx of melanoma that was removed from his contreras by Dr. Estes. Pt is also under cardiology for Atrial fib. He wishes to discuss [employer]  insurance letter and check PSA labs.         Allergies   Allergen Reactions    Lisinopril Cough       Past Medical History:   Diagnosis Date    History of cardioversion 2022    converted then a fib returned 2 weeks    Hyperlipidemia     Hypertension     New onset a-fib (HCC) 2022    cardioversion @ [facility]-Afib returned 2 weeks later- Eliquis & Metoprolol- [facility]    Prostate CA (HCC)        Family History   Problem Relation Age of Onset    Cancer Mother     Abdominal aortic aneurysm Father        Social History     Socioeconomic History    Marital status:      Spouse name: Not on file    Number of children: Not on file    Years of education: Not on file    Highest education level: Not on file   Occupational History    Not on file   Tobacco Use    Smoking status: Former     Current packs/day: 0.00     Average packs/day: 1 pack/day for 12.0 years (12.0 ttl pk-yrs)     Types: Cigarettes     Start date: 1947     Quit date: 1959     Years since quittin.4     Passive exposure: Never    Smokeless tobacco: Never   Vaping Use    Vaping status: Never Used   Substance and Sexual Activity    Alcohol use: Not Currently    Drug use: Not Currently    Sexual activity: Not Currently   Other Topics Concern    Not on file   Social History Narrative    Not on file     Social Drivers of

## 2025-05-22 ENCOUNTER — OFFICE VISIT (OUTPATIENT)
Dept: SURGERY | Age: 80
End: 2025-05-22
Payer: MEDICARE

## 2025-05-22 VITALS — BODY MASS INDEX: 25.06 KG/M2 | HEIGHT: 71 IN | WEIGHT: 179 LBS

## 2025-05-22 DIAGNOSIS — C43.71 MELANOMA OF RIGHT POSTERIOR CALF (HCC): ICD-10-CM

## 2025-05-22 DIAGNOSIS — C77.9 MELANOMA METASTATIC TO LYMPH NODE (HCC): Primary | ICD-10-CM

## 2025-05-22 PROCEDURE — 1160F RVW MEDS BY RX/DR IN RCRD: CPT | Performed by: SURGERY

## 2025-05-22 PROCEDURE — 99214 OFFICE O/P EST MOD 30 MIN: CPT | Performed by: SURGERY

## 2025-05-22 PROCEDURE — G8420 CALC BMI NORM PARAMETERS: HCPCS | Performed by: SURGERY

## 2025-05-22 PROCEDURE — 1036F TOBACCO NON-USER: CPT | Performed by: SURGERY

## 2025-05-22 PROCEDURE — 1159F MED LIST DOCD IN RCRD: CPT | Performed by: SURGERY

## 2025-05-22 PROCEDURE — G8427 DOCREV CUR MEDS BY ELIG CLIN: HCPCS | Performed by: SURGERY

## 2025-05-22 PROCEDURE — 1123F ACP DISCUSS/DSCN MKR DOCD: CPT | Performed by: SURGERY

## 2025-05-22 NOTE — PROGRESS NOTES
?Independent interpretation of a test performed by another physician/other qualified health care professional (not separately reported);     or  Category 3: Discussion of management or test interpretation  ?Discussion of management or test interpretation with external physician/other qualified health care professional/appropriate source (not separately reported)   High risk of morbidity from additional diagnostic testing or treatment  Examples only:  ?Drug therapy requiring intensive monitoring for toxicity  ?Decision regarding elective major surgery with identified patient or procedure risk factors?Decision regarding emergency major surgery  ?Decision regarding hospitalization  ?Decision not to resuscitate or to de-escalate care because of poor prognosis             I have personally performed a face-to-face diagnostic evaluation and management  service on this patient.      I have independently seen the patient.   I have independently obtained the above history from the patient/family.    I have independently examined the patient with above findings.  I have independently reviewed data/labs for this patient and developed the above plan of care (MDM).      Signed: ABDIRIZAK AGUILAR MD  5/22/2025    7:44 AM

## 2025-09-02 ENCOUNTER — OFFICE VISIT (OUTPATIENT)
Dept: FAMILY MEDICINE CLINIC | Facility: CLINIC | Age: 80
End: 2025-09-02
Payer: MEDICARE

## 2025-09-02 VITALS
BODY MASS INDEX: 25.62 KG/M2 | SYSTOLIC BLOOD PRESSURE: 112 MMHG | HEART RATE: 85 BPM | OXYGEN SATURATION: 96 % | DIASTOLIC BLOOD PRESSURE: 70 MMHG | WEIGHT: 183 LBS | HEIGHT: 71 IN

## 2025-09-02 DIAGNOSIS — M54.50 ACUTE RIGHT-SIDED LOW BACK PAIN WITHOUT SCIATICA: ICD-10-CM

## 2025-09-02 DIAGNOSIS — K40.90 RIGHT INGUINAL HERNIA: Primary | ICD-10-CM

## 2025-09-02 LAB
BILIRUBIN, URINE, POC: NEGATIVE
BLOOD URINE, POC: NEGATIVE
GLUCOSE URINE, POC: NEGATIVE
KETONES, URINE, POC: NEGATIVE
LEUKOCYTE ESTERASE, URINE, POC: NEGATIVE
NITRITE, URINE, POC: NEGATIVE
PH, URINE, POC: 7 (ref 4.6–8)
PROTEIN,URINE, POC: NEGATIVE
SPECIFIC GRAVITY, URINE, POC: 1.03 (ref 1–1.03)
URINALYSIS CLARITY, POC: CLEAR
URINALYSIS COLOR, POC: YELLOW
UROBILINOGEN, POC: NORMAL

## 2025-09-02 PROCEDURE — G8419 CALC BMI OUT NRM PARAM NOF/U: HCPCS | Performed by: NURSE PRACTITIONER

## 2025-09-02 PROCEDURE — 1123F ACP DISCUSS/DSCN MKR DOCD: CPT | Performed by: NURSE PRACTITIONER

## 2025-09-02 PROCEDURE — 3078F DIAST BP <80 MM HG: CPT | Performed by: NURSE PRACTITIONER

## 2025-09-02 PROCEDURE — 81003 URINALYSIS AUTO W/O SCOPE: CPT | Performed by: NURSE PRACTITIONER

## 2025-09-02 PROCEDURE — 99213 OFFICE O/P EST LOW 20 MIN: CPT | Performed by: NURSE PRACTITIONER

## 2025-09-02 PROCEDURE — 3074F SYST BP LT 130 MM HG: CPT | Performed by: NURSE PRACTITIONER

## 2025-09-02 PROCEDURE — G8427 DOCREV CUR MEDS BY ELIG CLIN: HCPCS | Performed by: NURSE PRACTITIONER

## 2025-09-02 PROCEDURE — 1036F TOBACCO NON-USER: CPT | Performed by: NURSE PRACTITIONER

## 2025-09-02 RX ORDER — TIZANIDINE 2 MG/1
2 TABLET ORAL NIGHTLY PRN
Qty: 10 TABLET | Refills: 0 | Status: SHIPPED | OUTPATIENT
Start: 2025-09-02

## 2025-09-02 ASSESSMENT — PATIENT HEALTH QUESTIONNAIRE - PHQ9
2. FEELING DOWN, DEPRESSED OR HOPELESS: NOT AT ALL
SUM OF ALL RESPONSES TO PHQ QUESTIONS 1-9: 0
1. LITTLE INTEREST OR PLEASURE IN DOING THINGS: NOT AT ALL
SUM OF ALL RESPONSES TO PHQ QUESTIONS 1-9: 0

## 2025-09-02 ASSESSMENT — ENCOUNTER SYMPTOMS
ALLERGIC/IMMUNOLOGIC NEGATIVE: 1
EYES NEGATIVE: 1
RESPIRATORY NEGATIVE: 1
ABDOMINAL PAIN: 1

## 2025-09-03 ASSESSMENT — ENCOUNTER SYMPTOMS: BACK PAIN: 1

## (undated) DEVICE — SUTURE VCRL SZ 3-0 L18IN ABSRB UD L26MM SH 1/2 CIR J864D

## (undated) DEVICE — DRESSING,GAUZE,XEROFORM,CURAD,1"X8",ST: Brand: CURAD

## (undated) DEVICE — NEEDLE SPNL L3.5IN PNK HUB S STL REG WALL FIT STYL W/ QNCKE

## (undated) DEVICE — POSTERIOR LAMI VANPLT-LUCAS: Brand: MEDLINE INDUSTRIES, INC.

## (undated) DEVICE — SHEET,DRAPE,53X77,STERILE: Brand: MEDLINE

## (undated) DEVICE — ABSORBENT, WATERPROOF, BACTERIA PROOF FILM DRESSING: Brand: OPSITE POST OP 9.5X8.5CM CTN 20

## (undated) DEVICE — SUTURE VCRL + SZ 3-0 L18IN ABSRB UD SH 1/2 CIR TAPERCUT NDL VCP864D

## (undated) DEVICE — PAD,NON-ADHERENT,3X8,STERILE,LF,1/PK: Brand: MEDLINE

## (undated) DEVICE — SUTURE STRATAFIX SPRL MCRYL + SZ 4-0 L12IN ABSRB UD PS-2 SXMP1B117

## (undated) DEVICE — APPLIER LIG CLP M L11IN TI STR RNG HNDL FOR 20 CLP DISP

## (undated) DEVICE — 1.5 TO 1 DERMACARRIER: Brand: MESHGRAFTTM  II TISSUE EXPANSION SYSTEM

## (undated) DEVICE — MINOR SPLIT GENERAL: Brand: MEDLINE INDUSTRIES, INC.

## (undated) DEVICE — SUTURE ETHLN SZ 2-0 L18IN NONABSORBABLE BLK L26MM PS 3/8 585H

## (undated) DEVICE — 3M™ TEGADERM™ TRANSPARENT FILM DRESSING FRAME STYLE, 1628, 6 IN X 8 IN (15 CM X 20 CM), 10/CT 8CT/CASE: Brand: 3M™ TEGADERM™

## (undated) DEVICE — DRAPE MICSCP DISPOSABLE

## (undated) DEVICE — SOLUTION IRRIG 1000ML 09% SOD CHL USP PIC PLAS CONTAINER

## (undated) DEVICE — GLOVE SURG SZ 85 L12IN FNGR THK79MIL GRN LTX FREE

## (undated) DEVICE — GLOVE SURG SZ 65 THK91MIL LTX FREE SYN POLYISOPRENE

## (undated) DEVICE — STANDARD HYPODERMIC NEEDLE,POLYPROPYLENE HUB: Brand: MONOJECT

## (undated) DEVICE — BANDAGE,GAUZE,BULKEE II,4.5"X4.1YD,STRL: Brand: MEDLINE

## (undated) DEVICE — SYRINGE EAR 2OZ ULC SLIMMER TIP FLAT BTM SUCT PWR DISP FOR

## (undated) DEVICE — COVER US PRB W12XL244CM FLD IORT STR TIP

## (undated) DEVICE — STERILE POLYISOPRENE POWDER-FREE SURGICAL GLOVES WITH EMOLLIENT COATING: Brand: PROTEXIS

## (undated) DEVICE — SUTURE VCRL SZ 2-0 L18IN ABSRB VLT L26MM SH 1/2 CIR J775D

## (undated) DEVICE — CARBIDE MATCH HEAD

## (undated) DEVICE — Device

## (undated) DEVICE — APPLIER CLP L9.38IN M LIG TI DISP STR RNG HNDL LIGACLP

## (undated) DEVICE — T-DRAPE,EXTREMITY,STERILE: Brand: MEDLINE

## (undated) DEVICE — STRIP,CLOSURE,WOUND,MEDI-STRIP,1/2X4: Brand: MEDLINE

## (undated) DEVICE — AGENT HEMOSTATIC SURGIFLOW MATRIX KIT W/THROMBIN

## (undated) DEVICE — 3M™ TEGADERM™ TRANSPARENT FILM DRESSING FRAME STYLE, 1626W, 4 IN X 4-3/4 IN (10 CM X 12 CM), 50/CT 4CT/CASE: Brand: 3M™ TEGADERM™

## (undated) DEVICE — DRESSING FOAM W6XL8IN THN CNFRM ATRAUM W/ SAFETAC

## (undated) DEVICE — SOLUTION IRRIG 1000ML 0.9% SOD CHL USP POUR PLAS BTL

## (undated) DEVICE — GLOVE SURG SZ 7 L12IN FNGR THK79MIL GRN LTX FREE

## (undated) DEVICE — SUTURE PDS II SZ 0 L18IN ABSRB VLT L36MM CT-1 1/2 CIR Z740D

## (undated) DEVICE — PREMIUM WET SKIN PREP TRAY: Brand: MEDLINE INDUSTRIES, INC.

## (undated) DEVICE — ELECTRODE NDL 2.8IN COAT VALLEYLAB

## (undated) DEVICE — FORCEPS BPLR L210MM TIP L1.5 WRK L105MM STR BAYNT INSUL DISP

## (undated) DEVICE — SUTURE PDS II SZ 0 L27IN ABSRB VLT L36MM CT-1 1/2 CIR Z340H

## (undated) DEVICE — ADHESIVE SKIN CLOSURE WND 8.661X1.5 IN 22 CM LIQUIBAND SECUR